# Patient Record
Sex: FEMALE | Race: WHITE | Employment: FULL TIME | URBAN - METROPOLITAN AREA
[De-identification: names, ages, dates, MRNs, and addresses within clinical notes are randomized per-mention and may not be internally consistent; named-entity substitution may affect disease eponyms.]

---

## 2017-10-19 ENCOUNTER — OFFICE VISIT (OUTPATIENT)
Dept: FAMILY MEDICINE CLINIC | Age: 27
End: 2017-10-19
Payer: COMMERCIAL

## 2017-10-19 VITALS
WEIGHT: 105.8 LBS | BODY MASS INDEX: 19.98 KG/M2 | HEIGHT: 61 IN | OXYGEN SATURATION: 98 % | TEMPERATURE: 97.6 F | HEART RATE: 96 BPM | SYSTOLIC BLOOD PRESSURE: 120 MMHG | DIASTOLIC BLOOD PRESSURE: 70 MMHG

## 2017-10-19 DIAGNOSIS — M79.7 FIBROMYALGIA: ICD-10-CM

## 2017-10-19 DIAGNOSIS — Z76.89 ENCOUNTER TO ESTABLISH CARE: ICD-10-CM

## 2017-10-19 DIAGNOSIS — R00.0 TACHYCARDIA: ICD-10-CM

## 2017-10-19 DIAGNOSIS — L70.0 ACNE VULGARIS: ICD-10-CM

## 2017-10-19 DIAGNOSIS — K58.0 IRRITABLE BOWEL SYNDROME WITH DIARRHEA: ICD-10-CM

## 2017-10-19 DIAGNOSIS — R42 EPISODIC LIGHTHEADEDNESS: ICD-10-CM

## 2017-10-19 DIAGNOSIS — G43.009 MIGRAINE WITHOUT AURA AND WITHOUT STATUS MIGRAINOSUS, NOT INTRACTABLE: Primary | ICD-10-CM

## 2017-10-19 DIAGNOSIS — Z00.00 HEALTHCARE MAINTENANCE: ICD-10-CM

## 2017-10-19 LAB
ALBUMIN SERPL-MCNC: 4.6 G/DL
ALP BLD-CCNC: 53 U/L
ALT SERPL-CCNC: 11 U/L
ANION GAP SERPL CALCULATED.3IONS-SCNC: 10 MMOL/L
AST SERPL-CCNC: 14 U/L
BILIRUB SERPL-MCNC: 0.5 MG/DL (ref 0.1–1.4)
BUN BLDV-MCNC: 13 MG/DL
CALCIUM SERPL-MCNC: 9.3 MG/DL
CHLORIDE BLD-SCNC: 102 MMOL/L
CO2: 26 MMOL/L
CREAT SERPL-MCNC: 0.6 MG/DL
GFR CALCULATED: >60
GLUCOSE BLD-MCNC: 83 MG/DL
POTASSIUM SERPL-SCNC: 4.1 MMOL/L
SODIUM BLD-SCNC: 138 MMOL/L
TOTAL PROTEIN: 7.4
TSH SERPL DL<=0.05 MIU/L-ACNC: 0.56 UIU/ML

## 2017-10-19 PROCEDURE — 99203 OFFICE O/P NEW LOW 30 MIN: CPT | Performed by: NURSE PRACTITIONER

## 2017-10-19 PROCEDURE — 93000 ELECTROCARDIOGRAM COMPLETE: CPT | Performed by: NURSE PRACTITIONER

## 2017-10-19 RX ORDER — SPIRONOLACTONE 50 MG/1
50 TABLET, FILM COATED ORAL DAILY
COMMUNITY
End: 2020-04-29 | Stop reason: SDUPTHER

## 2017-10-19 RX ORDER — AMITRIPTYLINE HYDROCHLORIDE 25 MG/1
25 TABLET, FILM COATED ORAL NIGHTLY
Qty: 30 TABLET | Refills: 2 | Status: SHIPPED | OUTPATIENT
Start: 2017-10-19 | End: 2018-04-05 | Stop reason: ALTCHOICE

## 2017-10-19 RX ORDER — GABAPENTIN 300 MG/1
300 CAPSULE ORAL 3 TIMES DAILY
COMMUNITY
End: 2019-01-30 | Stop reason: SDUPTHER

## 2017-10-19 ASSESSMENT — ENCOUNTER SYMPTOMS
WHEEZING: 0
EYE DISCHARGE: 0
RHINORRHEA: 0
ALLERGIC/IMMUNOLOGIC NEGATIVE: 1
STRIDOR: 0
SINUS PRESSURE: 0
SHORTNESS OF BREATH: 0
EYES NEGATIVE: 1
COUGH: 0
ABDOMINAL PAIN: 1
RESPIRATORY NEGATIVE: 1
ABDOMINAL DISTENTION: 0
EYE REDNESS: 0
NAUSEA: 1
DIARRHEA: 1

## 2017-10-19 NOTE — PROGRESS NOTES
Patient is present establishing care. Patient has not had a pcp in a long time. Patient see rheumatology for fibromyalgia. Patient is complaining of some tachycardia. Patient refused flu vaccine. Patients pain level is a 2/10. Medications and pharmacy reviewed with patient. Visit Information    Have you changed or started any medications since your last visit including any over-the-counter medicines, vitamins, or herbal medicines? no   Have you stopped taking any of your medications? Is so, why? -  no  Are you having any side effects from any of your medications? - no    Have you seen any other physician or provider since your last visit?  no   Have you had any other diagnostic tests since your last visit? yes - 6 months ago rheum. [facility]   Have you been seen in the emergency room and/or had an admission in a hospital since we last saw you?  no   Have you had your routine dental cleaning in the past 6 months?  yes -      Do you have an active MyChart account? If no, what is the barrier?   Yes    Patient Care Team:  Amanda Cam NP as PCP - General (Certified Nurse Practitioner)  Stephanie Roberts MD as Consulting Physician (Rheumatology)  Mihaela Shelley MD (Physical Medicine and Rehab)    Medical History Review  Past Medical, Family, and Social History reviewed and does contribute to the patient presenting condition    Health Maintenance   Topic Date Due    HIV screen  06/27/2004    DTaP/Tdap/Td vaccine (1 - Tdap) 06/27/2008    Cervical cancer screen  06/27/2010    Flu vaccine (1) 09/01/2017

## 2017-10-19 NOTE — PROGRESS NOTES
[address] [facility] [address]  Dept: [phone]  Dept Fax: [phone]      Kathy Lockhart is a 28 y.o. female who presents today for her medical conditions/complaints as noted below. Kathy Lockhart is c/o of [facility]      HPI:     HPI     Mayra Womelsdorf is here to est. She is a  at [employer]    She does see rheum, Dr. Cathy Rodriguez at [facility]. She is taking Neurontin for this problem. This is helping with her syx. She has seen physical medicine for a left forearm issue. They may be doing an EMG. She had seen a GI in [place] for IBS diarrhea. She did purchase probiotics and has not taken them routinely. She is using tumeric. She did try 2 courses of xifaxan. She did see a slight improvement but nothing that was lasting. She rates her stool a type 6 on the [facility] scale. She does have pain with the diarrhea. Migraines are an issue. She is having headaches most mornings when she wakes up. Coffee seems to make this better. She has a different more migraine like headache that usually comes later in the day. She has sensitivity to sound and noise. She does have visual changes. She feels these come at least 4 times weekly. She does have nausea, she has vomited on occasion. She has used excedrin, ibu. She will take an ibu as soon as she feels the HA coming. She will need to leave work early for these. She will put herself into a dark room. She is tachycardic often. She does become dizzy and short of breath with the tachycardia. She does have an aunt with Zamudio syndrome. She has not sought care prior to this. She does not have an EKG. She has not had any syncopal episodes. She does feel that she will lose balance and \"walk into walls\" at times.      No results found for: LABA1C          ( goal A1C is < 7)   No results found for: LABMICR  No results found for: LDLCHOLESTEROL, LDLCALC    (goal LDL is <100)   No results found for: AST, chest pain. Gastrointestinal: Positive for abdominal pain, diarrhea and nausea. Negative for abdominal distention. Endocrine: Negative. Negative for cold intolerance and heat intolerance. Genitourinary: Negative. Negative for difficulty urinating, frequency and urgency. Musculoskeletal: Positive for myalgias. Negative for arthralgias. Skin: Negative. Negative for rash. Allergic/Immunologic: Negative. Neurological: Positive for dizziness and headaches. Negative for weakness and numbness. Hematological: Negative. Psychiatric/Behavioral: Negative. Negative for sleep disturbance. The patient is not nervous/anxious. Objective:     /70 (Site: Left Arm, Position: Sitting, Cuff Size: Small Adult)   Pulse 96   Temp 97.6 °F (36.4 °C) (Oral)   Ht 5' 1\" (1.549 m)   Wt 105 lb 12.8 oz (48 kg)   SpO2 98%   BMI 19.99 kg/m²   Physical Exam   Constitutional: She is oriented to person, place, and time. Vital signs are normal. She appears well-developed and well-nourished. HENT:   Head: Normocephalic and atraumatic. Eyes: Conjunctivae are normal.   Neck: No thyromegaly present. Cardiovascular: Normal rate, regular rhythm and normal heart sounds. Exam reveals no gallop and no friction rub. No murmur heard. Pulmonary/Chest: Effort normal and breath sounds normal. No respiratory distress. She has no wheezes. She has no rales. Abdominal: Soft. Bowel sounds are normal. She exhibits no distension. There is no tenderness. Musculoskeletal: Normal range of motion. She exhibits no edema. Neurological: She is alert and oriented to person, place, and time. Coordination normal.   Skin: Skin is warm and dry. No rash noted. No erythema. Psychiatric: She has a normal mood and affect. Her behavior is normal. Judgment and thought content normal.   Vitals reviewed. EKG: normal EKG, normal sinus rhythm. Assessment:      1.  Migraine without aura and without status migrainosus, not intractable    2. Tachycardia    3. Acne vulgaris    4. Healthcare maintenance    5. Encounter to establish care    6. Episodic lightheadedness    7. Irritable bowel syndrome with diarrhea    8. Fibromyalgia                     Plan:     1. Migraine without aura and without status migrainosus, not intractable  Avoid triggers; red wine, aged cheese chocolate, lack of sleep  - amitriptyline (ELAVIL) 25 MG tablet; Take 1 tablet by mouth nightly  Dispense: 30 tablet; Refill: 2    2. Tachycardia    - TSH; Future  - T4, Free; Future  - EKG 12 lead unit performed  If no abnormality in labs will do 24 hour halter or refer to cardiology. She would prefer to do some testing before referring. 3. Acne vulgaris    - spironolactone (ALDACTONE) 50 MG tablet; Take 50 mg by mouth daily    4. Healthcare maintenance    - Basic Metabolic Panel; Future  - Comprehensive Metabolic Panel; Future  - HIV Screen; Future    5. Encounter to establish care      6. Episodic lightheadedness  EKG    7. Irritable bowel syndrome with diarrhea    - Eluxadoline (VIBERZI) 100 MG TABS; Take 100 mg by mouth 2 times daily  Dispense: 16 tablet; Refill: 0    8. Fibromyalgia    - gabapentin (NEURONTIN) 300 MG capsule; Take 300 mg by mouth 3 times daily      Orders Placed This Encounter   Procedures    Basic Metabolic Panel     Standing Status:   Future     Standing Expiration Date:   10/19/2018    Comprehensive Metabolic Panel     Standing Status:   Future     Standing Expiration Date:   10/19/2018    HIV Screen     Standing Status:   Future     Standing Expiration Date:   10/19/2018    TSH     Standing Status:   Future     Standing Expiration Date:   10/19/2018    T4, Free     Standing Status:   Future     Standing Expiration Date:   10/19/2018    EKG 12 lead unit performed     Order Specific Question:   Reason for Exam?     Answer: Tachycardia       No Follow-up on file. Patient given educational materials - see patient instructions.

## 2017-10-25 DIAGNOSIS — R00.0 TACHYCARDIA: ICD-10-CM

## 2017-10-25 DIAGNOSIS — Z00.00 HEALTHCARE MAINTENANCE: ICD-10-CM

## 2017-11-14 ENCOUNTER — OFFICE VISIT (OUTPATIENT)
Dept: FAMILY MEDICINE CLINIC | Age: 27
End: 2017-11-14
Payer: COMMERCIAL

## 2017-11-14 VITALS
HEART RATE: 122 BPM | BODY MASS INDEX: 20.78 KG/M2 | OXYGEN SATURATION: 96 % | WEIGHT: 110 LBS | DIASTOLIC BLOOD PRESSURE: 70 MMHG | TEMPERATURE: 97.7 F | SYSTOLIC BLOOD PRESSURE: 120 MMHG

## 2017-11-14 DIAGNOSIS — G43.009 MIGRAINE WITHOUT AURA AND WITHOUT STATUS MIGRAINOSUS, NOT INTRACTABLE: Primary | ICD-10-CM

## 2017-11-14 DIAGNOSIS — R42 EPISODIC LIGHTHEADEDNESS: ICD-10-CM

## 2017-11-14 DIAGNOSIS — R00.0 TACHYCARDIA: ICD-10-CM

## 2017-11-14 PROCEDURE — 99214 OFFICE O/P EST MOD 30 MIN: CPT | Performed by: NURSE PRACTITIONER

## 2017-11-14 RX ORDER — AMITRIPTYLINE HYDROCHLORIDE 10 MG/1
10 TABLET, FILM COATED ORAL NIGHTLY PRN
Qty: 30 TABLET | Refills: 3 | Status: SHIPPED | OUTPATIENT
Start: 2017-11-14 | End: 2018-04-05 | Stop reason: SDUPTHER

## 2017-11-14 ASSESSMENT — ENCOUNTER SYMPTOMS
RHINORRHEA: 0
STRIDOR: 0
SINUS PRESSURE: 0
ABDOMINAL PAIN: 1
EYE REDNESS: 0
SHORTNESS OF BREATH: 0
RESPIRATORY NEGATIVE: 1
WHEEZING: 0
DIARRHEA: 1
ALLERGIC/IMMUNOLOGIC NEGATIVE: 1
EYE DISCHARGE: 0
EYES NEGATIVE: 1
ABDOMINAL DISTENTION: 0
NAUSEA: 1
COUGH: 0

## 2017-11-14 NOTE — PROGRESS NOTES
Patient states viberzi did not really help. Medications and pharmacy reviewed with patient. Patient would like to do holtor moniter. Patient would like a new referral to rheumatology/ or if you can treat fibromyalgia for patient. Patient is not to fond of doctor. Visit Information    Have you changed or started any medications since your last visit including any over-the-counter medicines, vitamins, or herbal medicines? no   Have you stopped taking any of your medications? Is so, why? -  no  Are you having any side effects from any of your medications? - no    Have you seen any other physician or provider since your last visit? yes - physical medication   Have you had any other diagnostic tests since your last visit? yes - xray wrists and hand, emg   Have you been seen in the emergency room and/or had an admission in a hospital since we last saw you?  no   Have you had your routine dental cleaning in the past 6 months?  no     Do you have an active MyChart account? If no, what is the barrier?   Yes    Patient Care Team:  Marimar Padron NP as PCP - General (Certified Nurse Practitioner)  Veronika Vale MD as Consulting Physician (Rheumatology)  Vivi Jules MD (Physical Medicine and Rehab)  Veronika Vale MD as Consulting Physician (Internal Medicine)  Vivi Jules MD as Consulting Physician (Physical Medicine and Rehab)    Medical History Review  Past Medical, Family, and Social History reviewed and does contribute to the patient presenting condition    Health Maintenance   Topic Date Due    DTaP/Tdap/Td vaccine (1 - Tdap) 10/19/2018 (Originally 6/27/2008)    Cervical cancer screen  10/19/2018 (Originally 6/27/2010)    Flu vaccine (1) 10/19/2018 (Originally 9/1/2017)    HIV screen  Completed
Encounter   Procedures    External Referral To Cardiology     Referral Priority:   Routine     Referral Type:   Consult for Advice and Opinion     Referral Reason:   Specialty Services Required     Referred to Provider:   Feliciano Nayak MD     Requested Specialty:   Cardiology     Number of Visits Requested:   1       No Follow-up on file. Patient given educational materials - see patient instructions. Discussed use, benefit, and side effects of prescribed medications. All patient questions answered. Pt voiced understanding. Reviewed health maintenance. Instructed to continue current medications, diet and exercise. Patient agreed with treatment plan. Follow up as directed.      Electronically signed by Contreras Mays NP on 11/14/2017

## 2018-03-28 DIAGNOSIS — G43.009 MIGRAINE WITHOUT AURA AND WITHOUT STATUS MIGRAINOSUS, NOT INTRACTABLE: ICD-10-CM

## 2018-03-29 RX ORDER — AMITRIPTYLINE HYDROCHLORIDE 10 MG/1
10 TABLET, FILM COATED ORAL NIGHTLY PRN
Qty: 30 TABLET | Refills: 3 | OUTPATIENT
Start: 2018-03-29

## 2018-04-05 ENCOUNTER — OFFICE VISIT (OUTPATIENT)
Dept: FAMILY MEDICINE CLINIC | Age: 28
End: 2018-04-05
Payer: COMMERCIAL

## 2018-04-05 VITALS
BODY MASS INDEX: 21.16 KG/M2 | DIASTOLIC BLOOD PRESSURE: 70 MMHG | OXYGEN SATURATION: 100 % | WEIGHT: 112 LBS | TEMPERATURE: 98.3 F | SYSTOLIC BLOOD PRESSURE: 102 MMHG | HEART RATE: 78 BPM

## 2018-04-05 DIAGNOSIS — R00.0 TACHYCARDIA: Primary | ICD-10-CM

## 2018-04-05 DIAGNOSIS — G43.009 MIGRAINE WITHOUT AURA AND WITHOUT STATUS MIGRAINOSUS, NOT INTRACTABLE: ICD-10-CM

## 2018-04-05 PROCEDURE — 99214 OFFICE O/P EST MOD 30 MIN: CPT | Performed by: NURSE PRACTITIONER

## 2018-04-05 RX ORDER — AMITRIPTYLINE HYDROCHLORIDE 10 MG/1
20 TABLET, FILM COATED ORAL NIGHTLY
Qty: 60 TABLET | Refills: 1 | Status: SHIPPED | OUTPATIENT
Start: 2018-04-05 | End: 2018-06-30 | Stop reason: SDUPTHER

## 2018-04-05 RX ORDER — INDOMETHACIN 50 MG/1
50 CAPSULE ORAL 2 TIMES DAILY WITH MEALS
Qty: 60 CAPSULE | Refills: 3 | Status: SHIPPED | OUTPATIENT
Start: 2018-04-05 | End: 2018-07-16

## 2018-04-05 ASSESSMENT — PATIENT HEALTH QUESTIONNAIRE - PHQ9
SUM OF ALL RESPONSES TO PHQ QUESTIONS 1-9: 0
SUM OF ALL RESPONSES TO PHQ9 QUESTIONS 1 & 2: 0
2. FEELING DOWN, DEPRESSED OR HOPELESS: 0
1. LITTLE INTEREST OR PLEASURE IN DOING THINGS: 0

## 2018-04-06 ASSESSMENT — ENCOUNTER SYMPTOMS
ABDOMINAL DISTENTION: 0
ALLERGIC/IMMUNOLOGIC NEGATIVE: 1
EYE REDNESS: 0
COUGH: 0
EYE DISCHARGE: 0
WHEEZING: 0
RESPIRATORY NEGATIVE: 1
RHINORRHEA: 0
STRIDOR: 0
SHORTNESS OF BREATH: 0
EYES NEGATIVE: 1
DIARRHEA: 0
ABDOMINAL PAIN: 0
NAUSEA: 0
SINUS PRESSURE: 0

## 2018-07-16 ENCOUNTER — OFFICE VISIT (OUTPATIENT)
Dept: FAMILY MEDICINE CLINIC | Age: 28
End: 2018-07-16
Payer: COMMERCIAL

## 2018-07-16 VITALS
WEIGHT: 116 LBS | OXYGEN SATURATION: 99 % | TEMPERATURE: 98.2 F | DIASTOLIC BLOOD PRESSURE: 68 MMHG | BODY MASS INDEX: 21.92 KG/M2 | HEART RATE: 80 BPM | SYSTOLIC BLOOD PRESSURE: 120 MMHG

## 2018-07-16 DIAGNOSIS — F34.1 DYSTHYMIA: ICD-10-CM

## 2018-07-16 DIAGNOSIS — Z13.220 SCREENING FOR CHOLESTEROL LEVEL: ICD-10-CM

## 2018-07-16 DIAGNOSIS — G43.009 MIGRAINE WITHOUT AURA AND WITHOUT STATUS MIGRAINOSUS, NOT INTRACTABLE: Primary | ICD-10-CM

## 2018-07-16 DIAGNOSIS — F41.9 ANXIETY: ICD-10-CM

## 2018-07-16 PROCEDURE — 99214 OFFICE O/P EST MOD 30 MIN: CPT | Performed by: NURSE PRACTITIONER

## 2018-07-16 RX ORDER — BUTALBITAL, ACETAMINOPHEN AND CAFFEINE 50; 325; 40 MG/1; MG/1; MG/1
1 TABLET ORAL DAILY PRN
Qty: 60 TABLET | Refills: 1 | Status: SHIPPED | OUTPATIENT
Start: 2018-07-16 | End: 2019-05-01 | Stop reason: SDUPTHER

## 2018-07-16 RX ORDER — AMITRIPTYLINE HYDROCHLORIDE 25 MG/1
25 TABLET, FILM COATED ORAL NIGHTLY
Qty: 90 TABLET | Refills: 1 | Status: SHIPPED | OUTPATIENT
Start: 2018-07-16 | End: 2019-01-30 | Stop reason: SDUPTHER

## 2018-07-16 ASSESSMENT — PATIENT HEALTH QUESTIONNAIRE - PHQ9
1. LITTLE INTEREST OR PLEASURE IN DOING THINGS: 0
2. FEELING DOWN, DEPRESSED OR HOPELESS: 0
SUM OF ALL RESPONSES TO PHQ QUESTIONS 1-9: 0
SUM OF ALL RESPONSES TO PHQ9 QUESTIONS 1 & 2: 0

## 2018-07-16 ASSESSMENT — ENCOUNTER SYMPTOMS
RHINORRHEA: 0
ABDOMINAL PAIN: 0
ABDOMINAL DISTENTION: 0
NAUSEA: 0
ALLERGIC/IMMUNOLOGIC NEGATIVE: 1
RESPIRATORY NEGATIVE: 1
EYE REDNESS: 0
DIARRHEA: 0
COUGH: 0
EYE PAIN: 0
SINUS PRESSURE: 0

## 2018-09-14 LAB
CHOLESTEROL, TOTAL: 207 MG/DL
CHOLESTEROL/HDL RATIO: 2.9
HDLC SERPL-MCNC: 71 MG/DL (ref 35–70)
LDL CHOLESTEROL CALCULATED: 126 MG/DL (ref 0–160)
TRIGL SERPL-MCNC: 49 MG/DL
VLDLC SERPL CALC-MCNC: 10 MG/DL

## 2018-09-20 DIAGNOSIS — Z13.220 SCREENING FOR CHOLESTEROL LEVEL: ICD-10-CM

## 2019-01-30 ENCOUNTER — OFFICE VISIT (OUTPATIENT)
Dept: FAMILY MEDICINE CLINIC | Age: 29
End: 2019-01-30
Payer: COMMERCIAL

## 2019-01-30 VITALS
BODY MASS INDEX: 22.67 KG/M2 | DIASTOLIC BLOOD PRESSURE: 68 MMHG | TEMPERATURE: 98 F | SYSTOLIC BLOOD PRESSURE: 110 MMHG | HEART RATE: 97 BPM | OXYGEN SATURATION: 98 % | WEIGHT: 120 LBS

## 2019-01-30 DIAGNOSIS — G43.009 MIGRAINE WITHOUT AURA AND WITHOUT STATUS MIGRAINOSUS, NOT INTRACTABLE: ICD-10-CM

## 2019-01-30 DIAGNOSIS — M79.7 FIBROMYALGIA: Primary | ICD-10-CM

## 2019-01-30 PROCEDURE — 99214 OFFICE O/P EST MOD 30 MIN: CPT | Performed by: NURSE PRACTITIONER

## 2019-01-30 RX ORDER — GABAPENTIN 300 MG/1
300 CAPSULE ORAL 2 TIMES DAILY
Qty: 60 CAPSULE | Refills: 2 | Status: SHIPPED | OUTPATIENT
Start: 2019-01-30 | End: 2019-05-01 | Stop reason: SDUPTHER

## 2019-01-30 RX ORDER — AMITRIPTYLINE HYDROCHLORIDE 25 MG/1
25 TABLET, FILM COATED ORAL NIGHTLY
Qty: 90 TABLET | Refills: 1 | Status: SHIPPED | OUTPATIENT
Start: 2019-01-30 | End: 2019-08-01 | Stop reason: SDUPTHER

## 2019-01-30 ASSESSMENT — PATIENT HEALTH QUESTIONNAIRE - PHQ9
2. FEELING DOWN, DEPRESSED OR HOPELESS: 0
SUM OF ALL RESPONSES TO PHQ QUESTIONS 1-9: 0
SUM OF ALL RESPONSES TO PHQ QUESTIONS 1-9: 0
SUM OF ALL RESPONSES TO PHQ9 QUESTIONS 1 & 2: 0
1. LITTLE INTEREST OR PLEASURE IN DOING THINGS: 0

## 2019-01-30 ASSESSMENT — ENCOUNTER SYMPTOMS
ALLERGIC/IMMUNOLOGIC NEGATIVE: 1
COUGH: 0
DIARRHEA: 0
RESPIRATORY NEGATIVE: 1
NAUSEA: 0
ABDOMINAL PAIN: 0
ABDOMINAL DISTENTION: 0

## 2019-05-01 ENCOUNTER — OFFICE VISIT (OUTPATIENT)
Dept: FAMILY MEDICINE CLINIC | Age: 29
End: 2019-05-01
Payer: COMMERCIAL

## 2019-05-01 VITALS
HEART RATE: 98 BPM | WEIGHT: 123.4 LBS | OXYGEN SATURATION: 99 % | HEIGHT: 63 IN | BODY MASS INDEX: 21.86 KG/M2 | SYSTOLIC BLOOD PRESSURE: 120 MMHG | DIASTOLIC BLOOD PRESSURE: 60 MMHG

## 2019-05-01 DIAGNOSIS — G43.009 MIGRAINE WITHOUT AURA AND WITHOUT STATUS MIGRAINOSUS, NOT INTRACTABLE: Primary | ICD-10-CM

## 2019-05-01 DIAGNOSIS — M79.7 FIBROMYALGIA: ICD-10-CM

## 2019-05-01 PROCEDURE — 99213 OFFICE O/P EST LOW 20 MIN: CPT | Performed by: NURSE PRACTITIONER

## 2019-05-01 RX ORDER — BUTALBITAL, ACETAMINOPHEN AND CAFFEINE 50; 325; 40 MG/1; MG/1; MG/1
1 TABLET ORAL DAILY PRN
Qty: 60 TABLET | Refills: 1 | Status: SHIPPED | OUTPATIENT
Start: 2019-05-01 | End: 2020-01-30 | Stop reason: SDUPTHER

## 2019-05-01 RX ORDER — GABAPENTIN 300 MG/1
300 CAPSULE ORAL 3 TIMES DAILY
Qty: 90 CAPSULE | Refills: 2 | Status: SHIPPED | OUTPATIENT
Start: 2019-05-01 | End: 2019-08-01 | Stop reason: SDUPTHER

## 2019-05-01 ASSESSMENT — ENCOUNTER SYMPTOMS
NAUSEA: 0
DIARRHEA: 0
ABDOMINAL DISTENTION: 0
RESPIRATORY NEGATIVE: 1
ALLERGIC/IMMUNOLOGIC NEGATIVE: 1
ABDOMINAL PAIN: 0
COUGH: 0

## 2019-05-01 NOTE — PROGRESS NOTES
Pt here today for 3 month follow up on headaches and med refill  Pt states headaches are manageable only a couple a week    Discuss increasing gabapentin    Visit Information    Have you changed or started any medications since your last visit including any over-the-counter medicines, vitamins, or herbal medicines? no   Have you stopped taking any of your medications? Is so, why? -  no  Are you having any side effects from any of your medications? - no    Have you seen any other physician or provider since your last visit?  no   Have you had any other diagnostic tests since your last visit?  no   Have you been seen in the emergency room and/or had an admission in a hospital since we last saw you?  no   Have you had your routine dental cleaning in the past 6 months?  yes - december     Do you have an active MyChart account? If no, what is the barrier?   Yes    Patient Care Team:  RODRIGUE Dorman - CNP as PCP - General (Certified Nurse Practitioner)  Dahlia Becerril MD as Consulting Physician (Rheumatology)  Charisma Dutta MD as Consulting Physician (Physical Medicine and Rehab)  Dahlia Becerril MD as Consulting Physician (Internal Medicine)  Charisma Dutta MD as Consulting Physician (Physical Medicine and Rehab)  Mary Howe MD as Consulting Physician (Cardiology)    Medical History Review  Past Medical, Family, and Social History reviewed and does contribute to the patient presenting condition    Health Maintenance   Topic Date Due    Varicella Vaccine (1 of 2 - 13+ 2-dose series) 06/27/2002    DTaP/Tdap/Td vaccine (1 - Tdap) 01/30/2020 (Originally 6/27/2008)    Cervical cancer screen  01/30/2020 (Originally 6/27/2010)    Flu vaccine (Season Ended) 01/30/2020 (Originally 9/1/2019)    Potassium monitoring  01/30/2020 (Originally 10/19/2018)    Creatinine monitoring  01/30/2020 (Originally 10/19/2018)    HIV screen  Completed    Pneumococcal 0-64 years Vaccine  Aged Out

## 2019-05-01 NOTE — PROGRESS NOTES
[address] [facility] [address]  Dept: [phone]  Dept Fax: [phone]      Lucas Olszewski is a 33 y.o. female who presents today for hermedical conditions/complaints as noted below. Lucas Olszewski is c/o of 3 Month Follow-Up; Headache; and Medication Refill            HPI:      PRASANNA Cui is here today for refills. She is taking amitriptyline for her migraine headaches. This has made a vast difference for her as far as the headaches go. She has gained wt on the medication and is concerned that this may continue. At this time she will continue with the medication    Fibromyalgia-found that cutting back to BID was nto good for her. She was having an increase in shooting pain in the bilateral hands and arms. She states this medication is working well for her syx, she would like to go back to TID.        No results found for: LABA1C          ( goal A1Cis < 7)   No results found for: LABMICR  LDL Calculated (mg/dL)   Date Value   09/14/2018 126       (goal LDL is <100)   AST (U/L)   Date Value   10/19/2017 14     ALT (U/L)   Date Value   10/19/2017 11     BUN (mg/dL)   Date Value   10/19/2017 13     BP Readings from Last 3 Encounters:   05/01/19 120/60   01/30/19 110/68   07/16/18 120/68          (goal 120/80)    Past Medical History:   Diagnosis Date    Allergic rhinitis     Fibromyalgia     Headache       Past Surgical History:   Procedure Laterality Date    TOTAL HIP ARTHROPLASTY  2013       Family History   Problem Relation Age of Onset    Asthma Mother     Heart Disease Father     High Blood Pressure Father           Social History     Tobacco Use    Smoking status: Never Smoker    Smokeless tobacco: Never Used   Substance Use Topics    Alcohol use: Yes     Comment: social         Current Outpatient Medications   Medication Sig Dispense Refill    gabapentin (NEURONTIN) 300 MG capsule Take 1 capsule by mouth 3 times daily for 30 days. 90 capsule 2    butalbital-acetaminophen-caffeine (FIORICET, ESGIC) -40 MG per tablet Take 1 tablet by mouth daily as needed for Headaches or Migraine 60 tablet 1    amitriptyline (ELAVIL) 25 MG tablet Take 1 tablet by mouth nightly 90 tablet 1    metoprolol tartrate (LOPRESSOR) 25 MG tablet Take 12.5 mg by mouth 2 times daily      spironolactone (ALDACTONE) 50 MG tablet Take 50 mg by mouth daily       No current facility-administered medications for this visit. Allergies   Allergen Reactions    Indocin [Indomethacin] Nausea And Vomiting       Health Maintenance   Topic Date Due    Varicella Vaccine (1 of 2 - 13+ 2-dose series) 06/27/2002    DTaP/Tdap/Td vaccine (1 - Tdap) 01/30/2020 (Originally 6/27/2008)    Cervical cancer screen  01/30/2020 (Originally 6/27/2010)    Flu vaccine (Season Ended) 01/30/2020 (Originally 9/1/2019)    Potassium monitoring  01/30/2020 (Originally 10/19/2018)    Creatinine monitoring  01/30/2020 (Originally 10/19/2018)    HIV screen  Completed    Pneumococcal 0-64 years Vaccine  Aged Out          Review of Systems   Constitutional: Positive for appetite change. Negative for activity change. HENT: Negative. Respiratory: Negative. Negative for cough. Cardiovascular: Positive for palpitations (improved). Negative for chest pain. Gastrointestinal: Negative for abdominal distention, abdominal pain, diarrhea and nausea. Musculoskeletal: Positive for myalgias (much improved). Negative for arthralgias. Skin: Negative. Negative for rash. Allergic/Immunologic: Negative. Neurological: Positive for headaches. Negative for dizziness, weakness and numbness. Psychiatric/Behavioral: Negative. Negative for sleep disturbance. The patient is not nervous/anxious.         Objective:     /60 (Site: Left Upper Arm, Position: Sitting, Cuff Size: Medium Adult)   Pulse 98   Ht 5' 3\" (1.6 m)   Wt 123 lb 6.4 oz (56 kg)   SpO2 99%   BMI 21.86 kg/m² Physical Exam   Constitutional: She is oriented to person, place, and time. She appears well-developed and well-nourished. HENT:   Head: Normocephalic. Eyes: Conjunctivae are normal.   Cardiovascular: Normal rate and regular rhythm. Pulmonary/Chest: Effort normal and breath sounds normal.   Neurological: She is alert and oriented to person, place, and time. No cranial nerve deficit. Skin: Skin is warm and dry. Psychiatric: She has a normal mood and affect. Her behavior is normal. Judgment and thought content normal.         Assessment:      1. Migraine without aura and without status migrainosus, not intractable    2. Fibromyalgia                     Plan:      No orders of the defined types were placed in this encounter. 1. Fibromyalgia    - gabapentin (NEURONTIN) 300 MG capsule; Take 1 capsule by mouth 3 times daily for 30 days. Dispense: 90 capsule; Refill: 2    2. Migraine without aura and without status migrainosus, not intractable    - butalbital-acetaminophen-caffeine (FIORICET, ESGIC) -40 MG per tablet; Take 1 tablet by mouth daily as needed for Headaches or Migraine  Dispense: 60 tablet; Refill: 1      No follow-ups on file. Patient given educational materials - see patientinstructions. Discussed use, benefit, and side effects of prescribed medications. All patient questions answered. Pt voiced understanding. Reviewed health maintenance. Instructed to continue current medications, diet and exercise. Patient agreedwith treatment plan. Follow up as directed.      Electronically signed by RODRIGUE Vences CNP on 5/1/2019

## 2019-08-01 ENCOUNTER — OFFICE VISIT (OUTPATIENT)
Dept: FAMILY MEDICINE CLINIC | Age: 29
End: 2019-08-01
Payer: COMMERCIAL

## 2019-08-01 VITALS
SYSTOLIC BLOOD PRESSURE: 102 MMHG | HEART RATE: 110 BPM | WEIGHT: 124 LBS | BODY MASS INDEX: 21.97 KG/M2 | DIASTOLIC BLOOD PRESSURE: 60 MMHG | OXYGEN SATURATION: 98 %

## 2019-08-01 DIAGNOSIS — G43.009 MIGRAINE WITHOUT AURA AND WITHOUT STATUS MIGRAINOSUS, NOT INTRACTABLE: ICD-10-CM

## 2019-08-01 DIAGNOSIS — M79.7 FIBROMYALGIA: ICD-10-CM

## 2019-08-01 PROCEDURE — 99214 OFFICE O/P EST MOD 30 MIN: CPT | Performed by: NURSE PRACTITIONER

## 2019-08-01 RX ORDER — TOPIRAMATE 25 MG/1
25 TABLET ORAL NIGHTLY
Qty: 90 TABLET | Refills: 1 | Status: SHIPPED | OUTPATIENT
Start: 2019-08-01 | End: 2020-01-30 | Stop reason: SDUPTHER

## 2019-08-01 RX ORDER — AMITRIPTYLINE HYDROCHLORIDE 25 MG/1
25 TABLET, FILM COATED ORAL NIGHTLY
Qty: 90 TABLET | Refills: 1 | Status: SHIPPED | OUTPATIENT
Start: 2019-08-01 | End: 2020-04-29 | Stop reason: ALTCHOICE

## 2019-08-01 RX ORDER — GABAPENTIN 300 MG/1
300 CAPSULE ORAL 3 TIMES DAILY
Qty: 90 CAPSULE | Refills: 5 | Status: SHIPPED | OUTPATIENT
Start: 2019-08-01 | End: 2020-01-30 | Stop reason: SDUPTHER

## 2019-08-01 ASSESSMENT — ENCOUNTER SYMPTOMS
COUGH: 0
RESPIRATORY NEGATIVE: 1
ALLERGIC/IMMUNOLOGIC NEGATIVE: 1

## 2019-08-01 NOTE — PROGRESS NOTES
[facility]  [facility] [address] [place] [address]  Dept: [phone]  Dept Fax: [phone]      Willis Myers is a 26 y.o. female who presents today for hermedical conditions/complaints as noted below. Willis Myers is c/o of 3 Month Follow-Up and Medication Refill            HPI:      PRASANNA  Maciej Richards is here for medication refills. She is using amitriptyline for migraine and it has been helping her. She has gained 20 lbs and is feeling that she may want to try another option. She has changed her diet and is more active than she had been. None of the weight is coming off. Gabapentin is working well for her fibromyalgia. she is taking 2-3 daily. If she misses the middle dose she does have more pain. She does intake for Zepf and does not like carrying the medication with her to work.    No results found for: LABA1C          ( goal A1Cis < 7)   No results found for: LABMICR  LDL Calculated (mg/dL)   Date Value   09/14/2018 126       (goal LDL is <100)   AST (U/L)   Date Value   10/19/2017 14     ALT (U/L)   Date Value   10/19/2017 11     BUN (mg/dL)   Date Value   10/19/2017 13     BP Readings from Last 3 Encounters:   08/01/19 102/60   05/01/19 120/60   01/30/19 110/68          (goal 120/80)    Past Medical History:   Diagnosis Date    Allergic rhinitis     Fibromyalgia     Headache       Past Surgical History:   Procedure Laterality Date    TOTAL HIP ARTHROPLASTY  2013       Family History   Problem Relation Age of Onset    Asthma Mother     Heart Disease Father     High Blood Pressure Father           Social History     Tobacco Use    Smoking status: Never Smoker    Smokeless tobacco: Never Used   Substance Use Topics    Alcohol use: Yes     Comment: social         Current Outpatient Medications   Medication Sig Dispense Refill    amitriptyline (ELAVIL) 25 MG tablet Take 1 tablet by mouth nightly 90 tablet 1    topiramate (TOPAMAX) 25 MG tablet Take

## 2019-08-01 NOTE — PROGRESS NOTES
Pt comes in for 3 month med refill. Also wants to discuss weight gain. Visit Information    Have you changed or started any medications since your last visit including any over-the-counter medicines, vitamins, or herbal medicines? no   Have you stopped taking any of your medications? Is so, why? -  no  Are you having any side effects from any of your medications? - no    Have you seen any other physician or provider since your last visit?  no   Have you had any other diagnostic tests since your last visit?  no   Have you been seen in the emergency room and/or had an admission in a hospital since we last saw you?  no   Have you had your routine dental cleaning in the past 6 months?  yes -        Do you have an active MyChart account? If no, what is the barrier?   Yes    Patient Care Team:  RODRIGUE Patton CNP as PCP - General (Certified Nurse Practitioner)  RODRIGUE Patton CNP as PCP - [facility]  Rolando Wood MD as Consulting Physician (Rheumatology)  Zurdo Miranda MD as Consulting Physician (Physical Medicine and Rehab)  Rolando Wood MD as Consulting Physician (Internal Medicine)  Zurdo Miranda MD as Consulting Physician (Physical Medicine and Rehab)  Ashu Matthews MD as Consulting Physician (Cardiology)    Medical History Review  Past Medical, Family, and Social History reviewed and does contribute to the patient presenting condition    Health Maintenance   Topic Date Due    Varicella Vaccine (1 of 2 - 13+ 2-dose series) 06/27/2002    DTaP/Tdap/Td vaccine (1 - Tdap) 01/30/2020 (Originally 6/27/2008)    Cervical cancer screen  01/30/2020 (Originally 6/27/2010)    Flu vaccine (1) 01/30/2020 (Originally 9/1/2019)    Potassium monitoring  01/30/2020 (Originally 10/19/2018)    Creatinine monitoring  01/30/2020 (Originally 10/19/2018)    HIV screen  Completed    Pneumococcal 0-64 years Vaccine  Aged Out

## 2019-10-31 ENCOUNTER — TELEPHONE (OUTPATIENT)
Dept: FAMILY MEDICINE CLINIC | Age: 29
End: 2019-10-31

## 2020-01-29 PROBLEM — G43.909 MIGRAINE HEADACHE: Status: ACTIVE | Noted: 2020-01-29

## 2020-01-29 PROBLEM — R00.2 PALPITATIONS: Status: ACTIVE | Noted: 2017-12-22

## 2020-01-29 PROBLEM — R55 VASOVAGAL SYNCOPE: Status: ACTIVE | Noted: 2017-12-22

## 2020-01-30 ENCOUNTER — OFFICE VISIT (OUTPATIENT)
Dept: FAMILY MEDICINE CLINIC | Age: 30
End: 2020-01-30
Payer: COMMERCIAL

## 2020-01-30 ENCOUNTER — TELEPHONE (OUTPATIENT)
Dept: FAMILY MEDICINE CLINIC | Age: 30
End: 2020-01-30

## 2020-01-30 VITALS
OXYGEN SATURATION: 99 % | BODY MASS INDEX: 20.37 KG/M2 | DIASTOLIC BLOOD PRESSURE: 74 MMHG | SYSTOLIC BLOOD PRESSURE: 130 MMHG | WEIGHT: 115 LBS | HEART RATE: 87 BPM

## 2020-01-30 PROCEDURE — 99213 OFFICE O/P EST LOW 20 MIN: CPT | Performed by: NURSE PRACTITIONER

## 2020-01-30 RX ORDER — GABAPENTIN 300 MG/1
300 CAPSULE ORAL 3 TIMES DAILY
Qty: 90 CAPSULE | Refills: 5 | Status: SHIPPED | OUTPATIENT
Start: 2020-01-30 | End: 2020-04-30 | Stop reason: SDUPTHER

## 2020-01-30 RX ORDER — BUTALBITAL, ACETAMINOPHEN AND CAFFEINE 50; 325; 40 MG/1; MG/1; MG/1
1 TABLET ORAL DAILY PRN
Qty: 60 TABLET | Refills: 1 | Status: SHIPPED | OUTPATIENT
Start: 2020-01-30 | End: 2021-02-22 | Stop reason: ALTCHOICE

## 2020-01-30 RX ORDER — TOPIRAMATE 25 MG/1
25 TABLET ORAL NIGHTLY
Qty: 90 TABLET | Refills: 1 | Status: SHIPPED | OUTPATIENT
Start: 2020-01-30 | End: 2020-04-30 | Stop reason: SDUPTHER

## 2020-01-30 ASSESSMENT — ENCOUNTER SYMPTOMS
ALLERGIC/IMMUNOLOGIC NEGATIVE: 1
RESPIRATORY NEGATIVE: 1
COUGH: 0

## 2020-01-30 ASSESSMENT — PATIENT HEALTH QUESTIONNAIRE - PHQ9
SUM OF ALL RESPONSES TO PHQ QUESTIONS 1-9: 0
2. FEELING DOWN, DEPRESSED OR HOPELESS: 0
SUM OF ALL RESPONSES TO PHQ QUESTIONS 1-9: 0
SUM OF ALL RESPONSES TO PHQ9 QUESTIONS 1 & 2: 0
1. LITTLE INTEREST OR PLEASURE IN DOING THINGS: 0

## 2020-01-30 NOTE — PROGRESS NOTES
[facility]  [facility] [address]issy [address]  [address] [facility] [address]  Dept: [phone]  Dept Fax: [phone]      Francoise Best is a 26 y.o. female who presents today for hermedical conditions/complaints as noted below. Francoise Best is c/o of 6 Month Follow-Up and Medication Check            HPI:      HPI  Robbin Mtz is here for follow up on migraines. She is using topamax. She finds the medication is very effective for her migraine at 50mg. She will get intense nausea intermittently at that dose. She has lost 10 lbs. Due to the nausea. She has been taking 25 mg, which eliminates the nausea but does not give her headache benefit. Fibromyalgia-using gabapentin. This is working well for her. No negative side effects. No results found for: LABA1C          ( goal A1Cis < 7)   No results found for: LABMICR  LDL Calculated (mg/dL)   Date Value   09/14/2018 126       (goal LDL is <100)   AST (U/L)   Date Value   10/19/2017 14     ALT (U/L)   Date Value   10/19/2017 11     BUN (mg/dL)   Date Value   10/19/2017 13     BP Readings from Last 3 Encounters:   01/30/20 130/74   08/01/19 102/60   05/01/19 120/60          (goal 120/80)    Past Medical History:   Diagnosis Date    Allergic rhinitis     Fibromyalgia     Headache       Past Surgical History:   Procedure Laterality Date    TOTAL HIP ARTHROPLASTY  2013       Family History   Problem Relation Age of Onset    Asthma Mother     Heart Disease Father     High Blood Pressure Father           Social History     Tobacco Use    Smoking status: Never Smoker    Smokeless tobacco: Never Used   Substance Use Topics    Alcohol use: Yes     Comment: social         Current Outpatient Medications   Medication Sig Dispense Refill    gabapentin (NEURONTIN) 300 MG capsule Take 1 capsule by mouth 3 times daily for 180 days.  90 capsule 5    butalbital-acetaminophen-caffeine (FIORICET, ESGIC) -40 MG per tablet Take 1 tablet by mouth daily as needed for Headaches or Migraine 60 tablet 1    topiramate (TOPAMAX) 25 MG tablet Take 1 tablet by mouth nightly 90 tablet 1    Erenumab-aooe (AIMOVIG) 70 MG/ML SOAJ Inject 70 mg into the skin once a week 4 pen 5    spironolactone (ALDACTONE) 50 MG tablet Take 50 mg by mouth daily      amitriptyline (ELAVIL) 25 MG tablet Take 1 tablet by mouth nightly (Patient not taking: Reported on 1/30/2020) 90 tablet 1    metoprolol tartrate (LOPRESSOR) 25 MG tablet Take 12.5 mg by mouth 2 times daily       No current facility-administered medications for this visit. Allergies   Allergen Reactions    Indocin [Indomethacin] Nausea And Vomiting       Health Maintenance   Topic Date Due    DTaP/Tdap/Td vaccine (1 - Tdap) 01/30/2020 (Originally 6/27/2000)    Cervical cancer screen  01/30/2020 (Originally 6/27/2010)    Potassium monitoring  01/30/2020 (Originally 10/19/2018)    Creatinine monitoring  01/30/2020 (Originally 10/19/2018)    Flu vaccine (1) 01/30/2021 (Originally 9/1/2019)    HIV screen  Completed    Pneumococcal 0-64 years Vaccine  Aged Out    Varicella Vaccine  Discontinued          Review of Systems   Constitutional: Positive for appetite change and unexpected weight change. Negative for activity change. HENT: Negative. Respiratory: Negative. Negative for cough. Cardiovascular: Negative. Negative for chest pain and palpitations. Musculoskeletal: Positive for myalgias (much improved). Negative for arthralgias. Skin: Negative. Negative for rash. Allergic/Immunologic: Negative. Neurological: Positive for headaches. Negative for dizziness, weakness and numbness. Psychiatric/Behavioral: Negative. Negative for sleep disturbance. The patient is not nervous/anxious.         Objective:     /74 (Site: Right Upper Arm, Position: Sitting, Cuff Size: Medium Adult)   Pulse 87   Wt 115 lb (52.2 kg)   SpO2 99%   BMI 20.37 kg/m²   Physical Exam  Constitutional: Appearance: She is well-developed. HENT:      Head: Normocephalic. Eyes:      Conjunctiva/sclera: Conjunctivae normal.   Cardiovascular:      Rate and Rhythm: Normal rate and regular rhythm. Pulmonary:      Effort: Pulmonary effort is normal.      Breath sounds: Normal breath sounds. Skin:     General: Skin is warm and dry. Neurological:      Mental Status: She is alert and oriented to person, place, and time. Cranial Nerves: No cranial nerve deficit. Psychiatric:         Behavior: Behavior normal.         Thought Content: Thought content normal.         Judgment: Judgment normal.           Assessment:      1. Fibromyalgia    2. Migraine without aura and without status migrainosus, not intractable                     Plan:      No orders of the defined types were placed in this encounter. 1. Fibromyalgia  Well controlled   Continue   - gabapentin (NEURONTIN) 300 MG capsule; Take 1 capsule by mouth 3 times daily for 180 days. Dispense: 90 capsule; Refill: 5    2. Migraine without aura and without status migrainosus, not intractable  She has failed on amitriptyline and is having significant SE on topamax. Discussion on injectable medication options. She is willing to try. She has nurses in the family that she will be comfortable with getting assistance in learning the injection process. - butalbital-acetaminophen-caffeine (FIORICET, ESGIC) -40 MG per tablet; Take 1 tablet by mouth daily as needed for Headaches or Migraine  Dispense: 60 tablet; Refill: 1  - topiramate (TOPAMAX) 25 MG tablet; Take 1 tablet by mouth nightly  Dispense: 90 tablet; Refill: 1  - Erenumab-aooe (AIMOVIG) 70 MG/ML SOAJ; Inject 70 mg into the skin once a week  Dispense: 4 pen; Refill: 5        No follow-ups on file. Patient given educational materials - see patientinstructions. Discussed use, benefit, and side effects of prescribed medications. All patient questions answered. Pt voiced understanding.

## 2020-01-30 NOTE — LETTER
medications may occur, leading to death. Hyperalgesia may develop, in which patients receiving opioids for the treatment of pain may actually become more sensitive to certain painful stimuli, and in some cases, experience pain from ordinarily non-painful stimuli. Women between the ages of 14-53 who could become pregnant should carefully weigh the risks and benefits of opioids with their physicians, as these medications increase the risk of pregnancy complications, including miscarriage,  delivery and stillbirth. It is also possible for babies to be born addicted to opioids. Opioid dependence withdrawal symptoms may include; feelings of uneasiness, increased pain, irritability, belly pain, diarrhea, sweats and goose-flesh. Benzodiazepines and non-benzodiazepine sleep medications: These medications can lead to problems such as addiction/dependence, sedation, fatigue, lightheadedness, dizziness, incoordination, falls, depression, hallucinations, and impaired judgment, memory and concentration. The ability to drive and operate machinery may also be affected. Abnormal sleep-related behaviors have been reported, including sleep walking, driving, making telephone calls, eating, or having sex while not fully awake. These medications can suppress breathing and worsen sleep apnea, particularly when combined with alcohol or other sedating medications, potentially leading to death. Dependence withdrawal symptoms may include tremors, anxiety, hallucinations and seizures. Stimulants:  Common adverse effects include addiction/dependence, increased blood pressure and heart rate, decreased appetite, nausea, involuntary weight loss, insomnia, irritability, and headaches. These risks may increase when these medications are combined with other stimulants, such as caffeine pills or energy drinks, certain weight loss supplements and oral decongestants.   Dependence withdrawal symptoms may include depressed mood, loss of interest, suicidal thoughts, anxiety, fatigue, appetite changes and agitation. Testosterone replacement therapy:  Potential side effects include increased risk of stroke and heart attack, blood clots, increased blood pressure, increased cholesterol, enlarged prostate, sleep apnea, irritability/aggression and other mood disorders, and decreased fertility. Other:     1. I understand that I have the following responsibilities:  · I will take medications at the dose and frequency prescribed. · I will not increase or change how I take my medications without the approval of the health care provider who signs this Medication Agreement. · I will arrange for refills at the prescribed interval ONLY during regular office hours. I will not ask for refills earlier than agreed, after-hours, on holidays or on weekends. · I will obtain all refills for these medications at  ·  ____________________________________  pharmacy (phone number  ·  ________________________), with full consent for my provider and pharmacist to exchange information in writing or verbally. · I will not request any pain medications or controlled substances from other providers and will inform this provider of all other medications I am taking. · I will inform my other health care providers that I am taking these medications and of the existence of this [place] [id number]. In the event of an emergency, I will provide the same information to the emergency department providers. · I will protect my prescriptions and medications. I understand that lost or misplaced prescriptions will not be replaced. · I will keep medications only for my own use and will not share them with others. I will keep all medications away from children. · I agree to participate in any medical, psychological or psychiatric assessments recommended by my provider.   · I will actively participate in any program designed to improve function, including social, physical, psychological and daily or work activities. 2. I will not use illegal or street drugs or another person's prescription. If I have an addiction problem with drugs or alcohol and my provider asks me to enter a program to address this issue, I agree to follow through. Such programs may include:  · 12-Step program and securing a sponsor  · Individual counseling   · Inpatient or outpatient treatment  · Other:_____________________________________________________________________________________________________________________________________________    If in treatment, I will request that a copy of the programs initial evaluation and treatment recommendations be sent to this provider and will not expect refills until that is received. I will also request written monthly updates be sent to this provider to verify my continuing treatment. 3. I will consent to drug screening upon my providers request to assure I am only taking the prescribed drugs, described in this MEDICATION AGREEMENT. I understand that a drug screen is a laboratory test in which a sample of my urine, blood or saliva is checked to see what drugs I have been taking. 4. I agree that I will treat the providers and staff at this office with respect at all times. I will keep all of my scheduled appointments, but if I need to cancel my appointment, I will do so a minimum of 24 hours before it is scheduled. 5. I understand that this provider may stop prescribing the medications listed if:  · I do not show any improvement in pain, or my activity has not improved. · I develop rapid tolerance or loss of improvement, as described in my treatment plan. · I develop significant side effects from the medication. · My behavior is inconsistent with the responsibilities outlined above, which may also result in my being prevented from receiving further care from this office.   ·

## 2020-02-10 ENCOUNTER — TELEPHONE (OUTPATIENT)
Dept: FAMILY MEDICINE CLINIC | Age: 30
End: 2020-02-10

## 2020-04-30 ENCOUNTER — TELEMEDICINE (OUTPATIENT)
Dept: FAMILY MEDICINE CLINIC | Age: 30
End: 2020-04-30
Payer: COMMERCIAL

## 2020-04-30 VITALS
BODY MASS INDEX: 19.67 KG/M2 | SYSTOLIC BLOOD PRESSURE: 96 MMHG | WEIGHT: 111 LBS | HEIGHT: 63 IN | HEART RATE: 115 BPM | DIASTOLIC BLOOD PRESSURE: 72 MMHG

## 2020-04-30 PROCEDURE — 99214 OFFICE O/P EST MOD 30 MIN: CPT | Performed by: NURSE PRACTITIONER

## 2020-04-30 RX ORDER — TOPIRAMATE 25 MG/1
25 TABLET ORAL NIGHTLY
Qty: 30 TABLET | Refills: 5 | Status: SHIPPED | OUTPATIENT
Start: 2020-04-30 | End: 2021-02-22 | Stop reason: ALTCHOICE

## 2020-04-30 RX ORDER — SPIRONOLACTONE 50 MG/1
50 TABLET, FILM COATED ORAL DAILY
Qty: 30 TABLET | Refills: 5 | Status: SHIPPED | OUTPATIENT
Start: 2020-04-30 | End: 2020-12-09 | Stop reason: SDUPTHER

## 2020-04-30 RX ORDER — ONDANSETRON 4 MG/1
4 TABLET, FILM COATED ORAL DAILY PRN
Qty: 30 TABLET | Refills: 5 | Status: ON HOLD | OUTPATIENT
Start: 2020-04-30 | End: 2021-04-22 | Stop reason: HOSPADM

## 2020-04-30 RX ORDER — GABAPENTIN 300 MG/1
300 CAPSULE ORAL 3 TIMES DAILY
Qty: 90 CAPSULE | Refills: 5 | Status: SHIPPED | OUTPATIENT
Start: 2020-04-30 | End: 2021-02-10

## 2020-04-30 ASSESSMENT — ENCOUNTER SYMPTOMS
COUGH: 0
ALLERGIC/IMMUNOLOGIC NEGATIVE: 1
RESPIRATORY NEGATIVE: 1
NAUSEA: 1

## 2020-04-30 NOTE — PROGRESS NOTES
Pt is doing a virtual visit for a 3 month F/U for medication refills. She has not heard from the [place] that she was referred to.  We will re fax request.

## 2020-04-30 NOTE — PROGRESS NOTES
2020    TELEHEALTH EVALUATION -- Audio/Visual (During PLROR-70 public health emergency)    HPI:    Noe Bourgeois (:  1990) has requested an audio/video evaluation for the following concern(s):    VV with Tamiko Stahl for medication refills. Migraines-using topamax which is beneficial. Does cause her nausea. She will stop the medication for a week or so until the nausea resolves. Does get more headache when off, but not migraine type. Was referred to Dr. Chan Melton but pt did not hear from that office. Would like to try an injectable. NCS-was using metoprolol in the past prescribed by cardiology. She had stopped taking this some time ago. She has noted that her heart rate will go into the 115-150 range at times with position changes or movement. She has not had any syncopal episodes. She denies lightheadedness. She does feel some shortness of breath when her heart rate is elevated. Desires to go back on the metoprolol. Fibromyalgia-using gabapentin with good results. Pain is tolerable. Not causing sedation. Review of Systems   Constitutional: Negative for activity change, appetite change and unexpected weight change. HENT: Negative. Respiratory: Negative. Negative for cough. Cardiovascular: Negative. Negative for chest pain and palpitations. Gastrointestinal: Positive for nausea. Musculoskeletal: Positive for myalgias (much improved). Negative for arthralgias. Skin: Negative. Negative for rash. Allergic/Immunologic: Negative. Neurological: Positive for headaches. Negative for dizziness, weakness and numbness. Psychiatric/Behavioral: Negative. Negative for sleep disturbance. The patient is not nervous/anxious. Prior to Visit Medications    Medication Sig Taking?  Authorizing Provider   topiramate (TOPAMAX) 25 MG tablet Take 1 tablet by mouth nightly Yes Sujatha Ferris, APRN - CNP   gabapentin (NEURONTIN) 300 MG capsule Take 1 capsule by mouth 3 times daily for No Facial Asymmetry (Cranial nerve 7 motor function) (limited exam to video visit)          [] No gaze palsy        [] Abnormal-         Skin:        [x] No significant exanthematous lesions or discoloration noted on facial skin         [] Abnormal-            Psychiatric:       [x] Normal Affect [] No Hallucinations        [] Abnormal-       ASSESSMENT/PLAN:  1. Migraine without aura and without status migrainosus, not intractable  Has information to contact [facility] office. Will continue with topamax for now. - topiramate (TOPAMAX) 25 MG tablet; Take 1 tablet by mouth nightly  Dispense: 30 tablet; Refill: 5    2. Fibromyalgia    - gabapentin (NEURONTIN) 300 MG capsule; Take 1 capsule by mouth 3 times daily for 180 days. Dispense: 90 capsule; Refill: 5    3. Acne vulgaris    - spironolactone (ALDACTONE) 50 MG tablet; Take 1 tablet by mouth daily  Dispense: 30 tablet; Refill: 5    4. Tachycardia  Heart rate 115 this am, resume metoprolol  - metoprolol tartrate (LOPRESSOR) 25 MG tablet; Take 0.5 tablets by mouth 2 times daily  Dispense: 15 tablet; Refill: 5      No follow-ups on file. Douglas Rodas is a 26 y.o. female being evaluated by a Virtual Visit (video visit) encounter to address concerns as mentioned above. A caregiver was present when appropriate. Due to this being a TeleHealth encounter (During [facility] [place] public health emergency), evaluation of the following organ systems was limited: Vitals/Constitutional/EENT/Resp/CV/GI//MS/Neuro/Skin/Heme-Lymph-Imm. Pursuant to the emergency declaration under the [facility], [address] [place] [address] authority and the [employer] Act, this Virtual Visit was conducted with patient's (and/or legal guardian's) consent, to reduce the patient's risk of exposure to COVID-19 and provide necessary medical care.   The patient (and/or legal guardian) has also been advised to contact this office for worsening conditions or problems, and seek emergency medical treatment and/or call 911 if deemed necessary. Patient identification was verified at the start of the visit: Yes        Services were provided through a video synchronous discussion virtually to substitute for in-person clinic visit. Patient and provider were located at their individual homes. --RODRIGUE Mclaughlin CNP on 4/30/2020 at 8:21 AM    An electronic signature was used to authenticate this note.

## 2020-07-27 ENCOUNTER — OFFICE VISIT (OUTPATIENT)
Dept: NEUROLOGY | Age: 30
End: 2020-07-27
Payer: COMMERCIAL

## 2020-07-27 VITALS
BODY MASS INDEX: 18.61 KG/M2 | HEIGHT: 63 IN | TEMPERATURE: 99.1 F | DIASTOLIC BLOOD PRESSURE: 80 MMHG | SYSTOLIC BLOOD PRESSURE: 128 MMHG | WEIGHT: 105 LBS | HEART RATE: 128 BPM

## 2020-07-27 PROCEDURE — 99204 OFFICE O/P NEW MOD 45 MIN: CPT | Performed by: PSYCHIATRY & NEUROLOGY

## 2020-07-27 PROCEDURE — 64615 CHEMODENERV MUSC MIGRAINE: CPT | Performed by: PSYCHIATRY & NEUROLOGY

## 2020-07-27 NOTE — PROGRESS NOTES
[facility], [address], [address] [place] [address]  Ph: [phone] or [phone]  FAX: [phone]    Chief Complaint: migraines     Dear RODRIGUE Smith CNP     I had the pleasure of seeing your patient today in neurology consultation for her symptoms. As you would recall Emi Paula is a 31 y.o. female. The history has been obtained from the patient and from the accompanying medical records. The patient was at her baseline until 14years of age when she was diagnosed with migraines. She has 6-7 migraines a week. Her headaches are located mostly on the left side of her head and involve the left eye area. They can also involve the occipital area. They are discribed as sharp stabbing pain and are associated with photophobia, phonophobia and nausea and vomiting. She reports occational tingling and dropping things; these can happen with or without a migraine. She does not have headache free days. Poor sleep hygein or processed foods usally cause her migrains. The patient has been on metoprolol       Previous prophylactic medications: Metoprolol, amitriptyline-stopped due to brain fog.   Allergic to indomethacin      REVIEW OF SYSTEMS   Constitutional Weight: present, Appetite: present (no appetite), Fatigue: present   HEENT Visual disturbance: present, Ears: pain and ringing   Respiratory Shortness of breath: absent, Cough: absent   Cardiovascular Chest pain: absent, Leg swelling :absent   GI Constipation: absent, Diarrhea: present, Swallowing change: absent    Urinary frequency: absent, Urinary urgency: absent,    Musculoskeletal Neck pain: present, Back pain: present, Stiffness: present, Muscle pain: present, Joint pain: present   Dermatological Hair loss: absent, Skin changes: absent   Neurological Memory loss: present, Confusion: absent, Seizures: absent, Trouble walking or imbalance: present, Dizziness: present, Weakness: present, Numbness: present Tremor: absent Spasm: absent, Speech difficulty: absent, Headache: present, Light sensitivity: present   Psychiatric Anxiety: absent, Hallucination: absent, Depression, absent   Hematologic Abnormal bleeding/Bruising: absent, Anemia: absent       Neurological work up:  CT head  CTA head and neck  MRI brain   2 D echo     Past Medical History:   Diagnosis Date    Allergic rhinitis     Fibromyalgia     Headache      Past Surgical History:   Procedure Laterality Date    TOTAL HIP ARTHROPLASTY  2013     Allergies   Allergen Reactions    Indocin [Indomethacin] Nausea And Vomiting     Family History   Problem Relation Age of Onset    Asthma Mother     Heart Disease Father     High Blood Pressure Father       Social History     Socioeconomic History    Marital status: Single     Spouse name: Not on file    Number of children: Not on file    Years of education: Not on file    Highest education level: Not on file   Occupational History    Not on file   Social Needs    Financial resource strain: Not on file    Food insecurity     Worry: Not on file     Inability: Not on file    Transportation needs     Medical: Not on file     Non-medical: Not on file   Tobacco Use    Smoking status: Never Smoker    Smokeless tobacco: Never Used   Substance and Sexual Activity    Alcohol use: Yes     Comment: social    Drug use: No    Sexual activity: Never   Lifestyle    Physical activity     Days per week: Not on file     Minutes per session: Not on file    Stress: Not on file   Relationships    Social connections     Talks on phone: Not on file     Gets together: Not on file     Attends Buddhist service: Not on file     Active member of club or organization: Not on file     Attends meetings of clubs or organizations: Not on file     Relationship status: Not on file    Intimate partner violence     Fear of current or ex partner: Not on file     Emotionally abused: Not on file     Physically abused: Not on file     Forced Gait                  Normal station and gait       Lab Results   Component Value Date    LDLCALC 126 09/14/2018     No components found for: CHLPL  Lab Results   Component Value Date    TRIG 49 09/14/2018     Lab Results   Component Value Date    HDL 71 (A) 09/14/2018     Lab Results   Component Value Date    LDLCALC 126 09/14/2018     No results found for: LABVLDL  No results found for: LABA1C  No results found for: EAG  No results found for: [place]     All of patient's labs were personally reviewed. All the imaging studies were personally reviewed and discussed with the patient. Assessment Recommendations:  Chronic migraine WO Aura, intractable, W Status Migrainosus    The patient has a longstanding history of intractable migraines. We will initiate Botox treatments today for headache prophylaxis. Procedure was discussed patient's questions were answered. Should Botox fail to provide relief, we may initiate injectable prophylaxis such as Aimovig. Calcium channel blockers are contraindicated the patient being on multiple blood pressure medication. I would obtain MRI of the brain with contrast to rule out structural abnormality. She will Return for CC: Migraine. or sooner if the patient's symptoms worsen or if there are any side effects. [address] [place] [address] I would like to thank you for the consult. Please do not hesitate if you have any questions about the patient care. Scribe Attestation:   By signing my name below, I, Nicolas Williamson, attest that this documentation has been prepared under the direction and in the presence of Tang Mi MD.     Electronically Signed: Nicolas Williamson. 7/29/20. 2:32 PM EDT. Physician Attestation:   Kaitlin Galeas MD, personally performed the services described in this documentation. All medical record entries made by the scribe were at my direction and in my presence.  I have reviewed the chart and discharge instructions (if applicable) and agree that the record reflects my personal performance and is accurate and complete.     Electronically Signed: Pennie Melton 7/29/2020 11:14 PM    Diplomate, American Board of Psychiatry and Neurology  Diplomate, American Board of Clinical Neurophysiology  Diplomate, American Board of Epilepsy

## 2020-07-27 NOTE — PROGRESS NOTES
REVIEW OF SYSTEMS    Constitutional Weight: present, Appetite: present (no appetite), Fatigue: present   HEENT Visual disturbance: present, Ears: pain and ringing   Respiratory Shortness of breath: absent, Cough: absent   Cardiovascular Chest pain: absent, Leg swelling :absent   GI Constipation: absent, Diarrhea: present, Swallowing change: absent    Urinary frequency: absent, Urinary urgency: absent,    Musculoskeletal Neck pain: present, Back pain: present, Stiffness: present, Muscle pain: present, Joint pain: present   Dermatological Hair loss: absent, Skin changes: absent   Neurological Memory loss: present, Confusion: absent, Seizures: absent, Trouble walking or imbalance: present, Dizziness: present, Weakness: present, Numbness: present Tremor: absent Spasm: absent, Speech difficulty: absent, Headache: present, Light sensitivity: present   Psychiatric Anxiety: absent, Hallucination: absent, Depression, absent   Hematologic Abnormal bleeding/Bruising: absent, Anemia: absent

## 2020-07-30 NOTE — PROGRESS NOTES
[facility]  [facility], [address], [address] [place] [address]  Ph: [phone] or [phone]  FAX: [phone]    Deshaun Sanches M.D.  NUHA Chen M.D. Rhesa Borrow, M.D. Indication for treatment: Chronic migraine without aura, intractable, with status migrainosus (G 43.161)  Consent form was signed. Please see the associated scanned paper. Potential risks and benefits for the procedure were explained to the patient. Procedure: 30-gauge half inch needle was used and 200 U vial Botox was prepared with 4ml 0.9% NS.155 units of Botox were used and 45 units of Botox were discarded.    Botox was injected at 31 different sites as follows:   Order  Muscle  Units injected    A  Corrugator1  10 units at 2 div sites    B  Procerus  5 Units in 1 site    C  Frontalis¹  20 Units div. 4 sites    D  Temporalis¹  40 Units div 8 site    E  Occipitalis¹  30 Units div. 6 sites    F  Cervical paraspinal muscles¹  20 Units div 4 sites    G  Trapezius¹  30 Units div 6 sites    Total Dose  155 Units div 31 sites    2 Dose distributed bilaterally  Blood loss: Less than 1 cc  Complications: none

## 2020-10-05 ENCOUNTER — TELEPHONE (OUTPATIENT)
Dept: NEUROLOGY | Age: 30
End: 2020-10-05

## 2020-10-05 ENCOUNTER — OFFICE VISIT (OUTPATIENT)
Dept: NEUROLOGY | Age: 30
End: 2020-10-05
Payer: COMMERCIAL

## 2020-10-05 VITALS
HEIGHT: 63 IN | BODY MASS INDEX: 19.42 KG/M2 | WEIGHT: 109.6 LBS | DIASTOLIC BLOOD PRESSURE: 76 MMHG | TEMPERATURE: 98.4 F | HEART RATE: 100 BPM | SYSTOLIC BLOOD PRESSURE: 123 MMHG

## 2020-10-05 PROCEDURE — 99214 OFFICE O/P EST MOD 30 MIN: CPT | Performed by: PSYCHIATRY & NEUROLOGY

## 2020-10-05 RX ORDER — UBROGEPANT 50 MG/1
50 TABLET ORAL DAILY PRN
Qty: 30 TABLET | Refills: 0 | Status: SHIPPED | OUTPATIENT
Start: 2020-10-05 | End: 2020-11-04

## 2020-10-05 RX ORDER — CETIRIZINE HYDROCHLORIDE 10 MG/1
10 TABLET ORAL DAILY
COMMUNITY
End: 2021-06-30

## 2020-10-05 NOTE — PROGRESS NOTES
[address]  [address], [address] [place] [address]  Ph: [phone] or [phone]  FAX: [phone]    Chief Complaint: migraines     Dear RODRIGUE Tinoco - CNP     I had the pleasure of seeing your patient today in neurology consultation for her symptoms. As you would recall Cristopher Diaz is a 31 y.o. female. The history has been obtained from the patient and from the accompanying medical records. The patient was at her baseline until 14years of age when she was diagnosed with migraines. She has 6-7 migraines a week. Her headaches are located mostly on the left side of her head and involve the left eye area. They can also involve the occipital area. They are discribed as sharp stabbing pain and are associated with photophobia, phonophobia and nausea and vomiting. She reports occational tingling and dropping things; these can happen with or without a migraine. She does not have headache free days. Poor sleep, hygiene or processed foods usually cause her migraines. The patient has been on metoprolol. Today, the patient reports the Botox injection she received in July has helped, but not tremendously. She reports her migraines are less severe than they were before. The patient estimates 30% relief with Botox with no side effects. She reports medication compliance. She also states topamax causes nausea. Previous prophylactic medications: Metoprolol, amitriptyline-stopped due to brain fog.   Allergic to indomethacin      REVIEW OF SYSTEMS   Constitutional Weight: present, Appetite: present (no appetite), Fatigue: present   HEENT Visual disturbance: present, Ears: pain and ringing   Respiratory Shortness of breath: absent, Cough: absent   Cardiovascular Chest pain: absent, Leg swelling :absent   GI Constipation: absent, Diarrhea: present, Swallowing change: absent    Urinary frequency: absent, Urinary urgency: absent,    Musculoskeletal Neck pain: present, Back pain: present, Stiffness: present, Muscle pain: present, Joint pain: present   Dermatological Hair loss: absent, Skin changes: absent   Neurological Memory loss: present, Confusion: absent, Seizures: absent, Trouble walking or imbalance: present, Dizziness: present, Weakness: present, Numbness: present Tremor: absent Spasm: absent, Speech difficulty: absent, Headache: present, Light sensitivity: present   Psychiatric Anxiety: absent, Hallucination: absent, Depression, absent   Hematologic Abnormal bleeding/Bruising: absent, Anemia: absent       Neurological work up:  CT head  CTA head and neck  MRI brain   2 D echo     Past Medical History:   Diagnosis Date    Allergic rhinitis     Fibromyalgia     Headache      Past Surgical History:   Procedure Laterality Date    TOTAL HIP ARTHROPLASTY  2013     Allergies   Allergen Reactions    Indocin [Indomethacin] Nausea And Vomiting     Family History   Problem Relation Age of Onset    Asthma Mother     Heart Disease Father     High Blood Pressure Father       Social History     Socioeconomic History    Marital status: Single     Spouse name: Not on file    Number of children: Not on file    Years of education: Not on file    Highest education level: Not on file   Occupational History    Not on file   Social Needs    Financial resource strain: Not on file    Food insecurity     Worry: Not on file     Inability: Not on file    Transportation needs     Medical: Not on file     Non-medical: Not on file   Tobacco Use    Smoking status: Never Smoker    Smokeless tobacco: Never Used   Substance and Sexual Activity    Alcohol use: Yes     Comment: social    Drug use: No    Sexual activity: Never   Lifestyle    Physical activity     Days per week: Not on file     Minutes per session: Not on file    Stress: Not on file   Relationships    Social connections     Talks on phone: Not on file     Gets together: Not on file     Attends Worship service: Not on file Active member of club or organization: Not on file     Attends meetings of clubs or organizations: Not on file     Relationship status: Not on file    Intimate partner violence     Fear of current or ex partner: Not on file     Emotionally abused: Not on file     Physically abused: Not on file     Forced sexual activity: Not on file   Other Topics Concern    Not on file   Social History Narrative    Not on file      There were no vitals taken for this visit. Physical examination:  General appearance: Normal. Well groomed.  In no acute distress    Head: Normocephalic, atraumatic  Eyes: Extraocular movements intact, eye lids normal  Lungs: Respirations unlabored, chest wall no deformity  ENT: Normal external ear canals, no sinus tenderness  Heart: Regular rate rhythm  Abdomen: No masses, tenderness  Extremities: No cyanosis or edema, 2+ pulses  Musculoskeletal: Normal range of motion in all joints  Skin: Intact, normal skin color    Neurological examination:    Mental status   Alert and oriented; intact memory with no confusion, speech or language problems; no hallucinations or delusions     Cranial nerves   II - visual fields intact to confrontation                                                III, IV, VI - extra-ocular muscles full: no pupillary defect; no TASHA, no nystagmus, no ptosis   V - normal facial sensation                                                               VII - normal facial symmetry                                                             VIII - intact hearing                                                                             IX, X - symmetrical palate                                                                  XI - symmetrical shoulder shrug                                                       XII - midline tongue without atrophy or fasciculation     Motor function  Normal muscle bulk and tone; normal power 5/5, including fine motor movements     Sensory function

## 2020-10-09 ENCOUNTER — TELEPHONE (OUTPATIENT)
Dept: NEUROLOGY | Age: 30
End: 2020-10-09

## 2020-10-12 ENCOUNTER — PATIENT MESSAGE (OUTPATIENT)
Dept: FAMILY MEDICINE CLINIC | Age: 30
End: 2020-10-12

## 2020-10-12 NOTE — TELEPHONE ENCOUNTER
From: Sky Delgado  To: RODRIGUE Estrada - CNP  Sent: 10/12/2020 7:49 AM EDT  Subject: Non-Urgent Medical Question    Hello,    I recently got tested for COVID-19 due to having a persistent low grade fever, headache, fatigue and congestion. I was tested on 10/8 and just received negative results this morning. I continue to have a low-grade fever. Cherl Spinner generally around 100.2 with the highest at 100.9. I'm also still experiencing fatigue, headaches, congestion and some GI issues. No issues with breathing or a cough. I was wondering if there is anything else I should be doing as a follow-up for these symptoms?      Thanks,  Dominic

## 2020-10-22 ENCOUNTER — TELEPHONE (OUTPATIENT)
Dept: NEUROLOGY | Age: 30
End: 2020-10-22

## 2020-10-22 ENCOUNTER — PROCEDURE VISIT (OUTPATIENT)
Dept: NEUROLOGY | Age: 30
End: 2020-10-22
Payer: COMMERCIAL

## 2020-10-22 PROCEDURE — 64615 CHEMODENERV MUSC MIGRAINE: CPT | Performed by: PSYCHIATRY & NEUROLOGY

## 2020-10-23 NOTE — TELEPHONE ENCOUNTER
Marcos Pang called and left a message that she got notice that the Ava Gravely was denied. She wanted to know what the next step is. I called her back. My call went to voice mail. A message was left for a return call.

## 2020-10-23 NOTE — TELEPHONE ENCOUNTER
Sveta Guo called back. I let her know that the denial says she has to have either tried or have documentation that triptans are  contraindicated. She told me that you had discussed this, but she has tachycardia. Please advise.

## 2020-10-26 ENCOUNTER — TELEPHONE (OUTPATIENT)
Dept: NEUROLOGY | Age: 30
End: 2020-10-26

## 2020-11-04 NOTE — TELEPHONE ENCOUNTER
Received a denial again stating Nurtec is a preferred before [facility] and [facility]. Do you want to send this if for the pt to try?

## 2020-11-09 NOTE — PROGRESS NOTES
[address] [place] [address]  Phone: [phone], Fax: [phone]    TELEHEALTH EVALUATION -- Audio/Visual (During [facility]-83 public health emergency)    Patient ID verified by me prior to start of this visit    Bri Bryan (:  1990) has requested an audio/video evaluation for the following concern(s):  Chief Complaint   Patient presents with    Fever     X 14 DAYS     Fatigue    Headache    GI Problem    Otalgia    Generalized Body Aches      HPI:  Bri Bryan is an established patient of Eris Santiago. Patient scheduled this appointment today due to a concern with COVID-19 symptoms. Patient reported having a recurrent fever for the past month. Patient also had several symptoms that could be related to COVID-19 but had tested negative a month ago and recently from [place] which was a rapid test.    Since then, she has had another exposure to her mother and her aunt who tested positive for COVID-19. Patient reports her symptoms as fever of 100 201. Fever is persistent. Patient is taking some antipyretic with some fair relief. However, she continues to have some fatigue, headache, ear aches, general body aches, and nasal congestion. Symptoms have been waxing and waning. She does not have any contact with anybody with similar symptoms. However she is very concerned about the fever that continues to recur in the past month. Patient is able to tolerate food and fluid. She does not have any more diarrhea. But she has some mild chest discomfort once in a while. She does not have any shortness of breath she does not have any anosmia or loss of taste. Lives with 58year old father. Review of Systems   Constitutional: Positive for fatigue and fever. Negative for chills. HENT: Positive for congestion and ear pain. Negative for postnasal drip, sinus pressure and sore throat. Eyes: Negative for pain.    Respiratory: Positive 11/9/2020) 30 tablet 5    gabapentin (NEURONTIN) 300 MG capsule Take 1 capsule by mouth 3 times daily for 180 days. (Patient taking differently: Take 300 mg by mouth. Patient takes two tablets and sometimes three.) 90 capsule 5     No current facility-administered medications for this visit. Allergies   Allergen Reactions    Indocin [Indomethacin] Nausea And Vomiting        Social History     Tobacco Use    Smoking status: Never Smoker    Smokeless tobacco: Never Used   Substance Use Topics    Alcohol use: Yes     Comment: social    Drug use: No        PHYSICAL EXAMINATION:  Vital Signs: (As obtained by patient/caregiver or practitioner observation)    Constitutional: [x] Appears well-developed and well-nourished [x] No apparent distress      [] Abnormal-   Mental status  [x] Alert and awake  [x] Oriented to person/place/time [x]Able to follow commands      Eyes:  EOM    [x]  Normal  [] Abnormal-  Sclera  [x]  Normal  [] Abnormal -         Discharge [x]  None visible  [] Abnormal -    HENT:   [x] Normocephalic, atraumatic.   [] Abnormal   [x] Mouth/Throat: Mucous membranes are moist.     External Ears [x] Normal  [] Abnormal-     Neck: [x] No visualized mass     Pulmonary/Chest: [x] Respiratory effort normal.  [x] No visualized signs of difficulty breathing or respiratory distress        [] Abnormal     Musculoskeletal:   [x] Normal gait with no signs of ataxia         [x] Normal range of motion of neck        [] Abnormal-     Neurological:        [x] No Facial Asymmetry (Cranial nerve 7 motor function) (limited exam to video visit)          [x] No gaze palsy        [] Abnormal-     Skin:        [x] No significant exanthematous lesions or discoloration noted on facial skin         [] Abnormal-     Psychiatric:       [x] Normal Affect [x] No Hallucinations        [x] Abnormal- Anxious, with pressured speech    Other pertinent observable physical exam findings- Appears very concern  No results found for: WBC, HGB, HCT, MCV, PLT  Lab Results   Component Value Date     10/19/2017    K 4.1 10/19/2017     10/19/2017    CO2 26 10/19/2017    BUN 13 10/19/2017    CREATININE 0.60 10/19/2017    GLUCOSE 83 10/19/2017    CALCIUM 9.3 10/19/2017        Due to this being a TeleHealth encounter, evaluation of the following organ systems is limited: Vitals/Constitutional/EENT/Resp/CV/GI//MS/Neuro/Skin/Heme-Lymph-Imm. ASSESSMENT/PLAN:  1. Contact with or exposure to viral disease  Ongoing  Testing site: [phone]  Continue COVID 19 symptoms monitoring  Continue to safe distance from family and friends. DISCUSSED and ADVISED TO:  Stay away from people with suspected COVID 19. Wear a mask. Stay away from big crowds. Wash hands frequently. Use alcohol based hand cleansers frequently. Try not to touch face. Try to improve your immune system by eating healthy food, getting enough sleep and trying to avoid excessive stress. Stay in touch with the current news. Report for fever/chills,body aches, shortness of breath, malaise, loss of taste/smell, worsening cough. - Comprehensive Metabolic Panel; Future  - CBC Auto Differential; Future  - Urinalysis Reflex to Culture; Future  - Covid-19 Ambulatory; Future    2. Suspected 2019 novel coronavirus infection  Test and treat  Covid vs Influenza    - Covid-19 Ambulatory; Future    3. FUO (fever of unknown origin)  Ongoing  Evaluate and treat  Tylenol for fever.  - Comprehensive Metabolic Panel; Future  - CBC Auto Differential; Future  - Urinalysis Reflex to Culture; Future  - XR CHEST (2 VW); Future    Controlled Substance Monitoring:  Acute and Chronic Pain Monitoring:   RX Monitoring 1/30/2020   Periodic Controlled Substance Monitoring No signs of potential drug abuse or diversion identified. ;Assessed functional status.      Orders Placed This Encounter   Procedures    XR CHEST (2 VW)     Standing Status:   Future     Standing Expiration Date:   11/10/2021 benefit, and side effects of prescribed medications. Barriers to medication compliance addressed. Patient given educational materials - see patient instructions. All patient questions answered. Patient voiced understanding. Return in about 3 months (around 2/10/2021) for Appt salbador ZAZUETA, Chronic conditions. Barbara Machado is a 31 y.o. female patient  being evaluated by a Virtual Visit (video visit) encounter to address concerns as mentioned above. Due to this being a TeleHealth encounter (During [facility] public health emergency), evaluation of the following organ systems was limited:Vitals/Constitutional/EENT/Resp/CV/GI//MS/Neuro/Skin/Heme-Lymph-Imm. Services were provided through a video synchronous discussion virtually to substitute for in-person clinic visit. This is a telehealth visit that was performed with the originating site at Patient Location: home and provider Location of [place], [place]. Verbal consent to participate in video visit was obtained. Patient ID verified by me prior to start of this visit  I discussed with the patient the nature of our telehealth visits via interactive/real-time audio/video that:  - I would evaluate the patient and recommend diagnostics and treatments based on my assessment  - Our sessions are not being recorded and that personal health information is protected  - Our team would provide follow up care in person if/when the patient needs it. Pursuant to the emergency declaration under the [facility], [address] [place] [address] authority and the [employer] Act, this Virtual Visit was conducted with patient's (and/or legal guardian's) consent, to reduce the patient's risk of exposure to COVID-19 and provide necessary medical care.   The patient (and/or legal guardian) has also been advised to contact this office for worsening conditions or problems, and seek emergency medical treatment

## 2020-11-10 ENCOUNTER — TELEMEDICINE (OUTPATIENT)
Dept: FAMILY MEDICINE CLINIC | Age: 30
End: 2020-11-10
Payer: COMMERCIAL

## 2020-11-10 PROCEDURE — 99213 OFFICE O/P EST LOW 20 MIN: CPT | Performed by: FAMILY MEDICINE

## 2020-11-10 PROCEDURE — 81003 URINALYSIS AUTO W/O SCOPE: CPT | Performed by: FAMILY MEDICINE

## 2020-11-10 RX ORDER — RIMEGEPANT SULFATE 75 MG/75MG
TABLET, ORALLY DISINTEGRATING ORAL
Qty: 8 TABLET | Refills: 0 | Status: SHIPPED | OUTPATIENT
Start: 2020-11-10 | End: 2021-03-24 | Stop reason: SDUPTHER

## 2020-11-10 ASSESSMENT — ENCOUNTER SYMPTOMS
SORE THROAT: 0
WHEEZING: 0
DIARRHEA: 1
SINUS PRESSURE: 0
VOMITING: 0
CONSTIPATION: 0
COUGH: 0
CHEST TIGHTNESS: 1
EYE PAIN: 0
NAUSEA: 0
ABDOMINAL PAIN: 0

## 2020-11-10 NOTE — PATIENT INSTRUCTIONS
   Dear David Gonzales Patient,     Thank you for entrusting us with your care. Here is how you can expect to hear about your test results:   If your COVID-19 test is positive, you will receive a phone call from a member of our team, and your results will be available in [address], our secure patient portal.  If your COVID-19 test is negative, your results will be available on [employer], our secure patient portal.   If your employer is requiring you to show proof of your negative test result, you'll be able to download and print off the test result record. If you already have a [employer], visit [email or web address] and click Log in to [employer] to view any test results. If you don't have a [employer], you can register online by visiting eyetok and clicking Sign up for [employer]. Thank you for choosing [facility]) for your care. [facility])     [email or web address] [facility]. [employer]/[id number]/what-your-test-results-mean. [email or web address]  Learning About Coronavirus (COVID-19)  Coronavirus (COVID-19): Overview  What is coronavirus (COVID-19)? The coronavirus disease (COVID-19) is caused by a virus. It is an illness that was first found in [place], [place], in December 2019. It has since spread worldwide. The virus can cause fever, cough, and trouble breathing. In severe cases, it can cause pneumonia and make it hard to breathe without help. It can cause death. Coronaviruses are a large group of viruses. They cause the common cold. They also cause more serious illnesses like Middle East respiratory syndrome (MERS) and severe acute respiratory syndrome (SARS). COVID-19 is caused by a novel coronavirus. That means it's a new type that has not been seen in people before. This virus spreads person-to-person through droplets from coughing and sneezing. It can also spread when you are close to someone who is infected.  And it can spread when you touch something that has the virus on it, such as a doorknob or a tabletop. What can you do to protect yourself from coronavirus (COVID-19)? The best way to protect yourself from getting sick is to:  · Avoid areas where there is an outbreak. · Avoid contact with people who may be infected. · Wash your hands often with soap or alcohol-based hand sanitizers. · Avoid crowds and try to stay at least 6 feet away from other people. · Wash your hands often, especially after you cough or sneeze. Use soap and water, and scrub for at least 20 seconds. If soap and water aren't available, use an alcohol-based hand . · Avoid touching your mouth, nose, and eyes. What can you do to avoid spreading the virus to others? To help avoid spreading the virus to others:  · Cover your mouth with a tissue when you cough or sneeze. Then throw the tissue in the trash. · Use a disinfectant to clean things that you touch often. · Stay home if you are sick or have been exposed to the virus. Don't go to school, work, or public areas. And don't use public transportation. · If you are sick:  ? Leave your home only if you need to get medical care. But call the doctor's office first so they know you're coming. And wear a face mask, if you have one.  ? If you have a face mask, wear it whenever you're around other people. It can help stop the spread of the virus when you cough or sneeze. ? Clean and disinfect your home every day. Use household  and disinfectant wipes or sprays. Take special care to clean things that you grab with your hands. These include doorknobs, remote controls, phones, and handles on your refrigerator and microwave. And don't forget countertops, tabletops, bathrooms, and computer keyboards. When to call for help  Call 911 anytime you think you may need emergency care. For example, call if:  · You have severe trouble breathing. (You can't talk at all.)  · You have constant chest pain or pressure.   · You are severely dizzy or lightheaded. · You are confused or can't think clearly. · Your face and lips have a blue color. · You pass out (lose consciousness) or are very hard to wake up. Call your doctor now if you develop symptoms such as:  · Shortness of breath. · Fever. · Cough. If you need to get care, call ahead to the doctor's office for instructions before you go. Make sure you wear a face mask, if you have one, to prevent exposing other people to the virus. Where can you get the latest information? The following health organizations are tracking and studying this virus. Their websites contain the most up-to-date information. Miki Luis also learn what to do if you think you may have been exposed to the virus. · U.S. Centers for Disease Control and Prevention (CDC): The CDC provides updated news about the disease and travel advice. The website also tells you how to prevent the spread of infection. www.cdc.gov  · World Health Organization [facility]): WHO offers information about the virus outbreaks. WHO also has travel advice. www.who.int  Current as of: April 1, 2020               Content Version: 12.4  © 5383-7239 Health[employer]. Care instructions adapted under license by your healthcare professional. If you have questions about a medical condition or this instruction, always ask your healthcare professional. [address] any warranty or liability for your use of this information. VIDEO ON COVID 19  [email or web address]://[email or web address] [facility]/watch/[email or web address]?utm_source=Newsletter&utm_medium=Email&utm_campaign=COVID_News_040820&utm_content=first&mkt_tok=[id number]%3D    [email or web address].[email or web address]. html    Preventing the Spread of Coronavirus Disease 2019 in Homes and Residential Communities   For the most recent information go to [employer].fi    Prevention steps for People with confirmed or suspected COVID-19 (including persons under investigation) who do not need to be hospitalized  and   People with confirmed COVID-19 who were hospitalized and determined to be medically stable to go home    Your healthcare provider and public health staff will evaluate whether you can be cared for at home. If it is determined that you do not need to be hospitalized and can be isolated at home, you will be monitored by staff from your local or state health department. You should follow the prevention steps below until a healthcare provider or local or state health department says you can return to your normal activities. Stay home except to get medical care  People who are mildly ill with COVID-19 are able to isolate at home during their illness. You should restrict activities outside your home, except for getting medical care. Do not go to work, school, or public areas. Avoid using public transportation, ride-sharing, or taxis. Separate yourself from other people and animals in your home  People: As much as possible, you should stay in a specific room and away from other people in your home. Also, you should use a separate bathroom, if available. Animals: You should restrict contact with pets and other animals while you are sick with COVID-19, just like you would around other people. Although there have not been reports of pets or other animals becoming sick with COVID-19, it is still recommended that people sick with COVID-19 limit contact with animals until more information is known about the virus. When possible, have another member of your household care for your animals while you are sick.  If you are sick with COVID-19, avoid contact with your pet, including petting, snuggling, being kissed or licked, and sharing food. If you must care for your pet or be around animals while you are sick, wash your hands before and after you interact with pets and wear a facemask. Call ahead before visiting your doctor  If you have a medical appointment, call the healthcare provider and tell them that you have or may have COVID-19. This will help the healthcare providers office take steps to keep other people from getting infected or exposed. Wear a facemask  You should wear a facemask when you are around other people (e.g., sharing a room or vehicle) or pets and before you enter a healthcare providers office. If you are not able to wear a facemask (for example, because it causes trouble breathing), then people who live with you should not stay in the same room with you, or they should wear a facemask if they enter your room. Cover your coughs and sneezes  Cover your mouth and nose with a tissue when you cough or sneeze. Throw used tissues in a lined trash can. Immediately wash your hands with soap and water for at least 20 seconds or, if soap and water are not available, clean your hands with an alcohol-based hand  that contains at least 60% alcohol. Clean your hands often  Wash your hands often with soap and water for at least 20 seconds, especially after blowing your nose, coughing, or sneezing; going to the bathroom; and before eating or preparing food. If soap and water are not readily available, use an alcohol-based hand  with at least 60% alcohol, covering all surfaces of your hands and rubbing them together until they feel dry. Soap and water are the best option if hands are visibly dirty. Avoid touching your eyes, nose, and mouth with unwashed hands. Avoid sharing personal household items  You should not share dishes, drinking glasses, cups, eating utensils, towels, or bedding with other people or pets in your home.  After using these items, they should be washed thoroughly with soap and water. Clean all high-touch surfaces everyday  High touch surfaces include counters, tabletops, doorknobs, bathroom fixtures, toilets, phones, keyboards, tablets, and bedside tables. Also, clean any surfaces that may have blood, stool, or body fluids on them. Use a household cleaning spray or wipe, according to the label instructions. Labels contain instructions for safe and effective use of the cleaning product including precautions you should take when applying the product, such as wearing gloves and making sure you have good ventilation during use of the product. Monitor your symptoms  Seek prompt medical attention if your illness is worsening (e.g., difficulty breathing). Before seeking care, call your healthcare provider and tell them that you have, or are being evaluated for, COVID-19. Put on a facemask before you enter the facility. These steps will help the healthcare providers office to keep other people in the office or waiting room from getting infected or exposed. Ask your healthcare provider to call the local or state health department. Persons who are placed under active monitoring or facilitated self-monitoring should follow instructions provided by their local health department or occupational health professionals, as appropriate. When working with your local health department check their available hours. If you have a medical emergency and need to call 911, notify the dispatch personnel that you have, or are being evaluated for COVID-19. If possible, put on a facemask before emergency medical services arrive. Discontinuing home isolation  Patients with confirmed COVID-19 should remain under home isolation precautions until the risk of secondary transmission to others is thought to be low. The decision to discontinue home isolation precautions should be made on a case-by-case basis, in consultation with healthcare providers and state and local health departments.     Steps to help prevent the spread of COVID-19 if you are sick  SOURCE - https://arias-trejo.info/. html     Stay home except to get medical care   ; Stay home: People who are mildly ill with COVID-19 are able to isolate at home during their illness. You should restrict activities outside your home, except for getting medical care.   ; Avoid public areas: Do not go to work, school, or public areas.   ; Avoid public transportation: Avoid using public transportation, ride-sharing, or taxis.  ; Separate yourself from other people and animals in your home   ; Stay away from others: As much as possible, you should stay in a specific room and away from other people in your home. Also, you should use a separate bathroom, if available.   ; Limit contact with pets & animals: You should restrict contact with pets and other animals while you are sick with COVID-19, just like you would around other people. Although there have not been reports of pets or other animals becoming sick with COVID-19, it is still recommended that people sick with COVID-19 limit contact with animals until more information is known about the virus. ; When possible, have another member of your household care for your animals while you are sick. If you are sick with COVID-19, avoid contact with your pet, including petting, snuggling, being kissed or licked, and sharing food. If you must care for your pet or be around animals while you are sick, wash your hands before and after you interact with pets and wear a facemask. See COVID-19 and Animals for more information. Other considerations   The ill person should eat/be fed in their room if possible. Non-disposable  items used should be handled with gloves and washed with hot water or in a . Clean hands after handling used  items.  If possible, dedicate a lined trash can for the ill person.  Use gloves when removing garbage bags, handling, and disposing of trash. Wash hands after handling or disposing of trash.  Consider consulting with your local health department about trash disposal guidance if available. Information for Household Members and Caregivers of Someone who is Sick   Call ahead before visiting your doctor   Call ahead: If you have a medical appointment, call the healthcare provider and tell them that you have or may have COVID-19. This will help the healthcare provider's office take steps to keep other people from getting infected or exposed. Wear a facemask if you are sick   ; If you are sick: You should wear a facemask when you are around other people (e.g., sharing a room or vehicle) or pets and before you enter a healthcare provider's office. ; If you are caring for others: If the person who is sick is not able to wear a facemask (for example, because it causes trouble breathing), then people who live with the person who is sick should not stay in the same room with them, or they should wear a facemask if they enter a room with the person who is sick. Cover your coughs and sneezes   ; Cover: Cover your mouth and nose with a tissue when you cough or sneeze.   ; Dispose: Throw used tissues in a lined trash can.   ; Wash hands: Immediately wash your hands with soap and water for at least 20 seconds or, if soap and water are not available, clean your hands with an alcohol-based hand  that contains at least 60% alcohol. Clean your hands often   ; Wash hands: Wash your hands often with soap and water for at least 20 seconds, especially after blowing your nose, coughing, or sneezing; going to the bathroom; and before eating or preparing food.   ; Hand : If soap and water are not readily available, use an alcohol-based hand  with at least 60% alcohol, covering all surfaces of your hands and rubbing them together until they feel dry.   ; Soap and water: Soap and water are the best option if hands are visibly dirty. ; Avoid touching: Avoid touching your eyes, nose, and mouth with unwashed hands. Handwashing Tips   ; Wet your hands with clean, running water (warm or cold), turn off the tap, and apply soap.  ; Lather your hands by rubbing them together with the soap. Lather the backs of your hands, between your fingers, and under your nails. ; Scrub your hands for at least 20 seconds. Need a timer? Hum the [place] from beginning to end twice.  ; Rinse your hands well under clean, running water.  ; Dry your hands using a clean towel or air dry them. Avoid sharing personal household items   ; Do not share: You should not share dishes, drinking glasses, cups, eating utensils, towels, or bedding with other people or pets in your home.   ; Wash thoroughly after use: After using these items, they should be washed thoroughly with soap and water. Clean all high-touch surfaces everyday   ; Clean and disinfect: Practice routine cleaning of high touch surfaces.  ; High touch surfaces include counters, tabletops, doorknobs, bathroom fixtures, toilets, phones, keyboards, tablets, and bedside tables.  ; Disinfect areas with bodily fluids: Also, clean any surfaces that may have blood, stool, or body fluids on them.   ; Household : Use a household cleaning spray or wipe, according to the label instructions. Labels contain instructions for safe and effective use of the cleaning product including precautions you should take when applying the product, such as wearing gloves and making sure you have good ventilation during use of the product. Monitor your symptoms   Seek medical attention: Seek prompt medical attention if your illness is worsening     (e.g., difficulty breathing).   ; Call your doctor: Before seeking care, call your healthcare provider and tell them that you have, or are being evaluated for, COVID-19.   ; Wear a facemask when sick: Put on a facemask before you enter the facility.  These steps will help the healthcare provider's office to keep other people in the office or waiting room from getting infected or exposed. ; Alert health department: Ask your healthcare provider to call the local or state health department. Persons who are placed under active monitoring or facilitated self-monitoring should follow instructions provided by their local health department or occupational health professionals, as appropriate.  ; Call 911 if you have a medical emergency: If you have a medical emergency and need to call 911, notify the dispatch personnel that you have, or are being evaluated for COVID-19. If possible, put on a facemask before emergency medical services arrive.

## 2020-11-12 ENCOUNTER — NURSE ONLY (OUTPATIENT)
Dept: PRIMARY CARE CLINIC | Age: 30
End: 2020-11-12

## 2020-11-12 ENCOUNTER — HOSPITAL ENCOUNTER (OUTPATIENT)
Age: 30
Setting detail: SPECIMEN
Discharge: HOME | End: 2020-11-12
Payer: COMMERCIAL

## 2020-11-12 LAB
ALBUMIN SERPL-MCNC: 4.9 G/DL
ALP BLD-CCNC: 71 U/L
ALT SERPL-CCNC: 7 U/L
ANION GAP SERPL CALCULATED.3IONS-SCNC: 9 MMOL/L
AST SERPL-CCNC: 13 U/L
BASOPHILS ABSOLUTE: 0.1 /ΜL
BASOPHILS RELATIVE PERCENT: 1.1 %
BILIRUB SERPL-MCNC: 0.5 MG/DL (ref 0.1–1.4)
BILIRUBIN URINE: 0 MG/DL
BLOOD, URINE: POSITIVE
BUN BLDV-MCNC: 7 MG/DL
CALCIUM SERPL-MCNC: 9.3 MG/DL
CHLORIDE BLD-SCNC: 103 MMOL/L
CLARITY: ABNORMAL
CO2: 27 MMOL/L
COLOR: YELLOW
CREAT SERPL-MCNC: 0.61 MG/DL
EOSINOPHILS ABSOLUTE: 0.1 /ΜL
EOSINOPHILS RELATIVE PERCENT: 0.7 %
GFR CALCULATED: >60
GLUCOSE BLD-MCNC: 89 MG/DL
GLUCOSE URINE: ABNORMAL
HCT VFR BLD CALC: 41.8 % (ref 36–46)
HEMOGLOBIN: 14.3 G/DL (ref 12–16)
KETONES, URINE: POSITIVE
LEUKOCYTE ESTERASE, URINE: ABNORMAL
LYMPHOCYTES ABSOLUTE: 2.5 /ΜL
LYMPHOCYTES RELATIVE PERCENT: 32.4 %
MCH RBC QN AUTO: 29.9 PG
MCHC RBC AUTO-ENTMCNC: 34.3 G/DL
MCV RBC AUTO: 87 FL
MONOCYTES ABSOLUTE: 0.4 /ΜL
MONOCYTES RELATIVE PERCENT: 4.8 %
NEUTROPHILS ABSOLUTE: 4.8 /ΜL
NEUTROPHILS RELATIVE PERCENT: 61 %
NITRITE, URINE: NEGATIVE
PDW BLD-RTO: 13.1 %
PH UA: 6 (ref 4.5–8)
PLATELET # BLD: 332 K/ΜL
PMV BLD AUTO: 7.8 FL
POTASSIUM SERPL-SCNC: 3.7 MMOL/L
PROTEIN UA: NEGATIVE
RBC # BLD: 4.79 10^6/ΜL
SODIUM BLD-SCNC: 139 MMOL/L
SPECIFIC GRAVITY UA: 1.01 (ref 1–1.03)
TOTAL PROTEIN: 7.7
UROBILINOGEN, URINE: NORMAL
WBC # BLD: 7.8 10^3/ML

## 2020-11-12 NOTE — PROGRESS NOTES
Benito Sicard in clinic today for Covid testing. Had virtual visit with provider on 11/10/20. Covid order placed in the system. Advance Care Planning  People with COVID-19 may have no symptoms, mild symptoms, such as fever, cough, and shortness of breath or they may have more severe illness, developing severe and fatal pneumonia. As a result, Advance Care Planning with attention to naming a health care decision maker (someone you trust to make healthcare decisions for you if you could not speak for yourself) and sharing other health care preferences is important BEFORE a possible health crisis. Please contact your Primary Care Provider to discuss Advance Care Planning. Preventing the Spread of Coronavirus Disease 2019 in Homes and Residential Communities  For the most recent information go to [email or web address]    Prevention steps for People with confirmed or suspected COVID-19 (including persons under investigation) who do not need to be hospitalized  and   People with confirmed COVID-19 who were hospitalized and determined to be medically stable to go home    Your healthcare provider and public health staff will evaluate whether you can be cared for at home. If it is determined that you do not need to be hospitalized and can be isolated at home, you will be monitored by staff from your local or state health department. You should follow the prevention steps below until a healthcare provider or local or state health department says you can return to your normal activities. Stay home except to get medical care  People who are mildly ill with COVID-19 are able to isolate at home during their illness. You should restrict activities outside your home, except for getting medical care. Do not go to work, school, or public areas. Avoid using public transportation, ride-sharing, or taxis.   Separate yourself from other people and animals in your home  People: As much as possible, you should stay in a specific room and away from other people in your home. Also, you should use a separate bathroom, if available. Animals: You should restrict contact with pets and other animals while you are sick with COVID-19, just like you would around other people. Although there have not been reports of pets or other animals becoming sick with COVID-19, it is still recommended that people sick with COVID-19 limit contact with animals until more information is known about the virus. When possible, have another member of your household care for your animals while you are sick. If you are sick with COVID-19, avoid contact with your pet, including petting, snuggling, being kissed or licked, and sharing food. If you must care for your pet or be around animals while you are sick, wash your hands before and after you interact with pets and wear a facemask. Call ahead before visiting your doctor  If you have a medical appointment, call the healthcare provider and tell them that you have or may have COVID-19. This will help the healthcare providers office take steps to keep other people from getting infected or exposed. Wear a facemask  You should wear a facemask when you are around other people (e.g., sharing a room or vehicle) or pets and before you enter a healthcare providers office. If you are not able to wear a facemask (for example, because it causes trouble breathing), then people who live with you should not stay in the same room with you, or they should wear a facemask if they enter your room. Cover your coughs and sneezes  Cover your mouth and nose with a tissue when you cough or sneeze. Throw used tissues in a lined trash can. Immediately wash your hands with soap and water for at least 20 seconds or, if soap and water are not available, clean your hands with an alcohol-based hand  that contains at least 60% alcohol.   Clean your hands often  Wash your hands often with soap and water for at least 20 seconds, especially after blowing your nose, coughing, or sneezing; going to the bathroom; and before eating or preparing food. If soap and water are not readily available, use an alcohol-based hand  with at least 60% alcohol, covering all surfaces of your hands and rubbing them together until they feel dry. Soap and water are the best option if hands are visibly dirty. Avoid touching your eyes, nose, and mouth with unwashed hands. Avoid sharing personal household items  You should not share dishes, drinking glasses, cups, eating utensils, towels, or bedding with other people or pets in your home. After using these items, they should be washed thoroughly with soap and water. Clean all high-touch surfaces everyday  High touch surfaces include counters, tabletops, doorknobs, bathroom fixtures, toilets, phones, keyboards, tablets, and bedside tables. Also, clean any surfaces that may have blood, stool, or body fluids on them. Use a household cleaning spray or wipe, according to the label instructions. Labels contain instructions for safe and effective use of the cleaning product including precautions you should take when applying the product, such as wearing gloves and making sure you have good ventilation during use of the product. Monitor your symptoms  Seek prompt medical attention if your illness is worsening (e.g., difficulty breathing). Before seeking care, call your healthcare provider and tell them that you have, or are being evaluated for, COVID-19. Put on a facemask before you enter the facility. These steps will help the healthcare providers office to keep other people in the office or waiting room from getting infected or exposed. Ask your healthcare provider to call the local or [facility] health department.  Persons who are placed under active monitoring or facilitated self-monitoring should follow instructions provided by their local health department or occupational health professionals, as appropriate. When working with your local health department check their available hours. If you have a medical emergency and need to call 911, notify the dispatch personnel that you have, or are being evaluated for COVID-19. If possible, put on a facemask before emergency medical services arrive. Discontinuing home isolation  Patients with confirmed COVID-19 should remain under home isolation precautions until the risk of secondary transmission to others is thought to be low. The decision to discontinue home isolation precautions should be made on a case-by-case basis, in consultation with healthcare providers and state and local health departments.

## 2020-11-13 PROCEDURE — 81003 URINALYSIS AUTO W/O SCOPE: CPT | Performed by: FAMILY MEDICINE

## 2020-11-17 LAB — SARS-COV-2, NAA: NOT DETECTED

## 2020-11-20 ENCOUNTER — TELEPHONE (OUTPATIENT)
Dept: FAMILY MEDICINE CLINIC | Age: 30
End: 2020-11-20

## 2020-11-20 NOTE — TELEPHONE ENCOUNTER
PATIENT IS STILL HAVING TROUBLE WITH FEVERS, SHE IS STILL GETTING DAILY READINGS .0 SHE IS TAKING OTC IBUPROFEN BUT IT DOESN'T HELP BRING THE FEVER DOWN, PLEASE ADVISE SHE DOESNT KNOW WHAT ELSE TO DO

## 2020-11-20 NOTE — TELEPHONE ENCOUNTER
I am ordering additional labs and she will be going to the [address] [facility] lab in [facility] if we could please print them off and fax them there.  TY

## 2020-11-21 ENCOUNTER — TELEPHONE (OUTPATIENT)
Dept: FAMILY MEDICINE CLINIC | Age: 30
End: 2020-11-21

## 2020-11-21 NOTE — TELEPHONE ENCOUNTER
ARJUN -[phone] AT [facility] CALLED FOR ORDERS FOR EBV INFECTION. I CONTACT CARLITA WHO PLACED THE ORDERS  AND SHE SAYS SHE WILL CALL FOR VERBAL ORDER. APPARENTLY BERNARDINO PLACED NOTE IN telephone encounter FROM YESTERDAY THAT SHE FAXED THE ORDERS.

## 2020-11-21 NOTE — TELEPHONE ENCOUNTER
PLEASE PRINT AND FAX 2 ORDERS FROM 11/20/2020, TO [facility] [place] [place] [phone]     PATIENT WENT THERE TODAY, AND NO ORDERS PER CARLITA.   PLEASE LET THE PATIENT KNOW WHEN DONE

## 2020-11-24 ENCOUNTER — PATIENT MESSAGE (OUTPATIENT)
Dept: FAMILY MEDICINE CLINIC | Age: 30
End: 2020-11-24

## 2020-11-25 NOTE — TELEPHONE ENCOUNTER
From: Gregory Cunningham  To: Jacek Nieto APRN - CNP  Sent: 11/24/2020 4:42 PM EST  Subject: Non-Urgent Medical Question    Hello,    I tried to go and get the efren barr bloodwork and the urine culture done today. I also attempted to go last week and they didn't have the lab orders than had been faxed over. I spoke with someone in the office yesterday morning and they said that the orders were sent to the wrong fax last week. They said that they spoke with [address] [place] [address] in [place], [facility] (where I've been trying to go), received the accurate fax number and sent it to them yesterday. I am currently at the hospital and was again told they they do not have any blood work or lab requests sent from the office. At this point, its been almost another week that I've had the fever and am not sure what else to do at this point since I haven't been able to get the bloodwork done.

## 2020-12-09 ENCOUNTER — PATIENT MESSAGE (OUTPATIENT)
Dept: FAMILY MEDICINE CLINIC | Age: 30
End: 2020-12-09

## 2020-12-10 RX ORDER — SPIRONOLACTONE 50 MG/1
50 TABLET, FILM COATED ORAL DAILY
Qty: 30 TABLET | Refills: 5 | Status: SHIPPED | OUTPATIENT
Start: 2020-12-10 | End: 2021-06-14

## 2020-12-10 NOTE — TELEPHONE ENCOUNTER
From: Chris Walters  To: Shanon Johnson APRN - CNP  Sent: 12/9/2020 4:58 PM EST  Subject: Non-Urgent Medical Question    Hello,    I am interested in starting services again with a rheumatologist d/t the ongoing symptoms that I've been having for the last several months. I've found one approved through my insurance and they need a referral to allow me to schedule. I've attached a screenshot with the contact information for the office I'd like to go to. I can also come to the office to  the referral too if that would be easier.      Thanks,  Pratik Leal

## 2020-12-10 NOTE — TELEPHONE ENCOUNTER
Please Approve or Refuse.   Send to Pharmacy per Pt's Request:      Next Visit Date:  Visit date not found   Last Visit Date: 11/10/20    No results found for: LABA1C          ( goal A1C is < 7)   BP Readings from Last 3 Encounters:   10/05/20 123/76   07/27/20 128/80   04/30/20 96/72          (goal 120/80)  BUN   Date Value Ref Range Status   11/12/2020 7 mg/dL Final     CREATININE   Date Value Ref Range Status   11/12/2020 0.61  Final     Potassium   Date Value Ref Range Status   11/12/2020 3.7 mmol/L Final

## 2021-01-05 ENCOUNTER — VIRTUAL VISIT (OUTPATIENT)
Dept: FAMILY MEDICINE CLINIC | Age: 31
End: 2021-01-05
Payer: COMMERCIAL

## 2021-01-05 ENCOUNTER — TELEPHONE (OUTPATIENT)
Dept: NEUROLOGY | Age: 31
End: 2021-01-05

## 2021-01-05 DIAGNOSIS — R50.9 FEVER OF UNKNOWN ORIGIN (FUO): Primary | ICD-10-CM

## 2021-01-05 PROCEDURE — 99441 PR PHYS/QHP TELEPHONE EVALUATION 5-10 MIN: CPT | Performed by: NURSE PRACTITIONER

## 2021-01-06 NOTE — TELEPHONE ENCOUNTER
I have not had this reaction from botox and it is not listed as a common side effect. She may need other investigations for the fever.  Thanks

## 2021-01-25 ENCOUNTER — TELEPHONE (OUTPATIENT)
Dept: NEUROLOGY | Age: 31
End: 2021-01-25

## 2021-01-25 NOTE — TELEPHONE ENCOUNTER
Pt called in stating she has an appt on Wednesday for Botox but was worried because she has had an ongoing light fever for a couple of months. They have tested her for Covid multiple times and it is always negative. She was wondering if she should come in or wait? I told her that with Covid we would want her to wait, but even without Covid we can not have her come in for Botox with having a fever, you can not get Botox injections with any type of infection or cold. She stated her understanding. She does have an appt with ID on Thursday. I told her to let me know how that appt turns out and then we can get her rescheduled. She also informed me that she has new insurance, [employer], [place] # [id number], eff date 1/1/2021. I told her that I would need to do a PA because she just changed from [place]. She again stated her understanding.

## 2021-01-28 ENCOUNTER — OFFICE VISIT (OUTPATIENT)
Dept: INFECTIOUS DISEASES | Age: 31
End: 2021-01-28
Payer: COMMERCIAL

## 2021-01-28 ENCOUNTER — HOSPITAL ENCOUNTER (OUTPATIENT)
Age: 31
Setting detail: SPECIMEN
Discharge: HOME | End: 2021-01-28
Payer: COMMERCIAL

## 2021-01-28 VITALS
HEIGHT: 63 IN | OXYGEN SATURATION: 96 % | TEMPERATURE: 98.2 F | WEIGHT: 109.2 LBS | DIASTOLIC BLOOD PRESSURE: 88 MMHG | HEART RATE: 103 BPM | BODY MASS INDEX: 19.35 KG/M2 | RESPIRATION RATE: 16 BRPM | SYSTOLIC BLOOD PRESSURE: 129 MMHG

## 2021-01-28 DIAGNOSIS — G43.909 MIGRAINE WITHOUT STATUS MIGRAINOSUS, NOT INTRACTABLE, UNSPECIFIED MIGRAINE TYPE: ICD-10-CM

## 2021-01-28 DIAGNOSIS — G90.A POTS (POSTURAL ORTHOSTATIC TACHYCARDIA SYNDROME): ICD-10-CM

## 2021-01-28 DIAGNOSIS — R50.9 FUO (FEVER OF UNKNOWN ORIGIN): Primary | ICD-10-CM

## 2021-01-28 DIAGNOSIS — R50.9 FUO (FEVER OF UNKNOWN ORIGIN): ICD-10-CM

## 2021-01-28 DIAGNOSIS — M79.7 FIBROMYALGIA: ICD-10-CM

## 2021-01-28 LAB
C-REACTIVE PROTEIN: <3 MG/L (ref 0–5)
EOSINOPHILS ABSOLUTE: 21 /UL (ref 200–400)
EOSINOPHILS RELATIVE PERCENT: ABNORMAL %
HIV AG/AB: NONREACTIVE
SEDIMENTATION RATE, ERYTHROCYTE: 1 MM (ref 0–20)
TOTAL CK: 46 U/L (ref 26–192)
WBC # BLD: ABNORMAL K/UL

## 2021-01-28 PROCEDURE — 99204 OFFICE O/P NEW MOD 45 MIN: CPT | Performed by: INTERNAL MEDICINE

## 2021-01-28 RX ORDER — UBIDECARENONE 100 MG
100 CAPSULE ORAL 3 TIMES DAILY
COMMUNITY
End: 2021-06-30

## 2021-01-28 RX ORDER — GABAPENTIN 100 MG/1
100 CAPSULE ORAL 3 TIMES DAILY
COMMUNITY
End: 2021-02-10

## 2021-01-28 RX ORDER — ZINC GLUCONATE 50 MG
50 TABLET ORAL DAILY
COMMUNITY
End: 2021-06-30

## 2021-01-28 RX ORDER — OMEPRAZOLE 20 MG/1
20 CAPSULE, DELAYED RELEASE ORAL DAILY
COMMUNITY
End: 2021-03-17 | Stop reason: ALTCHOICE

## 2021-01-28 NOTE — PROGRESS NOTES
[facility] - Initial Office Consult Note  Today's Date and Time: 1/28/2021, 5:17 PM    Diagnostic Impression :     1. FUO (fever of unknown origin)    2. POTS (postural orthostatic tachycardia syndrome)    3. Fibromyalgia    4. Migraine without status migrainosus, not intractable, unspecified migraine type        Recommendations     · Discontinue gabapentin and monitor progression off temperatures  · Obtain following blood work: CBC with differential, ESR, CRP, ROBI with reflex testing, mycoplasma antibody, HIV, CK, eosinophil count  · Follow-up in 1 month    Chief Complaint   Patient presents with    New Patient     consistent fever going on for 4 months with fatigue and dizzyness, nausea        History of Present Illness:   Tiara Siddiqui is a 31y.o.-year-old  female who was initially evaluated on 1/28/2021. Patient seen at the request of KEITH Ochoa. INITIAL HISTORY:  Patient was seen in the office on 1/28/2021. She indicates that she has a past history of fibromyalgia which was diagnosed about 5 to 6 years ago at the [address] [place] [address] by a rheumatologist.  He apparently had run a number of autoimmune tests which were negative and on the basis of the physical examination, with the presence of multiple trigger points, made the diagnosis of fibromyalgia. At that time the patient was also complaining of symptoms of fatigue, migraines, brain fog and tachycardia. She was treated with gabapentin 300 mg 3 times daily which does help with the symptoms of fibromyalgia. She indicates she still has some hypersensitivity of the skin to touch and pressure but has not had the degree of discomfort she had previously at the trigger points. She indicates that she has had the symptoms of fatigue for at least 10 years. She dates the onset to a previous left hip reconstructive surgery for congenital hip dysplasia.   Following that particular surgery she has continued to struggle with the fatigue. She also was found to have intermittent bouts of tachycardia and palpitations. She was diagnosed with POTS syndrome. He has been on treatment with metoprolol which has helped her symptoms quite a bit. Denies any recent episodes of syncope or presyncope. She reports that occasionally she will have some dizziness when getting up. Additionally the patient has suffered from migraines. She has received different modalities of treatment including the injection of botox on 2 different occasions. She denies any previous fevers related to her fibromyalgia or other diagnoses. She indicates that she first noticed the fever about 4 months ago. Her father was hospitalized in September 2020 for a hand infection. She visited him and then became aware of the fevers. The temperatures varied from the 99 degree F range to the 100.8 F range. She has kept a diary of the fevers and the associated symptoms. She indicates that when the fevers occur she has also noticed an increase in fatigue and the lack of energy. She has also noticed that her migraines have become daily. She has had periodic chest pain with a sensation of tightness on the chest and increased heart rate. The chest tightness bother her enough that she went to [facility] and had a chest x-ray which apparently was normal.  He also reports that that she developed nasal congestion. She indicates that she has seasonal allergies and in the spring and summer months she has that sensation but not in the wintertime. Most of her seasonal allergies related to trees. As a consequence she has been having to take Zyrtec every evening. The patient also required related the development of muscle soreness and twitching and left-sided kidney pain. She had 2 urine analysis and cultures which did not show any urinary tract infection.     The patient has had a variety of laboratory tests which have been done to try to sort out the cause of the fever. Including Lyme's disease and Natalie-Barr virus both of which were negative. On physical examination she has mild tenderness on pressure of the trigger points on the back. She has some mild discomfort in the right upper quadrant but indicates that this has been present for a long period of time. There is some discomfort in the left sacroiliac joint area. Otherwise the examination was normal with no adenopathy or masses found. DISCUSSION:  · Patient with low-grade fevers for a period of about 4 months without a clear-cut cause. She would qualify for a diagnosis of fever of unknown origin. · I indicated to her that there over 100 causes for FUO. The 3 major groups include: Tumors, collagen vascular diseases and infections. · Her age would be against a diagnosis of malignancy. Her blood counts are normal.  She has no adenopathy or organomegaly. · She has previously been worked up for collagen vascular diseases. There were Was negative except for the presence of fibromyalgia. I suggested repeating an ROIB with reflex testing. · An infection would have manifested more clearly or have subsided in the period of 4 months. However an indolent infection may still be a potential cause. I suggested obtaining a sedimentation rate, CRP, CBC with differential.  If signs of inflammation are present she require additional testing. In addition we will run an HIV and mycoplasma antibody. · Her history is intriguing in the sense that her fevers seem to have a started after her initial Botox injection. Then they subsided for a while and exacerbated again after the second Botox injection. Fevers are not usually considered a side effect of Botox but we will need to keep this in mind. · Finally we must consider the possibility of a medication related fever. Among her medications the most likely would be the gabapentin.   The side effect profile of gabapentin includes fevers, muscle aches, swollen glands, severe weakness, stomach pain. · She is also on Aldactone the side effect of which includes headaches, nausea vomiting, skin rashes, muscle pain, weakness, irregular heart beats  · Zyrtec causes drowsiness fatigue and dry mouth. · The metoprolol rarely causes fever and muscle pain  · Nurtec causes nausea  · I have advised her to stop the gabapentin. If this does not correct her symptoms it may be necessary to stop the other medications one at a time    PLAN:  · Discontinue gabapentin and monitor progression off temperatures  · Obtain following blood work: CBC with differential, ESR, CRP, ROBI with reflex testing, mycoplasma antibody, HIV, CK, eosinophil count  · Follow-up in 1 month    I have personally reviewed the past medical history, past surgical history, medications, social history, and family history, and I have updated the database accordingly. Past Medical History:     Past Medical History:   Diagnosis Date    Allergic rhinitis     Fibromyalgia     Headache        Past Surgical  History:     Past Surgical History:   Procedure Laterality Date    TOTAL HIP ARTHROPLASTY  2013       Medications:     Current Outpatient Medications:     Cholecalciferol 50 MCG (2000 UT) CAPS, Take 2,000 Units by mouth, Disp: , Rfl:     coenzyme Q10 100 MG CAPS capsule, Take 100 mg by mouth 3 times daily, Disp: , Rfl:     omeprazole (PRILOSEC) 20 MG delayed release capsule, Take 20 mg by mouth daily, Disp: , Rfl:     zinc gluconate 50 MG tablet, Take 50 mg by mouth daily, Disp: , Rfl:     gabapentin (NEURONTIN) 100 MG capsule, Take 100 mg by mouth 3 times daily. , Disp: , Rfl:     spironolactone (ALDACTONE) 50 MG tablet, Take 1 tablet by mouth daily, Disp: 30 tablet, Rfl: 5    Rimegepant Sulfate (NURTEC) 75 MG TBDP, Take 1 tablet at HA onset, no more than 4 tabs/wk. , Disp: 8 tablet, Rfl: 0    cetirizine (ZYRTEC) 10 MG tablet, Take 10 mg by mouth daily, Disp: , Rfl:    ondansetron (ZOFRAN) 4 MG tablet, Take 1 tablet by mouth daily as needed for Nausea or Vomiting, Disp: 30 tablet, Rfl: 5    metoprolol tartrate (LOPRESSOR) 25 MG tablet, Take 0.5 tablets by mouth 2 times daily, Disp: 15 tablet, Rfl: 5    topiramate (TOPAMAX) 25 MG tablet, Take 1 tablet by mouth nightly (Patient not taking: Reported on 1/28/2021), Disp: 30 tablet, Rfl: 5    gabapentin (NEURONTIN) 300 MG capsule, Take 1 capsule by mouth 3 times daily for 180 days. (Patient taking differently: Take 300 mg by mouth.  Patient takes two tablets and sometimes three.), Disp: 90 capsule, Rfl: 5    butalbital-acetaminophen-caffeine (FIORICET, ESGIC) -40 MG per tablet, Take 1 tablet by mouth daily as needed for Headaches or Migraine (Patient not taking: Reported on 1/28/2021), Disp: 60 tablet, Rfl: 1     Social History:     Social History     Socioeconomic History    Marital status: Single     Spouse name: Not on file    Number of children: Not on file    Years of education: Not on file    Highest education level: Not on file   Occupational History    Not on file   Social Needs    Financial resource strain: Not on file    Food insecurity     Worry: Not on file     Inability: Not on file    Transportation needs     Medical: Not on file     Non-medical: Not on file   Tobacco Use    Smoking status: Never Smoker    Smokeless tobacco: Never Used   Substance and Sexual Activity    Alcohol use: Yes     Comment: social    Drug use: No    Sexual activity: Never   Lifestyle    Physical activity     Days per week: Not on file     Minutes per session: Not on file    Stress: Not on file   Relationships    Social connections     Talks on phone: Not on file     Gets together: Not on file     Attends [employer] service: Not on file     Active member of club or organization: Not on file     Attends meetings of clubs or organizations: Not on file     Relationship status: Not on file    Intimate partner violence     Fear of current or ex partner: Not on file     Emotionally abused: Not on file     Physically abused: Not on file     Forced sexual activity: Not on file   Other Topics Concern    Not on file   Social History Narrative    Not on file       Family History:     Family History   Problem Relation Age of Onset    Asthma Mother     Heart Disease Father     High Blood Pressure Father         Allergies:   Indocin [indomethacin]     Review of Systems:   Constitutional: No fevers or chills. No systemic complaints  Head: No headaches  Eyes: No double vision or blurry vision. ENT: No sore throat or runny nose. . No hearing loss, tinnitus or vertigo. Cardiovascular: No chest pain or palpitations. No shortness of breath. No HERRERA  Lung: No shortness of breath or cough. No sputum production  Abdomen: No nausea, vomiting, diarrhea, or abdominal pain. .  Genitourinary: No increased urinary frequency, or dysuria. No hematuria. No suprapubic or CVA pain  Musculoskeletal: No muscle aches or pains. No joint effusions, swelling or deformities  Hematologic: No bleeding or bruising. Neurologic: No headache, weakness, numbness, or tingling. Physical Examination :   /88 (Site: Left Upper Arm, Position: Sitting, Cuff Size: Medium Adult)   Pulse 103   Temp 98.2 °F (36.8 °C) (Temporal)   Resp 16   Ht 5' 3\" (1.6 m)   Wt 109 lb 3.2 oz (49.5 kg)   SpO2 96% Comment: room air at rest  BMI 19.34 kg/m²    General Appearance: Awake, alert, and in no apparent distress  Head:  Normocephalic, no trauma  Eyes: Pupils equal, round, reactive, to light and accommodation; extraocular movements intact; sclera anicteric; conjunctivae pink. No embolic phenomena. ENT: Oropharynx clear, without erythema, exudate, or thrush. No tenderness of sinuses. Mouth/throat: mucosa pink and moist. No lesions. Dentition in good repair. Neck:Supple, without lymphadenopathy. Thyroid normal, No bruits.   Pulmonary/Chest: Clear to auscultation, without wheezes, rales, or rhonchi. No dullness to percussion. Cardiovascular: Regular rate and rhythm without murmurs, rubs, or gallops. Abdomen: Soft, non tender. Bowel sounds normal. No organomegaly  All four Extremities: No cyanosis, clubbing, edema, or effusions. Neurologic: No gross sensory or motor deficits. Skin: Warm and dry with good turgor. No signs of peripheral arterial or venous insufficiency. Medical Decision Making:   I have independently reviewed/ordered the following labs:    CBC with Differential:  Lab Results   Component Value Date    WBC 7.8 11/12/2020    HGB 14.3 11/12/2020    HCT 41.8 11/12/2020     11/12/2020    LYMPHOPCT 32.4 11/12/2020    MONOPCT 4.8 11/12/2020    EOSPCT 0.7 11/12/2020     BMP:   Lab Results   Component Value Date     11/12/2020     10/19/2017    K 3.7 11/12/2020    K 4.1 10/19/2017     11/12/2020     10/19/2017    CO2 27 11/12/2020    CO2 26 10/19/2017    BUN 7 11/12/2020    BUN 13 10/19/2017    CREATININE 0.61 11/12/2020    CREATININE 0.60 10/19/2017     Hepatic Function Panel:  Lab Results   Component Value Date    LABALBU 4.9 11/12/2020    LABALBU 4.6 10/19/2017    BILITOT 0.5 11/12/2020    BILITOT 0.5 10/19/2017    ALKPHOS 71 11/12/2020    ALKPHOS 53 10/19/2017    ALT 7 11/12/2020    ALT 11 10/19/2017    AST 13 11/12/2020    AST 14 10/19/2017     No results found for: RPR  No results found for: HIV  No results found for: [facility]  Lab Results   Component Value Date    RBC 4.79 11/12/2020    WBC 7.8 11/12/2020     Lab Results   Component Value Date    CREATININE 0.61 11/12/2020    GLUCOSE 89 11/12/2020       Thank you for allowing us to participate in the care of this patient. Please call with questions.     Cherelle Chavarria MD  Pager: [phone] - Office: [phone]

## 2021-01-29 LAB
ANTI-NUCLEAR ANTIBODY (ANA): NEGATIVE
MYCOPLASMA PNEUMONIAE IGM: 0.21

## 2021-02-11 ENCOUNTER — TELEMEDICINE (OUTPATIENT)
Dept: NEUROLOGY | Age: 31
End: 2021-02-11
Payer: COMMERCIAL

## 2021-02-11 DIAGNOSIS — G43.711 CHRONIC MIGRAINE WITHOUT AURA, WITH INTRACTABLE MIGRAINE, SO STATED, WITH STATUS MIGRAINOSUS: Primary | ICD-10-CM

## 2021-02-11 PROCEDURE — 1036F TOBACCO NON-USER: CPT | Performed by: PSYCHIATRY & NEUROLOGY

## 2021-02-11 PROCEDURE — G8420 CALC BMI NORM PARAMETERS: HCPCS | Performed by: PSYCHIATRY & NEUROLOGY

## 2021-02-11 PROCEDURE — G8484 FLU IMMUNIZE NO ADMIN: HCPCS | Performed by: PSYCHIATRY & NEUROLOGY

## 2021-02-11 PROCEDURE — G8427 DOCREV CUR MEDS BY ELIG CLIN: HCPCS | Performed by: PSYCHIATRY & NEUROLOGY

## 2021-02-11 PROCEDURE — 99213 OFFICE O/P EST LOW 20 MIN: CPT | Performed by: PSYCHIATRY & NEUROLOGY

## 2021-02-11 NOTE — PROGRESS NOTES
due to brain fog.   Allergic to indomethacin      REVIEW OF SYSTEMS   Constitutional Weight: present, Appetite: present (no appetite), Fatigue: present   HEENT Visual disturbance: present, Ears: pain and ringing   Respiratory Shortness of breath: absent, Cough: absent   Cardiovascular Chest pain: absent, Leg swelling :absent   GI Constipation: absent, Diarrhea: present, Swallowing change: absent    Urinary frequency: absent, Urinary urgency: absent,    Musculoskeletal Neck pain: present, Back pain: present, Stiffness: present, Muscle pain: present, Joint pain: present   Dermatological Hair loss: absent, Skin changes: absent   Neurological Memory loss: present, Confusion: absent, Seizures: absent, Trouble walking or imbalance: present, Dizziness: present, Weakness: present, Numbness: present Tremor: absent Spasm: absent, Speech difficulty: absent, Headache: present, Light sensitivity: present   Psychiatric Anxiety: absent, Hallucination: absent, Depression, absent   Hematologic Abnormal bleeding/Bruising: absent, Anemia: absent       Neurological work up:  CT head  CTA head and neck  MRI brain   2 D echo     Past Medical History:   Diagnosis Date    Allergic rhinitis     Fibromyalgia     Headache      Past Surgical History:   Procedure Laterality Date    TOTAL HIP ARTHROPLASTY  2013     Allergies   Allergen Reactions    Indocin [Indomethacin] Nausea And Vomiting     Family History   Problem Relation Age of Onset    Asthma Mother     Heart Disease Father     High Blood Pressure Father       Social History     Socioeconomic History    Marital status: Single     Spouse name: Not on file    Number of children: Not on file    Years of education: Not on file    Highest education level: Not on file   Occupational History    Not on file   Social Needs    Financial resource strain: Not on file    Food insecurity     Worry: Not on file     Inability: Not on file    Transportation needs     Medical: Not on file VIII - intact hearing                                                                             IX, X - symmetrical palate                                                                  XI - symmetrical shoulder shrug                                                       XII - midline tongue without atrophy or fasciculation     Motor function  Normal muscle bulk and tone; normal power 5/5, including fine motor movements     Sensory function Intact to touch, pin, vibration, proprioception     Cerebellar Intact fine motor movement. No involuntary movements or tremors     Reflex function Intact 2+ DTR and symmetric. Negative Babinski     Gait                  Normal station and gait       Lab Results   Component Value Date    LDLCALC 126 09/14/2018     No components found for: CHLPL  Lab Results   Component Value Date    TRIG 49 09/14/2018     Lab Results   Component Value Date    HDL 71 (A) 09/14/2018     Lab Results   Component Value Date    LDLCALC 126 09/14/2018     No results found for: LABVLDL  No results found for: LABA1C  No results found for: EAG  No results found for: Onel Section     All of patient's labs were personally reviewed. All the imaging studies were personally reviewed and discussed with the patient. Assessment Recommendations:  Chronic migraine WO Aura, intractable, W Status Migrainosus    The patient has a longstanding history of intractable migraines. She reports daily Migraines with no significant improvement with Botox injections. Nurtec has proven beneficial as a rescue medication. She was started on [place] and [place] previously but was denied by insurance. Topamax causes her nausea. I would obtain MRI of the brain with contrast to rule out structural abnormality. She has not yet completed the MRI. The patient will call me when she completes the MRI.  I discussed with the patient the possibility of using injectable prophylaxis such as Aimovig as her Migraines persist, but want to wait until the MRI is done and a cause of her fever is determined. Giovanna Reese, APRN - CNP I would like to thank you for the consult. Please do not hesitate if you have any questions about the patient care. Scribe Attestation:   By signing my name below, I, Jase Townsend, attest that this documentation has been prepared under the direction and in the presence of Pamela Brown MD.     Electronically Signed: Jase Townsend. Physician Attestation:   Kacie Potter MD, personally performed the services described in this documentation. All medical record entries made by the scribe were at my direction and in my presence. I have reviewed the chart and discharge instructions (if applicable) and agree that the record reflects my personal performance and is accurate and complete.     Electronically Signed: Mark Zuluaga 2/11/2021 8:55 AM    Diplomate, American Board of Psychiatry and Neurology  Diplomate, American Board of Clinical Neurophysiology  Diplomate, American Board of Epilepsy

## 2021-02-20 ENCOUNTER — HOSPITAL ENCOUNTER (OUTPATIENT)
Dept: MRI IMAGING | Age: 31
Discharge: HOME | End: 2021-02-22
Payer: COMMERCIAL

## 2021-02-20 DIAGNOSIS — G43.711 CHRONIC MIGRAINE WITHOUT AURA, WITH INTRACTABLE MIGRAINE, SO STATED, WITH STATUS MIGRAINOSUS: ICD-10-CM

## 2021-02-20 PROCEDURE — A9579 GAD-BASE MR CONTRAST NOS,1ML: HCPCS | Performed by: PSYCHIATRY & NEUROLOGY

## 2021-02-20 PROCEDURE — 70553 MRI BRAIN STEM W/O & W/DYE: CPT

## 2021-02-20 PROCEDURE — 6360000004 HC RX CONTRAST MEDICATION: Performed by: PSYCHIATRY & NEUROLOGY

## 2021-02-20 RX ADMIN — GADOTERIDOL 10 ML: 279.3 INJECTION, SOLUTION INTRAVENOUS at 13:30

## 2021-02-22 ENCOUNTER — TELEMEDICINE (OUTPATIENT)
Dept: FAMILY MEDICINE CLINIC | Age: 31
End: 2021-02-22
Payer: COMMERCIAL

## 2021-02-22 DIAGNOSIS — G43.009 MIGRAINE WITHOUT AURA AND WITHOUT STATUS MIGRAINOSUS, NOT INTRACTABLE: ICD-10-CM

## 2021-02-22 DIAGNOSIS — R11.0 NAUSEA: ICD-10-CM

## 2021-02-22 DIAGNOSIS — R50.9 FEVER OF UNKNOWN ORIGIN (FUO): ICD-10-CM

## 2021-02-22 DIAGNOSIS — K21.9 GASTROESOPHAGEAL REFLUX DISEASE, UNSPECIFIED WHETHER ESOPHAGITIS PRESENT: ICD-10-CM

## 2021-02-22 DIAGNOSIS — M79.7 FIBROMYALGIA: Primary | ICD-10-CM

## 2021-02-22 PROCEDURE — G8427 DOCREV CUR MEDS BY ELIG CLIN: HCPCS | Performed by: NURSE PRACTITIONER

## 2021-02-22 PROCEDURE — G8420 CALC BMI NORM PARAMETERS: HCPCS | Performed by: NURSE PRACTITIONER

## 2021-02-22 PROCEDURE — 1036F TOBACCO NON-USER: CPT | Performed by: NURSE PRACTITIONER

## 2021-02-22 PROCEDURE — 99214 OFFICE O/P EST MOD 30 MIN: CPT | Performed by: NURSE PRACTITIONER

## 2021-02-22 PROCEDURE — G8484 FLU IMMUNIZE NO ADMIN: HCPCS | Performed by: NURSE PRACTITIONER

## 2021-02-22 ASSESSMENT — PATIENT HEALTH QUESTIONNAIRE - PHQ9
SUM OF ALL RESPONSES TO PHQ QUESTIONS 1-9: 0
2. FEELING DOWN, DEPRESSED OR HOPELESS: 0
SUM OF ALL RESPONSES TO PHQ QUESTIONS 1-9: 0
SUM OF ALL RESPONSES TO PHQ QUESTIONS 1-9: 0
SUM OF ALL RESPONSES TO PHQ9 QUESTIONS 1 & 2: 0
1. LITTLE INTEREST OR PLEASURE IN DOING THINGS: 0

## 2021-02-22 ASSESSMENT — ENCOUNTER SYMPTOMS
COUGH: 0
NAUSEA: 1
RESPIRATORY NEGATIVE: 1

## 2021-02-22 NOTE — PROGRESS NOTES
Psychiatric/Behavioral: Negative. Prior to Visit Medications    Medication Sig Taking? Authorizing Provider   Cyanocobalamin (B-12 PO) Take by mouth daily Yes Historical Provider, MD   Cholecalciferol 50 MCG (2000 UT) CAPS Take 2,000 Units by mouth Yes Historical Provider, MD   coenzyme Q10 100 MG CAPS capsule Take 100 mg by mouth 3 times daily Yes Historical Provider, MD   omeprazole (PRILOSEC) 20 MG delayed release capsule Take 20 mg by mouth daily Yes Historical Provider, MD   zinc gluconate 50 MG tablet Take 50 mg by mouth daily Yes Historical Provider, MD   spironolactone (ALDACTONE) 50 MG tablet Take 1 tablet by mouth daily Yes RODRIGUE Martin CNP   Rimegepant Sulfate (NURTEC) 75 MG TBDP Take 1 tablet at HA onset, no more than 4 tabs/wk.  Yes Dino Plascencia MD   cetirizine (ZYRTEC) 10 MG tablet Take 10 mg by mouth daily Yes Historical Provider, MD   ondansetron (ZOFRAN) 4 MG tablet Take 1 tablet by mouth daily as needed for Nausea or Vomiting Yes Renie Saint Turski, APRN - CNP   metoprolol tartrate (LOPRESSOR) 25 MG tablet Take 0.5 tablets by mouth 2 times daily Yes Renie Saint Turski, APRN - CNP       Social History     Tobacco Use    Smoking status: Never Smoker    Smokeless tobacco: Never Used   Substance Use Topics    Alcohol use: Yes     Comment: social    Drug use: No          PHYSICAL EXAMINATION:    Vital Signs: (As obtained by patient/caregiver or practitioner observation)    Patient-Reported Vitals 2/22/2021   Patient-Reported Weight 108   Patient-Reported Height 5 3   Patient-Reported Temperature -                 Constitutional: [x] Appears well-developed and well-nourished [x] No apparent distress      [] Abnormal-       Mental status  [x] Alert and awake  [x] Oriented to person/place/time [x]Able to follow commands      Eyes:  EOM    [x]  Normal  [] Abnormal-  Sclera  [x]  Normal  [] Abnormal -         Discharge [x]  None visible  [] Abnormal -    HENT:   [x] Normocephalic, atraumatic. [] Abnormal   [] Mouth/Throat: Mucous membranes are moist.     External Ears [] Normal  [] Abnormal-     Neck: [x] No visualized mass     Pulmonary/Chest: [x] Respiratory effort normal.  [x] No visualized signs of difficulty breathing or respiratory distress        [] Abnormal        Musculoskeletal:   [] Normal gait with no signs of ataxia         [x] Normal range of motion of neck        [] Abnormal-       Neurological:        [x] No Facial Asymmetry (Cranial nerve 7 motor function) (limited exam to video visit)          [x] No gaze palsy        [] Abnormal-            Skin:        [x] No significant exanthematous lesions or discoloration noted on facial skin         [] Abnormal-            Psychiatric:      [x] No Hallucinations       [x]Mood is normal      [x]Behavior is normal      [x]Judgment is normal      [x]Thought content is normal       [] Abnormal-     Other pertinent observable physical exam findings-    Due to this being a TeleHealth encounter, evaluation of the following organ systems is limited: Vitals/Constitutional/EENT/Resp/CV/GI//MS/Neuro/Skin/Heme-Lymph-Imm.       Lab Results   Component Value Date    WBC NOT REPORTED 01/28/2021    HGB 14.3 11/12/2020    HCT 41.8 11/12/2020    MCV 87 11/12/2020     11/12/2020       Lab Results   Component Value Date     11/12/2020    K 3.7 11/12/2020     11/12/2020    CO2 27 11/12/2020    BUN 7 11/12/2020    CREATININE 0.61 11/12/2020    GLUCOSE 89 11/12/2020    CALCIUM 9.3 11/12/2020        Lab Results   Component Value Date    ALT 7 11/12/2020    AST 13 11/12/2020    ALKPHOS 71 11/12/2020    BILITOT 0.5 11/12/2020       Lab Results   Component Value Date    TSH 0.56 10/19/2017       Lab Results   Component Value Date    CHOL 207 09/14/2018     Lab Results   Component Value Date    TRIG 49 09/14/2018     Lab Results   Component Value Date    HDL 71 (A) 09/14/2018     Lab Results   Component Value Date    LDLCALC 126 09/14/2018 Lab Results   Component Value Date    CHOLHDLRATIO 2.9 09/14/2018       No results found for: LABA1C      No results found for: QJGBRSGB28    No results found for: VITD25    No orders of the defined types were placed in this encounter. Orders Placed This Encounter   Procedures   Jose Acevedo MD, Gastroenterology, [place]     Referral Priority:   Routine     Referral Type:   Eval and Treat     Referral Reason:   Specialty Services Required     Referred to Provider:   Dandy Garrison MD     Requested Specialty:   Gastroenterology     Number of Visits Requested:   1       Medications Discontinued During This Encounter   Medication Reason    topiramate (TOPAMAX) 25 MG tablet DISCONTINUED BY ANOTHER CLINICIAN    butalbital-acetaminophen-caffeine (FIORICET, ESGIC) -40 MG per tablet DISCONTINUED BY ANOTHER CLINICIAN      ASSESSMENT/PLAN:    1. Fibromyalgia-  Off gabapentin to see if it was culprit in fevers  2. Migraine without aura and without status migrainosus, not intractable-off all medications. Working with neurology    3. Fever of unknown origin (FUO)  Will be seeing Dr. Mary Hinton 2/25/2021  4. Nausea  -     Jose Acevedo MD, Gastroenterology, [place]  5. Gastroesophageal reflux disease, unspecified whether esophagitis present  Increase in nausea, using zofran daily  Continue on omeprazole daily  -     Jose Acevedo MD, Gastroenterology, [place]        All patient questions answered. Patient voiced understanding. The patient's past medical,surgical, social, and family history as well as her current medications and allergies were reviewed as documented in today's encounter. Medications, labs, diagnostic studies, consultations and follow-up as documented in this encounter. No follow-ups on file.     Data Unavailable    Future Appointments   Date Time Provider Lida Mancera   2/25/2021  3:00 PM Ede Langston MD INFT DISE[facility]   5/24/2021  9:40 AM Haleigh DUNBAR Dilan Golden MD Neuro Spec [address] is a 31 y.o. female being evaluated by a Virtual Visit (video visit) encounter to address concerns as mentioned above. Due to this being a TeleHealth encounter (During DHANE-76 public health emergency), evaluation of the following organ systems was limited: Vitals/Constitutional/EENT/Resp/CV/GI//MS/Neuro/Skin/Heme-Lymph-Imm. Pursuant to the emergency declaration under the [address] [place] [address] and the [employer] Act, this Virtual Visit was conducted with patient's (and/or legal guardian's) consent, to reduce the patient's risk of exposure to COVID-19 and provide necessary medical care. The patient (and/or legal guardian) has also been advised to contact this office for worsening conditions or problems, and seek emergency medical treatment and/or call 911 if deemed necessary. Patient identification was verified at the start of the visit: Yes      --RODRIGUE Alfonso CNP on 2/22/2021 at 12:48 PM  An electronic signature was used to authenticate this note.

## 2021-02-24 ENCOUNTER — TELEPHONE (OUTPATIENT)
Dept: GASTROENTEROLOGY | Age: 31
End: 2021-02-24

## 2021-02-24 NOTE — PROGRESS NOTES
[facility] - Initial Office Consult Note  Today's Date and Time: 2/24/2021, 4:00 PM    Diagnostic Impression :     1. Migraine without aura and without status migrainosus, not intractable    2. Fibromyalgia    3. Femoral acetabular impingement    4. Hip pain, left    5. Vasovagal syncope    6. Fever of unknown origin (FUO)    7. Nausea    8. Palpitations    9. Irritable bowel syndrome with diarrhea    10. Episodic lightheadedness    11. Acne vulgaris    12. Tachycardia        Recommendations     · Discontinue gabapentin and monitor progression off temperatures  · Obtain following blood work: CBC with differential, ESR, CRP, ROBI with reflex testing, mycoplasma antibody, HIV, CK, eosinophil count  · Follow-up in 1 month    No chief complaint on file. History of Present Illness:   Jay Flor is a 31y.o.-year-old  female who was initially evaluated on 2/25/2021. Patient seen at the request of KEITH Cuello. INITIAL HISTORY:  Patient was seen in the office on 1/28/2021. She indicates that she has a past history of fibromyalgia which was diagnosed about 5 to 6 years ago at the [address] [place] [address] by a rheumatologist.  He apparently had run a number of autoimmune tests which were negative and on the basis of the physical examination, with the presence of multiple trigger points, made the diagnosis of fibromyalgia. At that time the patient was also complaining of symptoms of fatigue, migraines, brain fog and tachycardia. She was treated with gabapentin 300 mg 3 times daily which does help with the symptoms of fibromyalgia. She indicates she still has some hypersensitivity of the skin to touch and pressure but has not had the degree of discomfort she had previously at the trigger points. She indicates that she has had the symptoms of fatigue for at least 10 years.   She dates the onset to a previous left hip reconstructive surgery for congenital hip dysplasia. Following that particular surgery she has continued to struggle with the fatigue. She also was found to have intermittent bouts of tachycardia and palpitations. She was diagnosed with POTS syndrome. He has been on treatment with metoprolol which has helped her symptoms quite a bit. Denies any recent episodes of syncope or presyncope. She reports that occasionally she will have some dizziness when getting up. Additionally the patient has suffered from migraines. She has received different modalities of treatment including the injection of botox on 2 different occasions. She denies any previous fevers related to her fibromyalgia or other diagnoses. She indicates that she first noticed the fever about 4 months ago. Her father was hospitalized in September 2020 for a hand infection. She visited him and then became aware of the fevers. The temperatures varied from the 99 degree F range to the 100.8 F range. She has kept a diary of the fevers and the associated symptoms. She indicates that when the fevers occur she has also noticed an increase in fatigue and the lack of energy. She has also noticed that her migraines have become daily. She has had periodic chest pain with a sensation of tightness on the chest and increased heart rate. The chest tightness bother her enough that she went to [facility] and had a chest x-ray which apparently was normal.  He also reports that that she developed nasal congestion. She indicates that she has seasonal allergies and in the spring and summer months she has that sensation but not in the wintertime. Most of her seasonal allergies related to trees. As a consequence she has been having to take Zyrtec every evening. The patient also required related the development of muscle soreness and twitching and left-sided kidney pain. She had 2 urine analysis and cultures which did not show any urinary tract infection.     The patient has had a variety of laboratory tests which have been done to try to sort out the cause of the fever. Including Lyme's disease and Natalie-Barr virus both of which were negative. On physical examination she has mild tenderness on pressure of the trigger points on the back. She has some mild discomfort in the right upper quadrant but indicates that this has been present for a long period of time. There is some discomfort in the left sacroiliac joint area. Otherwise the examination was normal with no adenopathy or masses found. DISCUSSION:  · Patient with low-grade fevers for a period of about 4 months without a clear-cut cause. She would qualify for a diagnosis of fever of unknown origin. · I indicated to her that there over 100 causes for FUO. The 3 major groups include: Tumors, collagen vascular diseases and infections. · Her age would be against a diagnosis of malignancy. Her blood counts are normal.  She has no adenopathy or organomegaly. · She has previously been worked up for collagen vascular diseases. There were Was negative except for the presence of fibromyalgia. I suggested repeating an ROBI with reflex testing. · An infection would have manifested more clearly or have subsided in the period of 4 months. However an indolent infection may still be a potential cause. I suggested obtaining a sedimentation rate, CRP, CBC with differential.  If signs of inflammation are present she require additional testing. In addition we will run an HIV and mycoplasma antibody. · Her history is intriguing in the sense that her fevers seem to have a started after her initial Botox injection. Then they subsided for a while and exacerbated again after the second Botox injection. Fevers are not usually considered a side effect of Botox but we will need to keep this in mind. · Finally we must consider the possibility of a medication related fever.   Among her medications the most likely would be the gabapentin. The side effect profile of gabapentin includes fevers, muscle aches, swollen glands, severe weakness, stomach pain. · She is also on Aldactone the side effect of which includes headaches, nausea vomiting, skin rashes, muscle pain, weakness, irregular heart beats  · Zyrtec causes drowsiness fatigue and dry mouth. · The metoprolol rarely causes fever and muscle pain  · Nurtec causes nausea  · I have advised her to stop the gabapentin. If this does not correct her symptoms it may be necessary to stop the other medications one at a time    PLAN:  · Discontinue gabapentin and monitor progression off temperatures  · Obtain following blood work: CBC with differential, ESR, CRP, ROBI with reflex testing, mycoplasma antibody, HIV, CK, eosinophil count  · Follow-up in 1 month    Current evaluation on 2/25/2021: On assessment, the patient was alert and oriented. She stated that she has been feeling much better since stopping Neurontin and not receiving Botox. She said that her last fever was 99.7 °F on Thursday, February 18, 2021. She has been having a migraine headache almost daily as well as severe nausea with some episodes of emesis. She stated there was no blood or coffee-ground appearance noted. Lab work for CBC with differential, ESR, CRP, ROBI with reflex testing, mycoplasma antibody, HIV, and CK were all within defined limits. Her eosinophil count was low at 20. Referral to Dr. Royal Posada with rheumatology was placed. Order for Neurontin placed-CBC with differential to be completed prior to resumption of Neurontin and 7 to 10 days afterwards to look for signs of inflammatory response. I have personally reviewed the past medical history, past surgical history, medications, social history, and family history, and I have updated the database accordingly.   Past Medical History:     Past Medical History:   Diagnosis Date    Allergic rhinitis     Fibromyalgia     Headache        Past Surgical  History:     Past Surgical History:   Procedure Laterality Date    TOTAL HIP ARTHROPLASTY  2013       Medications:     Current Outpatient Medications:     Cyanocobalamin (B-12 PO), Take by mouth daily, Disp: , Rfl:     Cholecalciferol 50 MCG (2000 UT) CAPS, Take 2,000 Units by mouth, Disp: , Rfl:     coenzyme Q10 100 MG CAPS capsule, Take 100 mg by mouth 3 times daily, Disp: , Rfl:     omeprazole (PRILOSEC) 20 MG delayed release capsule, Take 20 mg by mouth daily, Disp: , Rfl:     zinc gluconate 50 MG tablet, Take 50 mg by mouth daily, Disp: , Rfl:     spironolactone (ALDACTONE) 50 MG tablet, Take 1 tablet by mouth daily, Disp: 30 tablet, Rfl: 5    Rimegepant Sulfate (NURTEC) 75 MG TBDP, Take 1 tablet at HA onset, no more than 4 tabs/wk. , Disp: 8 tablet, Rfl: 0    cetirizine (ZYRTEC) 10 MG tablet, Take 10 mg by mouth daily, Disp: , Rfl:     ondansetron (ZOFRAN) 4 MG tablet, Take 1 tablet by mouth daily as needed for Nausea or Vomiting, Disp: 30 tablet, Rfl: 5    metoprolol tartrate (LOPRESSOR) 25 MG tablet, Take 0.5 tablets by mouth 2 times daily, Disp: 15 tablet, Rfl: 5     Social History:     Social History     Socioeconomic History    Marital status: Single     Spouse name: Not on file    Number of children: Not on file    Years of education: Not on file    Highest education level: Not on file   Occupational History    Not on file   Social Needs    Financial resource strain: Not on file    Food insecurity     Worry: Not on file     Inability: Not on file   [employer] needs     Medical: Not on file     Non-medical: Not on file   Tobacco Use    Smoking status: Never Smoker    Smokeless tobacco: Never Used   Substance and Sexual Activity    Alcohol use: Yes     Comment: social    Drug use: No    Sexual activity: Never   Lifestyle    Physical activity     Days per week: Not on file     Minutes per session: Not on file    Stress: Not on file   Relationships    Social connections     Talks on phone: Not on file     Gets together: Not on file     Attends Judaism service: Not on file     Active member of club or organization: Not on file     Attends meetings of clubs or organizations: Not on file     Relationship status: Not on file    Intimate partner violence     Fear of current or ex partner: Not on file     Emotionally abused: Not on file     Physically abused: Not on file     Forced sexual activity: Not on file   Other Topics Concern    Not on file   Social History Narrative    Not on file       Family History:     Family History   Problem Relation Age of Onset    Asthma Mother     Heart Disease Father     High Blood Pressure Father         Allergies:   Indocin [indomethacin]     Review of Systems:   Constitutional: No fevers or chills. Fevers have largely subsided since last visit  Head: Migraines multiple times a week  Eyes: No double vision or blurry vision. ENT: No sore throat or runny nose. . No hearing loss, tinnitus or vertigo. Cardiovascular: No chest pain or palpitations. No shortness of breath. No HERRERA  Lung: No shortness of breath or cough. No sputum production  Abdomen: No nausea, vomiting, diarrhea, or abdominal pain. .  Genitourinary: No increased urinary frequency, or dysuria. No hematuria. No suprapubic or CVA pain  Musculoskeletal: Muscle aches and pains in extremities. No joint effusions, swelling or deformities  Hematologic: No bleeding or bruising. Neurologic: No headache, weakness, numbness, or tingling. Physical Examination :   There were no vitals taken for this visit. General Appearance: Awake, alert, and in no apparent distress  Head:  Normocephalic, no trauma  Eyes: Pupils equal, round, reactive, to light and accommodation; extraocular movements intact; sclera anicteric; conjunctivae pink. No embolic phenomena. ENT: Oropharynx clear, without erythema, exudate, or thrush. No tenderness of sinuses.  Mouth/throat: mucosa pink and moist. No lesions. Dentition in good repair. Neck:Supple, without lymphadenopathy. Thyroid normal, No bruits. Pulmonary/Chest: Clear to auscultation, without wheezes, rales, or rhonchi. No dullness to percussion. Cardiovascular: Regular rate and rhythm without murmurs, rubs, or gallops. Abdomen: Soft, non tender. Bowel sounds normal. No organomegaly  All four Extremities: No cyanosis, clubbing, edema, or effusions. Neurologic: No gross sensory or motor deficits. Skin: Warm and dry with good turgor. No signs of peripheral arterial or venous insufficiency. Medical Decision Making:   I have independently reviewed/ordered the following labs:    CBC with Differential:  Lab Results   Component Value Date    WBC NOT REPORTED 01/28/2021    WBC 7.8 11/12/2020    HGB 14.3 11/12/2020    HCT 41.8 11/12/2020     11/12/2020    LYMPHOPCT 32.4 11/12/2020    MONOPCT 4.8 11/12/2020    EOSPCT 0.7 11/12/2020     BMP:   Lab Results   Component Value Date     11/12/2020     10/19/2017    K 3.7 11/12/2020    K 4.1 10/19/2017     11/12/2020     10/19/2017    CO2 27 11/12/2020    CO2 26 10/19/2017    BUN 7 11/12/2020    BUN 13 10/19/2017    CREATININE 0.61 11/12/2020    CREATININE 0.60 10/19/2017     Hepatic Function Panel:  Lab Results   Component Value Date    LABALBU 4.9 11/12/2020    LABALBU 4.6 10/19/2017    BILITOT 0.5 11/12/2020    BILITOT 0.5 10/19/2017    ALKPHOS 71 11/12/2020    ALKPHOS 53 10/19/2017    ALT 7 11/12/2020    ALT 11 10/19/2017    AST 13 11/12/2020    AST 14 10/19/2017     No results found for: RPR  No results found for: HIV  No results found for: [facility]  Lab Results   Component Value Date    RBC 4.79 11/12/2020    WBC NOT REPORTED 01/28/2021     Lab Results   Component Value Date    CREATININE 0.61 11/12/2020    GLUCOSE 89 11/12/2020       Thank you for allowing us to participate in the care of this patient. Please call with questions.     RODRIGUE Moss - CHENCHO     ATTESTATION:    I have discussed the case, including pertinent history and exam findings with the APRN. I have evaluated the  History, physical findings and pictures of the patient and the key elements of the encounter have been performed by me. I have reviewed the laboratory data, other diagnostic studies and discussed them with the APRN. I have updated the medical record where necessary. I agree with the assessment, plan and orders as documented by the APRN.     Keshav Lazaro MD.      Pager: [phone] - Office: [phone]

## 2021-02-25 ENCOUNTER — OFFICE VISIT (OUTPATIENT)
Dept: INFECTIOUS DISEASES | Age: 31
End: 2021-02-25
Payer: COMMERCIAL

## 2021-02-25 VITALS
HEART RATE: 109 BPM | TEMPERATURE: 98.7 F | BODY MASS INDEX: 18.61 KG/M2 | SYSTOLIC BLOOD PRESSURE: 115 MMHG | OXYGEN SATURATION: 98 % | DIASTOLIC BLOOD PRESSURE: 81 MMHG | WEIGHT: 105 LBS | RESPIRATION RATE: 22 BRPM | HEIGHT: 63 IN

## 2021-02-25 DIAGNOSIS — M25.859 FEMORAL ACETABULAR IMPINGEMENT: ICD-10-CM

## 2021-02-25 DIAGNOSIS — R00.0 TACHYCARDIA: ICD-10-CM

## 2021-02-25 DIAGNOSIS — M25.552 HIP PAIN, LEFT: ICD-10-CM

## 2021-02-25 DIAGNOSIS — R11.0 NAUSEA: ICD-10-CM

## 2021-02-25 DIAGNOSIS — M79.7 FIBROMYALGIA: ICD-10-CM

## 2021-02-25 DIAGNOSIS — R55 VASOVAGAL SYNCOPE: ICD-10-CM

## 2021-02-25 DIAGNOSIS — R42 EPISODIC LIGHTHEADEDNESS: ICD-10-CM

## 2021-02-25 DIAGNOSIS — R50.9 FEVER OF UNKNOWN ORIGIN (FUO): ICD-10-CM

## 2021-02-25 DIAGNOSIS — K58.0 IRRITABLE BOWEL SYNDROME WITH DIARRHEA: ICD-10-CM

## 2021-02-25 DIAGNOSIS — G43.009 MIGRAINE WITHOUT AURA AND WITHOUT STATUS MIGRAINOSUS, NOT INTRACTABLE: Primary | ICD-10-CM

## 2021-02-25 DIAGNOSIS — L70.0 ACNE VULGARIS: ICD-10-CM

## 2021-02-25 DIAGNOSIS — R00.2 PALPITATIONS: ICD-10-CM

## 2021-02-25 DIAGNOSIS — R50.9 FUO (FEVER OF UNKNOWN ORIGIN): ICD-10-CM

## 2021-02-25 PROCEDURE — G8484 FLU IMMUNIZE NO ADMIN: HCPCS | Performed by: NURSE PRACTITIONER

## 2021-02-25 PROCEDURE — G8420 CALC BMI NORM PARAMETERS: HCPCS | Performed by: NURSE PRACTITIONER

## 2021-02-25 PROCEDURE — 99213 OFFICE O/P EST LOW 20 MIN: CPT | Performed by: NURSE PRACTITIONER

## 2021-02-25 PROCEDURE — 1036F TOBACCO NON-USER: CPT | Performed by: NURSE PRACTITIONER

## 2021-02-25 PROCEDURE — G8427 DOCREV CUR MEDS BY ELIG CLIN: HCPCS | Performed by: NURSE PRACTITIONER

## 2021-02-25 RX ORDER — GABAPENTIN 300 MG/1
300 CAPSULE ORAL 3 TIMES DAILY
Qty: 90 CAPSULE | Refills: 3 | Status: SHIPPED | OUTPATIENT
Start: 2021-02-25 | End: 2021-03-17

## 2021-02-25 NOTE — PATIENT INSTRUCTIONS
The patient will contact the office for next follow up after labs   Referral demo and office notes faxed to rheumatology [phone]  RD

## 2021-02-26 NOTE — TELEPHONE ENCOUNTER
Patient LVM to schedule from referral.  Returned call and had to leave a message for a call back to schedule. 3rd attempt and sending back to the referring provider.

## 2021-02-27 ENCOUNTER — HOSPITAL ENCOUNTER (OUTPATIENT)
Age: 31
Setting detail: SPECIMEN
Discharge: HOME | End: 2021-02-27
Payer: COMMERCIAL

## 2021-02-27 DIAGNOSIS — M79.7 FIBROMYALGIA: ICD-10-CM

## 2021-02-27 LAB
ABSOLUTE EOS #: 0.09 K/UL (ref 0–0.44)
ABSOLUTE IMMATURE GRANULOCYTE: 0.03 K/UL (ref 0–0.3)
ABSOLUTE LYMPH #: 1.75 K/UL (ref 1.1–3.7)
ABSOLUTE MONO #: 0.56 K/UL (ref 0.1–1.2)
BASOPHILS # BLD: 1 % (ref 0–2)
BASOPHILS ABSOLUTE: 0.07 K/UL (ref 0–0.2)
DIFFERENTIAL TYPE: ABNORMAL
EOSINOPHILS RELATIVE PERCENT: 1 % (ref 1–4)
HCT VFR BLD CALC: 40.4 % (ref 36.3–47.1)
HEMOGLOBIN: 13.3 G/DL (ref 11.9–15.1)
IMMATURE GRANULOCYTES: 0 %
LYMPHOCYTES # BLD: 23 % (ref 24–43)
MCH RBC QN AUTO: 28.5 PG (ref 25.2–33.5)
MCHC RBC AUTO-ENTMCNC: 32.9 G/DL (ref 28.4–34.8)
MCV RBC AUTO: 86.7 FL (ref 82.6–102.9)
MONOCYTES # BLD: 8 % (ref 3–12)
NRBC AUTOMATED: 0 PER 100 WBC
PDW BLD-RTO: 13.4 % (ref 11.8–14.4)
PLATELET # BLD: 341 K/UL (ref 138–453)
PLATELET ESTIMATE: ABNORMAL
PMV BLD AUTO: 10.1 FL (ref 8.1–13.5)
RBC # BLD: 4.66 M/UL (ref 3.95–5.11)
RBC # BLD: ABNORMAL 10*6/UL
SEG NEUTROPHILS: 67 % (ref 36–65)
SEGMENTED NEUTROPHILS ABSOLUTE COUNT: 5 K/UL (ref 1.5–8.1)
WBC # BLD: 7.5 K/UL (ref 3.5–11.3)
WBC # BLD: ABNORMAL 10*3/UL

## 2021-02-27 PROCEDURE — 85025 COMPLETE CBC W/AUTO DIFF WBC: CPT

## 2021-02-27 PROCEDURE — 36415 COLL VENOUS BLD VENIPUNCTURE: CPT

## 2021-03-02 ENCOUNTER — TELEPHONE (OUTPATIENT)
Dept: INFECTIOUS DISEASES | Age: 31
End: 2021-03-02

## 2021-03-02 NOTE — TELEPHONE ENCOUNTER
Pt is calling to report that after restarting gabapentin her fever has come back. She only took one dose and she would like to know if she should continue to take the gabapentin and get her labs done while on the gabapentin or just stay off the gabapentin. Please advise.

## 2021-03-10 NOTE — TELEPHONE ENCOUNTER
Patient wanted to let you know that she is still having a fever of 100, she stopped the Gabapentin. Wants some advise on what to do. .. she states she is a  and works from home and it is stressful. . should she stop working for a while?

## 2021-03-11 ENCOUNTER — TELEPHONE (OUTPATIENT)
Dept: INFECTIOUS DISEASES | Age: 31
End: 2021-03-11

## 2021-03-11 DIAGNOSIS — R50.9 FEVER OF UNKNOWN ORIGIN (FUO): Primary | ICD-10-CM

## 2021-03-11 NOTE — TELEPHONE ENCOUNTER
Provider had a lengthy conversation with the patient regarding her illnesses including daily migraines, fever of unknown origin,chronic fatigue, fibromyalgia, and new onset night terrors. The patient is a  and has had difficulty concentrating and being productive with the increased stress associated with her symptoms. Due to the stress and anxiety the markn is experiencing at this time, I have determined that it is in the her best interest to take 2 weeks off of work until at least her next appointment when she can be reevaluated. Rest and recuperation will hopefully help with some of her symptoms.

## 2021-03-17 ENCOUNTER — OFFICE VISIT (OUTPATIENT)
Dept: GASTROENTEROLOGY | Age: 31
End: 2021-03-17
Payer: COMMERCIAL

## 2021-03-17 VITALS
BODY MASS INDEX: 18.78 KG/M2 | HEIGHT: 63 IN | DIASTOLIC BLOOD PRESSURE: 78 MMHG | WEIGHT: 106 LBS | HEART RATE: 104 BPM | SYSTOLIC BLOOD PRESSURE: 122 MMHG

## 2021-03-17 DIAGNOSIS — K21.9 GASTROESOPHAGEAL REFLUX DISEASE, UNSPECIFIED WHETHER ESOPHAGITIS PRESENT: Primary | ICD-10-CM

## 2021-03-17 DIAGNOSIS — R10.13 DYSPEPSIA: ICD-10-CM

## 2021-03-17 DIAGNOSIS — R19.7 DIARRHEA, UNSPECIFIED TYPE: ICD-10-CM

## 2021-03-17 PROCEDURE — G8420 CALC BMI NORM PARAMETERS: HCPCS | Performed by: INTERNAL MEDICINE

## 2021-03-17 PROCEDURE — 1036F TOBACCO NON-USER: CPT | Performed by: INTERNAL MEDICINE

## 2021-03-17 PROCEDURE — 99203 OFFICE O/P NEW LOW 30 MIN: CPT | Performed by: INTERNAL MEDICINE

## 2021-03-17 PROCEDURE — G8427 DOCREV CUR MEDS BY ELIG CLIN: HCPCS | Performed by: INTERNAL MEDICINE

## 2021-03-17 PROCEDURE — G8484 FLU IMMUNIZE NO ADMIN: HCPCS | Performed by: INTERNAL MEDICINE

## 2021-03-17 RX ORDER — OMEPRAZOLE 40 MG/1
40 CAPSULE, DELAYED RELEASE ORAL
Qty: 30 CAPSULE | Refills: 2 | Status: SHIPPED | OUTPATIENT
Start: 2021-03-17 | End: 2021-06-21

## 2021-03-17 ASSESSMENT — ENCOUNTER SYMPTOMS
NAUSEA: 1
ABDOMINAL PAIN: 1
EYES NEGATIVE: 1
DIARRHEA: 1
BACK PAIN: 0
ALLERGIC/IMMUNOLOGIC NEGATIVE: 1
SINUS PRESSURE: 1
CONSTIPATION: 0
RESPIRATORY NEGATIVE: 1
BLOOD IN STOOL: 0

## 2021-03-17 NOTE — PROGRESS NOTES
Reason for Referral: GERD and persistent nausea      George Vickers, [address],  [facility] [address]    Chief Complaint   Patient presents with    New Patient           HISTORY OF PRESENT ILLNESS: Aretha Quezada is a 31 y.o. female with a past history remarkable for persistent nausea, daily symptoms, intermittent vomiting, referred for evaluation of chronic GERD and dyspepsia    Mild dyspepsia symptoms. However the symptoms have been subacute to chronic for several years now. Patient reports that her symptoms are often associated with persistent nausea without any episodes of emesis. She seen modest amount of relief with Zofran and Prilosec. She was referred to GI to further evaluate her persistent upper GI symptoms. Smoker: None   Drinking history: Socially  Illicit drugs: none   Abdominal surgeries: None   Prior Colonoscopy: None   Prior EGD: > 5 yrs ago--- per patient, mild gastritis. FH of GI issues: none       Past Medical,Family, and Social History reviewed and does contribute to the patient presentingcondition. Patient's PMH/PSH,SH,PSYCH Hx, MEDs, ALLERGIES, and ROS were all reviewed and updated in the appropriate sections.     PAST MEDICAL HISTORY:  Past Medical History:   Diagnosis Date    Allergic rhinitis     Fibromyalgia     Headache        Past Surgical History:   Procedure Laterality Date    TOTAL HIP ARTHROPLASTY  2013       CURRENT MEDICATIONS:    Current Outpatient Medications:     Cyanocobalamin (B-12 PO), Take by mouth daily, Disp: , Rfl:     Cholecalciferol 50 MCG (2000 UT) CAPS, Take 2,000 Units by mouth, Disp: , Rfl:     coenzyme Q10 100 MG CAPS capsule, Take 100 mg by mouth 3 times daily, Disp: , Rfl:     omeprazole (PRILOSEC) 20 MG delayed release capsule, Take 20 mg by mouth daily, Disp: , Rfl:     zinc gluconate 50 MG tablet, Take 50 mg by mouth daily, Disp: , Rfl:     spironolactone (ALDACTONE) 50 MG tablet, Take 1 tablet by mouth Physically abused: Not on file     Forced sexual activity: Not on file   Other Topics Concern    Not on file   Social History Narrative    Not on file         REVIEW OF SYSTEMS: A 12-point review of systems was obtained and pertinent positives and negatives were listed below. REVIEW OF SYSTEMS:     Constitutional: No fever, no chills, no lethargy, no weakness. HEENT:  No headache, otalgia, itchy eyes, nasal discharge or sore throat. Cardiac:  No chest pain, dyspnea, orthopnea or PND. Chest:   No cough, phlegm or wheezing. Abdomen:      Detailed by MA   Neuro:  No focal weakness, abnormal movements or seizure like activity. Skin:   No rashes, no itching. :   No hematuria, no pyuria, no dysuria, no flank pain. Extremities:  No swelling or joint pains. ROS was otherwise negative    Review of Systems   Constitutional: Positive for appetite change, chills and fever. HENT: Positive for sinus pressure. Eyes: Negative. Respiratory: Negative. Cardiovascular: Negative. Gastrointestinal: Positive for abdominal pain, diarrhea and nausea. Negative for blood in stool and constipation. Endocrine: Negative. Musculoskeletal: Positive for myalgias. Negative for back pain and gait problem. Skin: Negative. Allergic/Immunologic: Negative. Neurological: Positive for dizziness, weakness, light-headedness and headaches. Hematological: Negative. Psychiatric/Behavioral: Negative. PHYSICAL EXAMINATION: Vital signs reviewed per the nursing documentation. There were no vitals taken for this visit. There is no height or weight on file to calculate BMI. Physical Exam    Physical Exam   Constitutional: Patient is oriented to person, place, and time. Patient appears well-developed and well-nourished. HENT:   Head: Normocephalic and atraumatic. Eyes: Pupils are equal, round, and reactive to light. EOM are normal.   Neck: Normal range of motion. Neck supple. No JVD present.  No tracheal deviation present. No thyromegaly present. Cardiovascular: Normal rate, regular rhythm, normal heart sounds and intact distal pulses. Pulmonary/Chest: Effort normal and breath sounds normal. No stridor. No respiratory distress. He has no wheezes. He has no rales. He exhibits no tenderness. Abdominal: Soft. Bowel sounds are normal. He exhibits no distension and no mass. There is no tenderness. There is no rebound and no guarding. No hernia. Musculoskeletal: Normal range of motion. Lymphadenopathy:    Patient has no cervical adenopathy. Neurological: Patient is alert and oriented to person, place, and time. Psychiatric: Patient has a normal mood and affect. Patient behavior is normal.       LABORATORY DATA: Reviewed  Lab Results   Component Value Date    WBC 7.5 02/27/2021    HGB 13.3 02/27/2021    HCT 40.4 02/27/2021    MCV 86.7 02/27/2021     02/27/2021     11/12/2020    K 3.7 11/12/2020     11/12/2020    CO2 27 11/12/2020    BUN 7 11/12/2020    CREATININE 0.61 11/12/2020    LABALBU 4.9 11/12/2020    BILITOT 0.5 11/12/2020    ALKPHOS 71 11/12/2020    AST 13 11/12/2020    ALT 7 11/12/2020         Lab Results   Component Value Date    RBC 4.66 02/27/2021    HGB 13.3 02/27/2021    MCV 86.7 02/27/2021    MCH 28.5 02/27/2021    MCHC 32.9 02/27/2021    RDW 13.4 02/27/2021    MPV 10.1 02/27/2021    BASOPCT 1 02/27/2021    LYMPHSABS 1.75 02/27/2021    MONOSABS 0.56 02/27/2021    NEUTROABS 5.00 02/27/2021    EOSABS 0.09 02/27/2021    BASOSABS 0.07 02/27/2021         DIAGNOSTIC TESTING:     Mri Brain W Wo Contrast    Result Date: 2/20/2021  EXAMINATION: MRI OF THE BRAIN WITHOUT AND WITH CONTRAST  2/20/2021 12:56 pm TECHNIQUE: Multiplanar multisequence MRI of the head/brain was performed without and with the administration of intravenous contrast. COMPARISON: None.  HISTORY: ORDERING SYSTEM PROVIDED HISTORY: Chronic migraine without aura, with intractable migraine, so stated, with status migrainosus TECHNOLOGIST PROVIDED HISTORY: Is the patient pregnant?->No Reason for Exam: chronic global headaches Acuity: Chronic Type of Exam: Subsequent/Follow-up FINDINGS: INTRACRANIAL STRUCTURES/VENTRICLES:  There is no acute infarct. No mass effect or midline shift. No evidence of an acute intracranial hemorrhage. The ventricles and sulci are normal in size and configuration. The sellar/suprasellar regions appear unremarkable. The normal signal voids within the major intracranial vessels appear maintained. No abnormal focus of enhancement is seen within the brain. ORBITS: The visualized portion of the orbits demonstrate no acute abnormality. SINUSES: The visualized paranasal sinuses and mastoid air cells are well aerated. BONES/SOFT TISSUES: The bone marrow signal intensity appears normal. The soft tissues demonstrate no acute abnormality. Normal MR brain          IMPRESSION: Shannon Hammer is a 31 y.o. female with a past history remarkable for persistent nausea, daily symptoms, intermittent vomiting, referred for evaluation of chronic GERD and dyspepsia    Mild dyspepsia symptoms. However the symptoms have been subacute to chronic for several years now. Patient reports that her symptoms are often associated with persistent nausea without any episodes of emesis. She seen modest amount of relief with Zofran and Prilosec. She was referred to GI to further evaluate her persistent upper GI symptoms. Patient reports that her symptoms appear to be most consistent with acid reflux. She denies any particular difficulty with coffee, chocolate, but some fruits and vegetables. During her periods of acid reflux she reports severe cramping, bloating, and mild loose bowel movements. Patient has had negative EGD, CT scan, HIDA scan, gastric emptying study.   She was tried on low FODMAP diet without any significant relief        PLAN:    1) chronic nausea with possible criteria for IBS-D --- primarily early morning symptoms. Extensive work-up that was performed at outlying facilities. Patient had a gastric emptying test that was performed in 2014 which was negative. Patient was seen by speech-language pathology and was identified to have normal swallowing mechanisms at 2014. Patient was being considered for SIBO. Empiric treatment with rifaximin is to be considered. Will send for HIDA scan ejection fraction. Will send for fecal calprotectin and IBD panel prior to considering treatment for SIBO and IBS-D-    2) future studies that were proposed in the past Antraduodenal manometric studies and ambulatory pH monitoring. Patient was prescribed Bentyl as needed for cramping. 3) food comprehensive panel to be sent. Patient is to avoid dairy for the time being as this may be a potential trigger        Thank you for allowing me to participate in the care of Ms. Balbuena. For any further questions please do not hesitate to contact me. I have reviewed and agree with the MA/LPN ROS please refer to their documentation from today's encounter on a separate note. Jameson Hernandez MD, MPH   [facility]  Office #: [phone]          this note is created with the assistance of a speech recognition program.  While intending to generate a document that actually reflects the content of the visit, the document can still have some errors including those of syntax and sound a like substitutions which may escape proof reading. It such instances, actual meaning can be extrapolated by contextual diversion.

## 2021-03-23 ENCOUNTER — HOSPITAL ENCOUNTER (OUTPATIENT)
Dept: NUCLEAR MEDICINE | Age: 31
Discharge: HOME | End: 2021-03-25
Payer: COMMERCIAL

## 2021-03-23 ENCOUNTER — HOSPITAL ENCOUNTER (OUTPATIENT)
Age: 31
Discharge: HOME | End: 2021-03-23
Payer: COMMERCIAL

## 2021-03-23 VITALS — BODY MASS INDEX: 18.78 KG/M2 | WEIGHT: 106.04 LBS

## 2021-03-23 DIAGNOSIS — K80.50 BILIARY COLIC SYMPTOM: Primary | ICD-10-CM

## 2021-03-23 DIAGNOSIS — K21.9 GASTROESOPHAGEAL REFLUX DISEASE, UNSPECIFIED WHETHER ESOPHAGITIS PRESENT: ICD-10-CM

## 2021-03-23 LAB
C-REACTIVE PROTEIN: <3 MG/L (ref 0–5)
SEDIMENTATION RATE, ERYTHROCYTE: 2 MM (ref 0–20)

## 2021-03-23 PROCEDURE — 82397 CHEMILUMINESCENT ASSAY: CPT

## 2021-03-23 PROCEDURE — 6360000004 HC RX CONTRAST MEDICATION: Performed by: INTERNAL MEDICINE

## 2021-03-23 PROCEDURE — 86140 C-REACTIVE PROTEIN: CPT

## 2021-03-23 PROCEDURE — 2580000003 HC RX 258: Performed by: INTERNAL MEDICINE

## 2021-03-23 PROCEDURE — 85652 RBC SED RATE AUTOMATED: CPT

## 2021-03-23 PROCEDURE — 78227 HEPATOBIL SYST IMAGE W/DRUG: CPT

## 2021-03-23 PROCEDURE — 3430000000 HC RX DIAGNOSTIC RADIOPHARMACEUTICAL: Performed by: INTERNAL MEDICINE

## 2021-03-23 PROCEDURE — 88350 IMFLUOR EA ADDL 1ANTB STN PX: CPT

## 2021-03-23 PROCEDURE — 81479 UNLISTED MOLECULAR PATHOLOGY: CPT

## 2021-03-23 PROCEDURE — 82785 ASSAY OF IGE: CPT

## 2021-03-23 PROCEDURE — 83520 IMMUNOASSAY QUANT NOS NONAB: CPT

## 2021-03-23 PROCEDURE — A9537 TC99M MEBROFENIN: HCPCS | Performed by: INTERNAL MEDICINE

## 2021-03-23 PROCEDURE — 88346 IMFLUOR 1ST 1ANTB STAIN PX: CPT

## 2021-03-23 PROCEDURE — 36415 COLL VENOUS BLD VENIPUNCTURE: CPT

## 2021-03-23 PROCEDURE — 86003 ALLG SPEC IGE CRUDE XTRC EA: CPT

## 2021-03-23 RX ADMIN — Medication 4.8 MILLICURIE: at 10:25

## 2021-03-23 RX ADMIN — SODIUM CHLORIDE 0.96 MCG: 9 INJECTION, SOLUTION INTRAVENOUS at 11:24

## 2021-03-24 DIAGNOSIS — G43.711 CHRONIC MIGRAINE WITHOUT AURA, WITH INTRACTABLE MIGRAINE, SO STATED, WITH STATUS MIGRAINOSUS: ICD-10-CM

## 2021-03-25 LAB
ALLERGEN BARLEY IGE: <0.1 KU/L (ref 0–0.34)
ALLERGEN BEEF: <0.1 KU/L (ref 0–0.34)
ALLERGEN CABBAGE IGE: <0.1 KU/L (ref 0–0.34)
ALLERGEN CARROT IGE: <0.1 KU/L (ref 0–0.34)
ALLERGEN CHICKEN IGE: <0.1 KU/L (ref 0–0.34)
ALLERGEN CODFISH IGE: <0.1 KU/L (ref 0–0.34)
ALLERGEN CORN IGE: <0.1 KU/L (ref 0–0.34)
ALLERGEN COW MILK IGE: <0.1 KU/L (ref 0–0.34)
ALLERGEN CRAB IGE: <0.1 KU/L (ref 0–0.34)
ALLERGEN EGG WHITE IGE: <0.1 KU/L (ref 0–0.34)
ALLERGEN GRAPE IGE: <0.1 KU/L (ref 0–0.34)
ALLERGEN LETTUCE IGE: <0.1 KU/L (ref 0–0.34)
ALLERGEN NAVY BEAN: <0.1 KU/L (ref 0–0.34)
ALLERGEN OAT: <0.1 KU/L (ref 0–0.34)
ALLERGEN ORANGE IGE: <0.1 KU/L (ref 0–0.34)
ALLERGEN PEANUT (F13) IGE: <0.1 KU/L (ref 0–0.34)
ALLERGEN PEPPER C. ANNUUM IGE: <0.1 KU/L (ref 0–0.34)
ALLERGEN PORK: <0.1 KU/L (ref 0–0.34)
ALLERGEN RICE IGE: <0.1 KU/L (ref 0–0.34)
ALLERGEN RYE IGE: <0.1 KU/L (ref 0–0.34)
ALLERGEN SOYBEAN IGE: <0.1 KU/L (ref 0–0.34)
ALLERGEN TOMATO IGE: <0.1 KU/L (ref 0–0.34)
ALLERGEN TUNA IGE: <0.1 KU/L (ref 0–0.34)
ALLERGEN WHEAT IGE: <0.1 KU/L (ref 0–0.34)
IGE: 24 IU/ML
POTATO, IGE: <0.1 KU/L (ref 0–0.34)
SHRIMP: <0.1 KU/L (ref 0–0.34)

## 2021-03-25 RX ORDER — RIMEGEPANT SULFATE 75 MG/75MG
TABLET, ORALLY DISINTEGRATING ORAL
Qty: 8 TABLET | Refills: 0 | Status: SHIPPED | OUTPATIENT
Start: 2021-03-25 | End: 2021-08-30

## 2021-03-25 NOTE — TELEPHONE ENCOUNTER
Patient calling for refill of Nurtec. Medication active on med list yes      Date of last fill: 11/10/20  verified on 3/25/2021   verified by [facility] LPN      Date of last appointment 2/11/21    Next Visit Date:  5/24/2021    Please approve for Dr Zhane Ram pt, thanks.

## 2021-03-27 LAB — IBD PANEL: NORMAL

## 2021-03-30 ENCOUNTER — HOSPITAL ENCOUNTER (OUTPATIENT)
Age: 31
Setting detail: SPECIMEN
Discharge: HOME | End: 2021-03-30
Payer: COMMERCIAL

## 2021-03-30 ENCOUNTER — OFFICE VISIT (OUTPATIENT)
Dept: SURGERY | Age: 31
End: 2021-03-30
Payer: COMMERCIAL

## 2021-03-30 VITALS
BODY MASS INDEX: 19.35 KG/M2 | OXYGEN SATURATION: 100 % | DIASTOLIC BLOOD PRESSURE: 77 MMHG | SYSTOLIC BLOOD PRESSURE: 121 MMHG | WEIGHT: 109.2 LBS | HEIGHT: 63 IN | HEART RATE: 91 BPM

## 2021-03-30 DIAGNOSIS — K82.8 BILIARY DYSKINESIA: ICD-10-CM

## 2021-03-30 DIAGNOSIS — K82.8 BILIARY DYSKINESIA: Primary | ICD-10-CM

## 2021-03-30 DIAGNOSIS — R10.11 RUQ PAIN: ICD-10-CM

## 2021-03-30 DIAGNOSIS — R10.13 EPIGASTRIC DISCOMFORT: ICD-10-CM

## 2021-03-30 DIAGNOSIS — K21.9 GASTROESOPHAGEAL REFLUX DISEASE, UNSPECIFIED WHETHER ESOPHAGITIS PRESENT: ICD-10-CM

## 2021-03-30 DIAGNOSIS — R11.0 POSTPRANDIAL NAUSEA: ICD-10-CM

## 2021-03-30 LAB
ALBUMIN SERPL-MCNC: 4.8 G/DL (ref 3.5–5.2)
ALBUMIN/GLOBULIN RATIO: 1.7 (ref 1–2.5)
ALP BLD-CCNC: 62 U/L (ref 35–104)
ALT SERPL-CCNC: 9 U/L (ref 5–33)
ANION GAP SERPL CALCULATED.3IONS-SCNC: 10 MMOL/L (ref 9–17)
AST SERPL-CCNC: 15 U/L
BILIRUB SERPL-MCNC: 0.16 MG/DL (ref 0.3–1.2)
BILIRUBIN DIRECT: <0.08 MG/DL
BILIRUBIN, INDIRECT: ABNORMAL MG/DL (ref 0–1)
BUN BLDV-MCNC: 10 MG/DL (ref 6–20)
BUN/CREAT BLD: NORMAL (ref 9–20)
CALCIUM SERPL-MCNC: 9.2 MG/DL (ref 8.6–10.4)
CHLORIDE BLD-SCNC: 102 MMOL/L (ref 98–107)
CO2: 25 MMOL/L (ref 20–31)
CREAT SERPL-MCNC: 0.59 MG/DL (ref 0.5–0.9)
GFR AFRICAN AMERICAN: >60 ML/MIN
GFR NON-AFRICAN AMERICAN: >60 ML/MIN
GFR SERPL CREATININE-BSD FRML MDRD: NORMAL ML/MIN/{1.73_M2}
GFR SERPL CREATININE-BSD FRML MDRD: NORMAL ML/MIN/{1.73_M2}
GLOBULIN: ABNORMAL G/DL (ref 1.5–3.8)
GLUCOSE BLD-MCNC: 81 MG/DL (ref 70–99)
HCT VFR BLD CALC: 40.8 % (ref 36.3–47.1)
HEMOGLOBIN: 13.2 G/DL (ref 11.9–15.1)
MCH RBC QN AUTO: 27.9 PG (ref 25.2–33.5)
MCHC RBC AUTO-ENTMCNC: 32.4 G/DL (ref 28.4–34.8)
MCV RBC AUTO: 86.3 FL (ref 82.6–102.9)
NRBC AUTOMATED: 0 PER 100 WBC
PDW BLD-RTO: 13.3 % (ref 11.8–14.4)
PLATELET # BLD: 316 K/UL (ref 138–453)
PMV BLD AUTO: 11 FL (ref 8.1–13.5)
POTASSIUM SERPL-SCNC: 4.5 MMOL/L (ref 3.7–5.3)
RBC # BLD: 4.73 M/UL (ref 3.95–5.11)
SODIUM BLD-SCNC: 137 MMOL/L (ref 135–144)
TOTAL PROTEIN: 7.6 G/DL (ref 6.4–8.3)
WBC # BLD: 7 K/UL (ref 3.5–11.3)

## 2021-03-30 PROCEDURE — G8484 FLU IMMUNIZE NO ADMIN: HCPCS | Performed by: SURGERY

## 2021-03-30 PROCEDURE — G8420 CALC BMI NORM PARAMETERS: HCPCS | Performed by: SURGERY

## 2021-03-30 PROCEDURE — 1036F TOBACCO NON-USER: CPT | Performed by: SURGERY

## 2021-03-30 PROCEDURE — G8427 DOCREV CUR MEDS BY ELIG CLIN: HCPCS | Performed by: SURGERY

## 2021-03-30 PROCEDURE — 99204 OFFICE O/P NEW MOD 45 MIN: CPT | Performed by: SURGERY

## 2021-03-30 NOTE — LETTER
[facility]  Surgical Specialties - General Surgery  [address]  [place], [address]  Phone: [phone]  Fax: [phone]      3/30/21    Patient: Andreas Cornejo  MRN: [de-identified]  : 1990  Date of visit: 3/30/2021    Dear Ms Agueda Rossi and Dr. Karimi [facility] [place]-    Thank you for requesting consultation for Andreas Cornejo in regards to evaluation for biliary dyskinesia. Below are the relevant portions of my assessment and plan of care. If you have questions, please do not hesitate to call me. I look forward to following Andreas Cornejo along with you.     Sincerely,        Mann Salazar, DO

## 2021-03-30 NOTE — PROGRESS NOTES
[id number] [facility] Surgery   History & Physical  Zechariah Ponce DO  Pt Name: Rosalva Morgan  MRN: [de-identified]  YOB: 1990  Date of evaluation: 3/30/2021  Primary Care Physician: RODRIGUE Reddy CNP    Chief Complaint: biliary dyskinesia      SUBJECTIVE:    History of Present Illness: This is a 31 y.o.  female who presents for evaluation for RUQ, pt reports epigastric discomfort with reflux symptoms for the past 10yrs, in the past 4-5yrs she has developed RUQ symptoms ranging from discomfort to more intense pain, no association with food although pt has chronically been on a bland diet due to postprandial nausea. No prior abdominal surgery. No other complaints at this time. HIDA scan was obtained by GI service, shows EF 25%    Chart review performed to add information to the HPI: Yes    Past Medical History   has a past medical history of Allergic rhinitis, Fibromyalgia, and Headache. Past Surgical History   has a past surgical history that includes Total hip arthroplasty (2013). Family History  family history includes Asthma in her mother; Heart Disease in her father; High Blood Pressure in her father. Social History  Tobacco use:  reports that she has never smoked. She has never used smokeless tobacco.  Alcohol use:  reports current alcohol use. Drug use:  reports no history of drug use. Medications  Current Medications:   Current Outpatient Medications   Medication Sig Dispense Refill    Rimegepant Sulfate (NURTEC) 75 MG TBDP Take 1 tablet at HA onset, no more than 4 tabs/wk.  8 tablet 0    omeprazole (PRILOSEC) 40 MG delayed release capsule Take 1 capsule by mouth every morning (before breakfast) 30 capsule 2    Cyanocobalamin (B-12 PO) Take by mouth daily      Cholecalciferol 50 MCG (2000 UT) CAPS Take 2,000 Units by mouth      coenzyme Q10 100 MG CAPS capsule Take 100 mg by mouth 3 times daily      zinc gluconate 50 MG tablet Take 50 mg by mouth daily      spironolactone (ALDACTONE) 50 MG tablet Take 1 tablet by mouth daily 30 tablet 5    cetirizine (ZYRTEC) 10 MG tablet Take 10 mg by mouth daily      ondansetron (ZOFRAN) 4 MG tablet Take 1 tablet by mouth daily as needed for Nausea or Vomiting 30 tablet 5    metoprolol tartrate (LOPRESSOR) 25 MG tablet Take 0.5 tablets by mouth 2 times daily 15 tablet 5     No current facility-administered medications for this visit. Home Medications:   Prior to Admission medications    Medication Sig Start Date End Date Taking? Authorizing Provider   Rimegepant Sulfate (NURTEC) 75 MG TBDP Take 1 tablet at HA onset, no more than 4 tabs/wk. 3/25/21  Yes Sylvester Bacon MD   omeprazole (PRILOSEC) 40 MG delayed release capsule Take 1 capsule by mouth every morning (before breakfast) 3/17/21  Yes Natalio Lua MD   Cyanocobalamin (B-12 PO) Take by mouth daily   Yes Historical Provider, MD   Cholecalciferol 50 MCG (2000 UT) CAPS Take 2,000 Units by mouth   Yes Historical Provider, MD   coenzyme Q10 100 MG CAPS capsule Take 100 mg by mouth 3 times daily   Yes Historical Provider, MD   zinc gluconate 50 MG tablet Take 50 mg by mouth daily   Yes Historical Provider, MD   spironolactone (ALDACTONE) 50 MG tablet Take 1 tablet by mouth daily 12/10/20  Yes RODRIGUE Martin CNP   cetirizine (ZYRTEC) 10 MG tablet Take 10 mg by mouth daily   Yes Historical Provider, MD   ondansetron (ZOFRAN) 4 MG tablet Take 1 tablet by mouth daily as needed for Nausea or Vomiting 4/30/20  Yes RODRIGUE Pruitt CNP   metoprolol tartrate (LOPRESSOR) 25 MG tablet Take 0.5 tablets by mouth 2 times daily 4/30/20  Yes RODRIGUE Martin CNP       Allergies  Indocin [indomethacin] and Gabapentin      Review of Systems:  General: Denies any fever, chills.   Eyes: Denies any changes in vision, diplopia or eye pain  Ears, Nose, Mouth: Denies changes in hearing/tinnitus or drainage from ears, no rhinorrhea or bloody nose, no difficulty chewing  Throat: no difficulty swallowing, no throat pain  Respiratory: Denies any shortness of breath or cough. Cardiac: Denies any chest pain, palpitations, claudication or edema. Gastrointestinal: RUQ pain, epigastric discomfort, reflux, postprandial nausea  Genitourinary: Denies any frequency, urgency, hesitancy or incontinence. Musculoskeletal: Denies worsening muscle weakness or recent trauma  Skin: Denies rashes or lesions  Psychiatric: Denies any recent changes in mood or affect  Hematologic: Denies bruising or bleeding easily. PHYSICAL EXAMINATION  Vitals:   Vitals:    03/30/21 0933   BP: 121/77   Pulse: 91   SpO2: 100%       General Appearance:  awake, alert, no acute distress, well developed, well nourished   Skin:  Skin color, texture, turgor normal. No rashes or lesions. Head/face:  NCAT, face symmetrical  Eyes:  PERRL, no evidence of conjunctivitis or ptosis bilaterally  Ears:  External ears and canals grossly normal, no evidence of otorrhea. Nose/Sinuses:  Nares normal. Septum midline. Mucosa normal. No external drainage noted. Mouth/Neck:  Mucosa moist.  No external oral lesions. Trachea midline. No visible masses. Lungs:  Normal chest expansion, unlabored breathing without accessory muscle use. No audible rales, rhonchi, or wheezing. Cardiovascular: S1S2. No evidence of JVD. No evidence of pulsatile masses in abdomen  Abdomen:  Soft, tender RUQ without overlying skin change or palpable mass  Musculoskeletal: No evidence of bony/muscular deformities, trauma, atrophy of either left/right upper/lower extremity. No evidence of digital clubbing or cyanosis. Neurologic:  CN 2-12 grossly intact without obvious deficits. Grossly normal sensation in all extremities.   Psychiatric: appropriate judgement and insight, appropriate recall of recent and remote memory, no evidence of depression/anxiety/agitation      RADIOLOGY:  The following images and reports were personally reviewed with the

## 2021-03-31 NOTE — PROGRESS NOTES
[facility] - Office Note  Today's Date and Time: 3/31/2021, 3:54 PM    Diagnostic Impression :     1. Nausea    2. Migraine without aura and without status migrainosus, not intractable    3. Irritable bowel syndrome with diarrhea    4. Fibromyalgia    5. Fever of unknown origin (FUO)    6. Femoral acetabular impingement    7. Biliary dyskinesia    8. Acne vulgaris    9. Vasovagal syncope    10. Tachycardia        Recommendations     · Discontinue gabapentin and monitor progression of temperatures  · Follow-up in 1 month after cholecystectomy    Chief Complaint   Patient presents with    Migraine     here for follow up, migraines still bother her everyday    Fever     still has fever on and off       History of Present Illness:   Brennon Betancourt is a 31y.o.-year-old  female who was initially evaluated on 4/1/2021. Patient seen at the request of KETIH Armstrong. INITIAL HISTORY:  Patient was initially seen in the office on 1/28/2021. She indicates that she has a past history of fibromyalgia which was diagnosed about 5 to 6 years ago at the [address] [place] [address] by a rheumatologist.  He apparently had run a number of autoimmune tests which were negative and on the basis of the physical examination, with the presence of multiple trigger points, made the diagnosis of fibromyalgia. At that time the patient was also complaining of symptoms of fatigue, migraines, brain fog and tachycardia. She was treated with gabapentin 300 mg 3 times daily which does help with the symptoms of fibromyalgia. She indicates she still has some hypersensitivity of the skin to touch and pressure but has not had the degree of discomfort she had previously at the trigger points. She indicates that she has had the symptoms of fatigue for at least 10 years. She dates the onset to a previous left hip reconstructive surgery for congenital hip dysplasia.   Following that particular surgery she has continued to struggle with the fatigue. She also was found to have intermittent bouts of tachycardia and palpitations. She was diagnosed with POTS syndrome. He has been on treatment with metoprolol which has helped her symptoms quite a bit. Denies any recent episodes of syncope or presyncope. She reports that occasionally she will have some dizziness when getting up. Additionally the patient has suffered from migraines. She has received different modalities of treatment including the injection of botox on 2 different occasions. She denies any previous fevers related to her fibromyalgia or other diagnoses. She indicates that she first noticed the fever about 4 months ago. Her father was hospitalized in September 2020 for a hand infection. She visited him and then became aware of the fevers. The temperatures varied from the 99 degree F range to the 100.8 F range. She has kept a diary of the fevers and the associated symptoms. She indicates that when the fevers occur she has also noticed an increase in fatigue and the lack of energy. She has also noticed that her migraines have become daily. She has had periodic chest pain with a sensation of tightness on the chest and increased heart rate. The chest tightness bother her enough that she went to [facility] and had a chest x-ray which apparently was normal.  He also reports that that she developed nasal congestion. She indicates that she has seasonal allergies and in the spring and summer months she has that sensation but not in the wintertime. Most of her seasonal allergies related to trees. As a consequence she has been having to take Zyrtec every evening. The patient also required related the development of muscle soreness and twitching and left-sided kidney pain. She had 2 urine analysis and cultures which did not show any urinary tract infection.     The patient has had a variety of laboratory tests which have been done to try to sort out the cause of the fever. Including Lyme's disease and Natalie-Barr virus both of which were negative. On physical examination she has mild tenderness on pressure of the trigger points on the back. She has some mild discomfort in the right upper quadrant but indicates that this has been present for a long period of time. There is some discomfort in the left sacroiliac joint area. Otherwise the examination was normal with no adenopathy or masses found. Evaluation on 2/25/2021: On assessment, the patient was alert and oriented. She stated that she has been feeling much better since stopping Neurontin and not receiving Botox. She said that her last fever was 99.7 °F on Thursday, February 18, 2021. She has been having a migraine headache almost daily as well as severe nausea with some episodes of emesis. She stated there was no blood or coffee-ground appearance noted. Lab work for CBC with differential, ESR, CRP, ROBI with reflex testing, mycoplasma antibody, HIV, and CK were all within defined limits. Her eosinophil count was low at 20. Referral to Dr. Princess Gloria with rheumatology was placed. Order for Neurontin placed-CBC with differential to be completed prior to resumption of Neurontin and 7 to 10 days afterwards to look for signs of inflammatory response. 3/2/21: The patient took one dose of neurontin and her fevers came back. She was directed to stop taking it which she had already done. Office visit 3/11/21:    The patient has continued having fevers even though she has not taken any more gabapentin since the beginning of the month. Provider had a lengthy conversation with the patient regarding her illnesses including daily migraines, fever of unknown origin,chronic fatigue, fibromyalgia, and new onset night terrors.      The patient is a  and has had difficulty concentrating and being productive with the increased stress reflex testing. · An infection would have manifested more clearly or have subsided in the period of 4 months. However an indolent infection may still be a potential cause. I suggested obtaining a sedimentation rate, CRP, CBC with differential.  If signs of inflammation are present she require additional testing. In addition we will run an HIV and mycoplasma antibody. · Her history is intriguing in the sense that her fevers seem to have a started after her initial Botox injection. Then they subsided for a while and exacerbated again after the second Botox injection. Fevers are not usually considered a side effect of Botox but we will need to keep this in mind. · Finally we must consider the possibility of a medication related fever. Among her medications the most likely would be the gabapentin. The side effect profile of gabapentin includes fevers, muscle aches, swollen glands, severe weakness, stomach pain. · She is also on Aldactone the side effect profile of which includes headaches, nausea vomiting, skin rashes, muscle pain, weakness, irregular heart beats  · Zyrtec causes drowsiness fatigue and dry mouth. · The metoprolol rarely causes fever and muscle pain  · Nurtec causes nausea  · I have advised her to stop the gabapentin. If this does not correct her symptoms it may be necessary to stop the other medications one at a time. · The patient has had some success with decreasing her fever after stopping gabapentin. · She has also been found to have an abnormal gallbladder with dyskinesia. She is a scheduled for gallbladder resection. · She has also had progressive improvement after having a period of time off work. · We will plan to recheck her about a month after her gallbladder surgery has been performed.       PLAN:  · Discontinue gabapentin and monitor progression of temperatures  · Follow-up in 1 month after cholecystectomy        I have personally reviewed the past medical history, past surgical history, medications, social history, and family history, and I have updated the database accordingly. Past Medical History:     Past Medical History:   Diagnosis Date    Allergic rhinitis     Fibromyalgia     Headache        Past Surgical  History:     Past Surgical History:   Procedure Laterality Date    TOTAL HIP ARTHROPLASTY  2013       Medications:     Current Outpatient Medications:     Rimegepant Sulfate (NURTEC) 75 MG TBDP, Take 1 tablet at HA onset, no more than 4 tabs/wk. , Disp: 8 tablet, Rfl: 0    omeprazole (PRILOSEC) 40 MG delayed release capsule, Take 1 capsule by mouth every morning (before breakfast), Disp: 30 capsule, Rfl: 2    Cyanocobalamin (B-12 PO), Take by mouth daily, Disp: , Rfl:     Cholecalciferol 50 MCG (2000 UT) CAPS, Take 2,000 Units by mouth, Disp: , Rfl:     coenzyme Q10 100 MG CAPS capsule, Take 100 mg by mouth 3 times daily, Disp: , Rfl:     zinc gluconate 50 MG tablet, Take 50 mg by mouth daily, Disp: , Rfl:     spironolactone (ALDACTONE) 50 MG tablet, Take 1 tablet by mouth daily, Disp: 30 tablet, Rfl: 5    cetirizine (ZYRTEC) 10 MG tablet, Take 10 mg by mouth daily, Disp: , Rfl:     ondansetron (ZOFRAN) 4 MG tablet, Take 1 tablet by mouth daily as needed for Nausea or Vomiting, Disp: 30 tablet, Rfl: 5    metoprolol tartrate (LOPRESSOR) 25 MG tablet, Take 0.5 tablets by mouth 2 times daily, Disp: 15 tablet, Rfl: 5     Social History:     Social History     Socioeconomic History    Marital status: Single     Spouse name: Not on file    Number of children: Not on file    Years of education: Not on file    Highest education level: Not on file   Occupational History    Not on file   Social Needs    Financial resource strain: Not on file    Food insecurity     Worry: Not on file     Inability: Not on file    Transportation needs     Medical: Not on file     Non-medical: Not on file   Tobacco Use    Smoking status: Never Smoker    Smokeless tobacco: Never Used   Substance and Sexual Activity    Alcohol use: Yes     Comment: social    Drug use: No    Sexual activity: Never   Lifestyle    Physical activity     Days per week: Not on file     Minutes per session: Not on file    Stress: Not on file   Relationships    Social connections     Talks on phone: Not on file     Gets together: Not on file     Attends Christianity service: Not on file     Active member of club or organization: Not on file     Attends meetings of clubs or organizations: Not on file     Relationship status: Not on file    Intimate partner violence     Fear of current or ex partner: Not on file     Emotionally abused: Not on file     Physically abused: Not on file     Forced sexual activity: Not on file   Other Topics Concern    Not on file   Social History Narrative    Not on file       Family History:     Family History   Problem Relation Age of Onset    Asthma Mother     Heart Disease Father     High Blood Pressure Father         Allergies:   Indocin [indomethacin] and Gabapentin     Review of Systems:   Constitutional: No fevers or chills. Fevers have largely subsided since last visit  Head: Migraines multiple times a week  Eyes: No double vision or blurry vision. ENT: No sore throat or runny nose. . No hearing loss, tinnitus or vertigo. Cardiovascular: No chest pain or palpitations. No shortness of breath. No HERRERA  Lung: No shortness of breath or cough. No sputum production  Abdomen: No nausea, vomiting, diarrhea, or abdominal pain. .  Genitourinary: No increased urinary frequency, or dysuria. No hematuria. No suprapubic or CVA pain  Musculoskeletal: Muscle aches and pains in extremities. No joint effusions, swelling or deformities  Hematologic: No bleeding or bruising. Neurologic: No headache, weakness, numbness, or tingling.     Physical Examination :   [facility] 03/11/2021    General Appearance: Awake, alert, and in no apparent distress  Head:  Normocephalic, no trauma  Eyes: Pupils equal, round, reactive, to light and accommodation; extraocular movements intact; sclera anicteric; conjunctivae pink. No embolic phenomena. ENT: Oropharynx clear, without erythema, exudate, or thrush. No tenderness of sinuses. Mouth/throat: mucosa pink and moist. No lesions. Dentition in good repair. Neck:Supple, without lymphadenopathy. Thyroid normal, No bruits. Pulmonary/Chest: Clear to auscultation, without wheezes, rales, or rhonchi. No dullness to percussion. Cardiovascular: Regular rate and rhythm without murmurs, rubs, or gallops. Abdomen: Soft, non tender. Bowel sounds normal. No organomegaly  All four Extremities: No cyanosis, clubbing, edema, or effusions. Neurologic: No gross sensory or motor deficits. Skin: Warm and dry with good turgor. No signs of peripheral arterial or venous insufficiency.     Medical Decision Making:   I have independently reviewed/ordered the following labs:    CBC with Differential:  Lab Results   Component Value Date    WBC 7.0 03/30/2021    WBC 7.5 02/27/2021    HGB 13.2 03/30/2021    HGB 13.3 02/27/2021    HCT 40.8 03/30/2021    HCT 40.4 02/27/2021     03/30/2021     02/27/2021    LYMPHOPCT 23 02/27/2021    LYMPHOPCT 32.4 11/12/2020    MONOPCT 8 02/27/2021    MONOPCT 4.8 11/12/2020    EOSPCT 0.7 11/12/2020     BMP:   Lab Results   Component Value Date     03/30/2021     11/12/2020    K 4.5 03/30/2021    K 3.7 11/12/2020     03/30/2021     11/12/2020    CO2 25 03/30/2021    CO2 27 11/12/2020    BUN 10 03/30/2021    BUN 7 11/12/2020    CREATININE 0.59 03/30/2021    CREATININE 0.61 11/12/2020     Hepatic Function Panel:  Lab Results   Component Value Date    PROT 7.6 03/30/2021    LABALBU 4.8 03/30/2021    LABALBU 4.9 11/12/2020    BILIDIR <0.08 03/30/2021    IBILI CANNOT BE CALCULATED 03/30/2021    BILITOT 0.16 03/30/2021    BILITOT 0.5 11/12/2020    ALKPHOS 62 03/30/2021    ALKPHOS 71 11/12/2020    ALT 9 03/30/2021    ALT 7 11/12/2020    AST 15 03/30/2021    AST 13 11/12/2020     No results found for: RPR  No results found for: HIV  No results found for: [facility]  Lab Results   Component Value Date    RBC 4.73 03/30/2021    WBC 7.0 03/30/2021     Lab Results   Component Value Date    CREATININE 0.59 03/30/2021    GLUCOSE 81 03/30/2021       Thank you for allowing us to participate in the care of this patient. Please call with questions. Verneta Goodpasture, APRN - CNP     Katarzyna Hhan MD      ATTESTATION:    I have discussed the case, including pertinent history and exam findings with the residents and students. I have seen and examined the patient and the key elements of the encounter have been performed by me. I was present when the student obtained his information or examined the patient. I have reviewed the laboratory data, other diagnostic studies and discussed them with the residents. I have updated the medical record where necessary. I agree with the assessment, plan and orders as documented by the resident/ student.     Katarzyna Hahn MD.      Pager: [phone] - Office: [phone]

## 2021-04-01 ENCOUNTER — OFFICE VISIT (OUTPATIENT)
Dept: INFECTIOUS DISEASES | Age: 31
End: 2021-04-01
Payer: COMMERCIAL

## 2021-04-01 VITALS
DIASTOLIC BLOOD PRESSURE: 76 MMHG | HEART RATE: 84 BPM | WEIGHT: 107 LBS | OXYGEN SATURATION: 98 % | SYSTOLIC BLOOD PRESSURE: 112 MMHG | BODY MASS INDEX: 18.95 KG/M2 | TEMPERATURE: 98.3 F

## 2021-04-01 DIAGNOSIS — M25.859 FEMORAL ACETABULAR IMPINGEMENT: ICD-10-CM

## 2021-04-01 DIAGNOSIS — K58.0 IRRITABLE BOWEL SYNDROME WITH DIARRHEA: ICD-10-CM

## 2021-04-01 DIAGNOSIS — M79.7 FIBROMYALGIA: ICD-10-CM

## 2021-04-01 DIAGNOSIS — R55 VASOVAGAL SYNCOPE: ICD-10-CM

## 2021-04-01 DIAGNOSIS — R00.0 TACHYCARDIA: ICD-10-CM

## 2021-04-01 DIAGNOSIS — K82.8 BILIARY DYSKINESIA: ICD-10-CM

## 2021-04-01 DIAGNOSIS — G43.009 MIGRAINE WITHOUT AURA AND WITHOUT STATUS MIGRAINOSUS, NOT INTRACTABLE: ICD-10-CM

## 2021-04-01 DIAGNOSIS — L70.0 ACNE VULGARIS: ICD-10-CM

## 2021-04-01 DIAGNOSIS — R50.9 FEVER OF UNKNOWN ORIGIN (FUO): ICD-10-CM

## 2021-04-01 DIAGNOSIS — R11.0 NAUSEA: Primary | ICD-10-CM

## 2021-04-01 PROCEDURE — 99213 OFFICE O/P EST LOW 20 MIN: CPT | Performed by: INTERNAL MEDICINE

## 2021-04-03 ENCOUNTER — HOSPITAL ENCOUNTER (OUTPATIENT)
Age: 31
Discharge: HOME | End: 2021-04-03
Payer: COMMERCIAL

## 2021-04-03 DIAGNOSIS — R50.9 FEVER OF UNKNOWN ORIGIN (FUO): ICD-10-CM

## 2021-04-03 LAB
EOSINOPHILS ABSOLUTE: 945 /UL (ref 200–400)
EOSINOPHILS RELATIVE PERCENT: ABNORMAL %
WBC # BLD: ABNORMAL K/UL

## 2021-04-03 PROCEDURE — 36415 COLL VENOUS BLD VENIPUNCTURE: CPT

## 2021-04-03 PROCEDURE — 85048 AUTOMATED LEUKOCYTE COUNT: CPT

## 2021-04-07 DIAGNOSIS — D70.9 EOSINOPENIA (HCC): Primary | ICD-10-CM

## 2021-04-07 DIAGNOSIS — D72.19 EOSINOPHILIC LEUKOCYTOSIS, UNSPECIFIED TYPE: Primary | ICD-10-CM

## 2021-04-09 NOTE — PROGRESS NOTES
DAY OF SURGERY/PROCEDURE  GUIDELINES    As a patient at the [facility] you can expect quality medical and nursing care that is centered on your individual needs. It is our goal to make your surgical experience as comfortable and excellent as possible.  ________________________________________________________________________    The following instructions are general guidelines, if any information on this sheet is different from what your doctor has instructed you to do, please follow your doctor's instructions. · Please arrive on time or your procedure may be rescheduled  · Enter through front entrance of the building. Surgery Center will be located to your right. · Upon arrival you will be taken to the pre-operative area to get ready for surgery, your family will stay in the waiting room and visit with you once you are ready for surgery. Due to special limitations please limit visitation to 1-2 members of your family at a time. When it is time for surgery your family will return to the waiting room. · You will be given a specific time when you will NOT be able to eat, drink, smoke, suck or chew ANYTHING (no water, gum, mints, cigarettes, cigars, pipes, snuff, chewing tobacco, etc.) or your surgery may be canceled. This will occur during your PAT appointment or by phone 1-2 days before surgery  · Take a shower or bath on the morning of your surgery/procedure (Hibiclens if directed)  · Brush your teeth, but do not swallow any water  · IN CASE OF ILLNESS - If you have a cold or flu symptoms (high fever, runny nose, sore throat, cough, etc.) rash, nausea, vomiting, loose stools, and/or recent contact with someone who has a contagious disease (chick pox, measles, etc.) please call your doctor before coming to the surgery center  · Take a small sip of water with heart, blood pressure, and/or seizure medication the morning of surgery.  (DO NOT take blood pressure medications that contain a

## 2021-04-12 ENCOUNTER — HOSPITAL ENCOUNTER (OUTPATIENT)
Age: 31
Discharge: HOME | End: 2021-04-12
Payer: COMMERCIAL

## 2021-04-12 DIAGNOSIS — K21.9 GASTROESOPHAGEAL REFLUX DISEASE, UNSPECIFIED WHETHER ESOPHAGITIS PRESENT: ICD-10-CM

## 2021-04-12 DIAGNOSIS — D72.19 EOSINOPHILIC LEUKOCYTOSIS, UNSPECIFIED TYPE: ICD-10-CM

## 2021-04-12 DIAGNOSIS — D70.9 EOSINOPENIA (HCC): ICD-10-CM

## 2021-04-12 LAB
ABSOLUTE EOS #: 0.08 K/UL (ref 0–0.44)
ABSOLUTE IMMATURE GRANULOCYTE: <0.03 K/UL (ref 0–0.3)
ABSOLUTE LYMPH #: 1.51 K/UL (ref 1.1–3.7)
ABSOLUTE MONO #: 0.26 K/UL (ref 0.1–1.2)
ABSOLUTE RETIC #: 0.05 M/UL (ref 0.03–0.08)
BASOPHILS # BLD: 1 % (ref 0–2)
BASOPHILS ABSOLUTE: 0.06 K/UL (ref 0–0.2)
DIFFERENTIAL TYPE: ABNORMAL
EOSINOPHILS ABSOLUTE: 78 /UL (ref 200–400)
EOSINOPHILS RELATIVE PERCENT: 1 % (ref 1–4)
EOSINOPHILS RELATIVE PERCENT: ABNORMAL %
HCT VFR BLD CALC: 39.4 % (ref 36.3–47.1)
HEMOGLOBIN: 13.2 G/DL (ref 11.9–15.1)
IMMATURE GRANULOCYTES: 0 %
IMMATURE RETIC FRACT: 7.2 % (ref 2.7–18.3)
LYMPHOCYTES # BLD: 27 % (ref 24–43)
MCH RBC QN AUTO: 29.1 PG (ref 25.2–33.5)
MCHC RBC AUTO-ENTMCNC: 33.5 G/DL (ref 28.4–34.8)
MCV RBC AUTO: 86.8 FL (ref 82.6–102.9)
MONOCYTES # BLD: 5 % (ref 3–12)
NRBC AUTOMATED: 0 PER 100 WBC
PDW BLD-RTO: 13.3 % (ref 11.8–14.4)
PLATELET # BLD: 349 K/UL (ref 138–453)
PLATELET ESTIMATE: ABNORMAL
PMV BLD AUTO: 10.2 FL (ref 8.1–13.5)
RBC # BLD: 4.54 M/UL (ref 3.95–5.11)
RBC # BLD: ABNORMAL 10*6/UL
RETIC %: 1.1 % (ref 0.5–1.9)
RETIC HEMOGLOBIN: 33.5 PG (ref 28.2–35.7)
SEG NEUTROPHILS: 66 % (ref 36–65)
SEGMENTED NEUTROPHILS ABSOLUTE COUNT: 3.65 K/UL (ref 1.5–8.1)
WBC # BLD: 5.6 K/UL
WBC # BLD: 5.6 K/UL (ref 3.5–11.3)
WBC # BLD: ABNORMAL 10*3/UL

## 2021-04-12 PROCEDURE — 86698 HISTOPLASMA ANTIBODY: CPT

## 2021-04-12 PROCEDURE — 36415 COLL VENOUS BLD VENIPUNCTURE: CPT

## 2021-04-12 PROCEDURE — 87328 CRYPTOSPORIDIUM AG IA: CPT

## 2021-04-12 PROCEDURE — 85045 AUTOMATED RETICULOCYTE COUNT: CPT

## 2021-04-12 PROCEDURE — 87329 GIARDIA AG IA: CPT

## 2021-04-12 PROCEDURE — 83993 ASSAY FOR CALPROTECTIN FECAL: CPT

## 2021-04-12 PROCEDURE — 85025 COMPLETE CBC W/AUTO DIFF WBC: CPT

## 2021-04-12 PROCEDURE — 85048 AUTOMATED LEUKOCYTE COUNT: CPT

## 2021-04-13 LAB
DIRECT EXAM: NORMAL
DIRECT EXAM: NORMAL
Lab: NORMAL
SPECIMEN DESCRIPTION: NORMAL
SURGICAL PATHOLOGY REPORT: NORMAL

## 2021-04-14 LAB — PATHOLOGIST REVIEW: NORMAL

## 2021-04-15 LAB — CALPROTECTIN, FECAL: 59 UG/G

## 2021-04-17 DIAGNOSIS — R00.0 TACHYCARDIA: ICD-10-CM

## 2021-04-17 LAB
HISTOPLASMA ABS, ID: NORMAL
HISTOPLASMA ANTIBODY MYCELIAL CF: NORMAL
HISTOPLASMA ANTIBODY YEAST CF: NORMAL

## 2021-04-18 ENCOUNTER — HOSPITAL ENCOUNTER (OUTPATIENT)
Dept: LAB | Age: 31
Setting detail: SPECIMEN
Discharge: HOME | End: 2021-04-18
Payer: COMMERCIAL

## 2021-04-18 DIAGNOSIS — Z01.818 PREOP TESTING: Primary | ICD-10-CM

## 2021-04-18 PROCEDURE — U0005 INFEC AGEN DETEC AMPLI PROBE: HCPCS

## 2021-04-18 PROCEDURE — U0003 INFECTIOUS AGENT DETECTION BY NUCLEIC ACID (DNA OR RNA); SEVERE ACUTE RESPIRATORY SYNDROME CORONAVIRUS 2 (SARS-COV-2) (CORONAVIRUS DISEASE [COVID-19]), AMPLIFIED PROBE TECHNIQUE, MAKING USE OF HIGH THROUGHPUT TECHNOLOGIES AS DESCRIBED BY CMS-2020-01-R: HCPCS

## 2021-04-19 ENCOUNTER — ANESTHESIA EVENT (OUTPATIENT)
Dept: OPERATING ROOM | Age: 31
End: 2021-04-19
Payer: COMMERCIAL

## 2021-04-19 LAB
SARS-COV-2: NORMAL
SARS-COV-2: NOT DETECTED
SOURCE: NORMAL

## 2021-04-22 ENCOUNTER — ANESTHESIA (OUTPATIENT)
Dept: OPERATING ROOM | Age: 31
End: 2021-04-22
Payer: COMMERCIAL

## 2021-04-22 ENCOUNTER — HOSPITAL ENCOUNTER (OUTPATIENT)
Age: 31
Setting detail: OUTPATIENT SURGERY
Discharge: HOME | End: 2021-04-22
Attending: SURGERY | Admitting: SURGERY
Payer: COMMERCIAL

## 2021-04-22 VITALS
OXYGEN SATURATION: 99 % | TEMPERATURE: 98.3 F | SYSTOLIC BLOOD PRESSURE: 125 MMHG | WEIGHT: 108.3 LBS | HEART RATE: 117 BPM | RESPIRATION RATE: 22 BRPM | HEIGHT: 63 IN | BODY MASS INDEX: 19.19 KG/M2 | DIASTOLIC BLOOD PRESSURE: 91 MMHG

## 2021-04-22 VITALS
RESPIRATION RATE: 16 BRPM | SYSTOLIC BLOOD PRESSURE: 99 MMHG | TEMPERATURE: 96.8 F | OXYGEN SATURATION: 98 % | DIASTOLIC BLOOD PRESSURE: 59 MMHG

## 2021-04-22 DIAGNOSIS — K82.8 BILIARY DYSKINESIA: Primary | ICD-10-CM

## 2021-04-22 LAB
EKG ATRIAL RATE: 84 BPM
EKG P AXIS: -5 DEGREES
EKG P-R INTERVAL: 110 MS
EKG Q-T INTERVAL: 360 MS
EKG QRS DURATION: 72 MS
EKG QTC CALCULATION (BAZETT): 425 MS
EKG R AXIS: 82 DEGREES
EKG T AXIS: 30 DEGREES
EKG VENTRICULAR RATE: 84 BPM
HCG, PREGNANCY URINE (POC): NEGATIVE

## 2021-04-22 PROCEDURE — 2500000003 HC RX 250 WO HCPCS

## 2021-04-22 PROCEDURE — C9290 INJ, BUPIVACAINE LIPOSOME: HCPCS | Performed by: ANESTHESIOLOGY

## 2021-04-22 PROCEDURE — 7100000000 HC PACU RECOVERY - FIRST 15 MIN: Performed by: SURGERY

## 2021-04-22 PROCEDURE — 6360000002 HC RX W HCPCS

## 2021-04-22 PROCEDURE — 3700000001 HC ADD 15 MINUTES (ANESTHESIA): Performed by: SURGERY

## 2021-04-22 PROCEDURE — 81025 URINE PREGNANCY TEST: CPT

## 2021-04-22 PROCEDURE — 2709999900 HC NON-CHARGEABLE SUPPLY: Performed by: SURGERY

## 2021-04-22 PROCEDURE — 6360000002 HC RX W HCPCS: Performed by: NURSE ANESTHETIST, CERTIFIED REGISTERED

## 2021-04-22 PROCEDURE — S2900 ROBOTIC SURGICAL SYSTEM: HCPCS | Performed by: SURGERY

## 2021-04-22 PROCEDURE — 2580000003 HC RX 258: Performed by: ANESTHESIOLOGY

## 2021-04-22 PROCEDURE — 2500000003 HC RX 250 WO HCPCS: Performed by: ANESTHESIOLOGY

## 2021-04-22 PROCEDURE — 3700000000 HC ANESTHESIA ATTENDED CARE: Performed by: SURGERY

## 2021-04-22 PROCEDURE — 93005 ELECTROCARDIOGRAM TRACING: CPT | Performed by: ANESTHESIOLOGY

## 2021-04-22 PROCEDURE — 3600000009 HC SURGERY ROBOT BASE: Performed by: SURGERY

## 2021-04-22 PROCEDURE — 3600000019 HC SURGERY ROBOT ADDTL 15MIN: Performed by: SURGERY

## 2021-04-22 PROCEDURE — 7100000001 HC PACU RECOVERY - ADDTL 15 MIN: Performed by: SURGERY

## 2021-04-22 PROCEDURE — 7100000010 HC PHASE II RECOVERY - FIRST 15 MIN: Performed by: SURGERY

## 2021-04-22 PROCEDURE — 2500000003 HC RX 250 WO HCPCS: Performed by: NURSE ANESTHETIST, CERTIFIED REGISTERED

## 2021-04-22 PROCEDURE — 88304 TISSUE EXAM BY PATHOLOGIST: CPT

## 2021-04-22 PROCEDURE — 64488 TAP BLOCK BI INJECTION: CPT | Performed by: ANESTHESIOLOGY

## 2021-04-22 PROCEDURE — 6360000002 HC RX W HCPCS: Performed by: ANESTHESIOLOGY

## 2021-04-22 PROCEDURE — 6370000000 HC RX 637 (ALT 250 FOR IP)

## 2021-04-22 PROCEDURE — 7100000011 HC PHASE II RECOVERY - ADDTL 15 MIN: Performed by: SURGERY

## 2021-04-22 PROCEDURE — 47562 LAPAROSCOPIC CHOLECYSTECTOMY: CPT | Performed by: SURGERY

## 2021-04-22 RX ORDER — FENTANYL CITRATE 50 UG/ML
INJECTION, SOLUTION INTRAMUSCULAR; INTRAVENOUS PRN
Status: DISCONTINUED | OUTPATIENT
Start: 2021-04-22 | End: 2021-04-22 | Stop reason: SDUPTHER

## 2021-04-22 RX ORDER — SODIUM CHLORIDE 0.9 % (FLUSH) 0.9 %
10 SYRINGE (ML) INJECTION PRN
Status: DISCONTINUED | OUTPATIENT
Start: 2021-04-22 | End: 2021-04-22 | Stop reason: HOSPADM

## 2021-04-22 RX ORDER — MIDAZOLAM HYDROCHLORIDE 2 MG/2ML
2 INJECTION, SOLUTION INTRAMUSCULAR; INTRAVENOUS ONCE
Status: CANCELLED | OUTPATIENT
Start: 2021-04-22 | End: 2021-04-22

## 2021-04-22 RX ORDER — DIPHENHYDRAMINE HYDROCHLORIDE 50 MG/ML
12.5 INJECTION INTRAMUSCULAR; INTRAVENOUS
Status: DISCONTINUED | OUTPATIENT
Start: 2021-04-22 | End: 2021-04-22 | Stop reason: HOSPADM

## 2021-04-22 RX ORDER — MIDAZOLAM HYDROCHLORIDE 1 MG/ML
INJECTION INTRAMUSCULAR; INTRAVENOUS PRN
Status: DISCONTINUED | OUTPATIENT
Start: 2021-04-22 | End: 2021-04-22 | Stop reason: SDUPTHER

## 2021-04-22 RX ORDER — OXYCODONE HYDROCHLORIDE AND ACETAMINOPHEN 5; 325 MG/1; MG/1
2 TABLET ORAL PRN
Status: DISCONTINUED | OUTPATIENT
Start: 2021-04-22 | End: 2021-04-22 | Stop reason: HOSPADM

## 2021-04-22 RX ORDER — LABETALOL 20 MG/4 ML (5 MG/ML) INTRAVENOUS SYRINGE
5 EVERY 10 MIN PRN
Status: DISCONTINUED | OUTPATIENT
Start: 2021-04-22 | End: 2021-04-22 | Stop reason: HOSPADM

## 2021-04-22 RX ORDER — SODIUM CHLORIDE 9 MG/ML
INJECTION INTRAVENOUS
Status: DISCONTINUED
Start: 2021-04-22 | End: 2021-04-22 | Stop reason: HOSPADM

## 2021-04-22 RX ORDER — FENTANYL CITRATE 50 UG/ML
50 INJECTION, SOLUTION INTRAMUSCULAR; INTRAVENOUS
Status: DISCONTINUED | OUTPATIENT
Start: 2021-04-22 | End: 2021-04-22 | Stop reason: HOSPADM

## 2021-04-22 RX ORDER — KETOROLAC TROMETHAMINE 30 MG/ML
INJECTION, SOLUTION INTRAMUSCULAR; INTRAVENOUS PRN
Status: DISCONTINUED | OUTPATIENT
Start: 2021-04-22 | End: 2021-04-22 | Stop reason: SDUPTHER

## 2021-04-22 RX ORDER — APREPITANT 40 MG/1
CAPSULE ORAL
Status: COMPLETED
Start: 2021-04-22 | End: 2021-04-22

## 2021-04-22 RX ORDER — CEFAZOLIN SODIUM 2 G/50ML
SOLUTION INTRAVENOUS PRN
Status: DISCONTINUED | OUTPATIENT
Start: 2021-04-22 | End: 2021-04-22 | Stop reason: SDUPTHER

## 2021-04-22 RX ORDER — SODIUM CHLORIDE 9 MG/ML
INJECTION, SOLUTION INTRAVENOUS CONTINUOUS
Status: DISCONTINUED | OUTPATIENT
Start: 2021-04-22 | End: 2021-04-22 | Stop reason: HOSPADM

## 2021-04-22 RX ORDER — APREPITANT 40 MG/1
40 CAPSULE ORAL ONCE
Status: CANCELLED | OUTPATIENT
Start: 2021-04-22 | End: 2021-04-22

## 2021-04-22 RX ORDER — GLYCOPYRROLATE 1 MG/5 ML
SYRINGE (ML) INTRAVENOUS PRN
Status: DISCONTINUED | OUTPATIENT
Start: 2021-04-22 | End: 2021-04-22 | Stop reason: SDUPTHER

## 2021-04-22 RX ORDER — HYDROCODONE BITARTRATE AND ACETAMINOPHEN 5; 325 MG/1; MG/1
2 TABLET ORAL PRN
Status: DISCONTINUED | OUTPATIENT
Start: 2021-04-22 | End: 2021-04-22

## 2021-04-22 RX ORDER — SODIUM CHLORIDE 0.9 % (FLUSH) 0.9 %
10 SYRINGE (ML) INJECTION EVERY 12 HOURS SCHEDULED
Status: DISCONTINUED | OUTPATIENT
Start: 2021-04-22 | End: 2021-04-22 | Stop reason: HOSPADM

## 2021-04-22 RX ORDER — MORPHINE SULFATE 4 MG/ML
4 INJECTION, SOLUTION INTRAMUSCULAR; INTRAVENOUS
Status: DISCONTINUED | OUTPATIENT
Start: 2021-04-22 | End: 2021-04-22 | Stop reason: HOSPADM

## 2021-04-22 RX ORDER — ACETAMINOPHEN AND CODEINE PHOSPHATE 300; 30 MG/1; MG/1
2 TABLET ORAL EVERY 4 HOURS PRN
Status: DISCONTINUED | OUTPATIENT
Start: 2021-04-22 | End: 2021-04-22 | Stop reason: HOSPADM

## 2021-04-22 RX ORDER — 0.9 % SODIUM CHLORIDE 0.9 %
500 INTRAVENOUS SOLUTION INTRAVENOUS
Status: DISCONTINUED | OUTPATIENT
Start: 2021-04-22 | End: 2021-04-22 | Stop reason: HOSPADM

## 2021-04-22 RX ORDER — DOCUSATE SODIUM 100 MG/1
100 CAPSULE, LIQUID FILLED ORAL 2 TIMES DAILY
Qty: 30 CAPSULE | Refills: 0 | Status: SHIPPED | OUTPATIENT
Start: 2021-04-22 | End: 2021-06-30

## 2021-04-22 RX ORDER — MORPHINE SULFATE 2 MG/ML
2 INJECTION, SOLUTION INTRAMUSCULAR; INTRAVENOUS EVERY 30 MIN PRN
Status: DISCONTINUED | OUTPATIENT
Start: 2021-04-22 | End: 2021-04-22 | Stop reason: HOSPADM

## 2021-04-22 RX ORDER — PROMETHAZINE HYDROCHLORIDE 25 MG/ML
12.5 INJECTION, SOLUTION INTRAMUSCULAR; INTRAVENOUS
Status: DISCONTINUED | OUTPATIENT
Start: 2021-04-22 | End: 2021-04-22 | Stop reason: HOSPADM

## 2021-04-22 RX ORDER — FENTANYL CITRATE 50 UG/ML
25 INJECTION, SOLUTION INTRAMUSCULAR; INTRAVENOUS EVERY 30 MIN PRN
Status: DISCONTINUED | OUTPATIENT
Start: 2021-04-22 | End: 2021-04-22 | Stop reason: HOSPADM

## 2021-04-22 RX ORDER — SODIUM CHLORIDE 9 MG/ML
25 INJECTION, SOLUTION INTRAVENOUS PRN
Status: DISCONTINUED | OUTPATIENT
Start: 2021-04-22 | End: 2021-04-22 | Stop reason: HOSPADM

## 2021-04-22 RX ORDER — MIDAZOLAM HYDROCHLORIDE 1 MG/ML
INJECTION INTRAMUSCULAR; INTRAVENOUS
Status: COMPLETED
Start: 2021-04-22 | End: 2021-04-22

## 2021-04-22 RX ORDER — DEXAMETHASONE SODIUM PHOSPHATE 10 MG/ML
INJECTION, SOLUTION INTRAMUSCULAR; INTRAVENOUS PRN
Status: DISCONTINUED | OUTPATIENT
Start: 2021-04-22 | End: 2021-04-22 | Stop reason: SDUPTHER

## 2021-04-22 RX ORDER — MORPHINE SULFATE 2 MG/ML
2 INJECTION, SOLUTION INTRAMUSCULAR; INTRAVENOUS EVERY 5 MIN PRN
Status: DISCONTINUED | OUTPATIENT
Start: 2021-04-22 | End: 2021-04-22 | Stop reason: HOSPADM

## 2021-04-22 RX ORDER — SODIUM CHLORIDE, SODIUM LACTATE, POTASSIUM CHLORIDE, CALCIUM CHLORIDE 600; 310; 30; 20 MG/100ML; MG/100ML; MG/100ML; MG/100ML
INJECTION, SOLUTION INTRAVENOUS CONTINUOUS
Status: DISCONTINUED | OUTPATIENT
Start: 2021-04-22 | End: 2021-04-22 | Stop reason: HOSPADM

## 2021-04-22 RX ORDER — ONDANSETRON 2 MG/ML
4 INJECTION INTRAMUSCULAR; INTRAVENOUS EVERY 4 HOURS PRN
Status: DISCONTINUED | OUTPATIENT
Start: 2021-04-22 | End: 2021-04-22 | Stop reason: HOSPADM

## 2021-04-22 RX ORDER — LIDOCAINE HYDROCHLORIDE 10 MG/ML
INJECTION, SOLUTION EPIDURAL; INFILTRATION; INTRACAUDAL; PERINEURAL PRN
Status: DISCONTINUED | OUTPATIENT
Start: 2021-04-22 | End: 2021-04-22 | Stop reason: SDUPTHER

## 2021-04-22 RX ORDER — ROCURONIUM BROMIDE 10 MG/ML
INJECTION, SOLUTION INTRAVENOUS PRN
Status: DISCONTINUED | OUTPATIENT
Start: 2021-04-22 | End: 2021-04-22 | Stop reason: SDUPTHER

## 2021-04-22 RX ORDER — NEOSTIGMINE METHYLSULFATE 5 MG/5 ML
SYRINGE (ML) INTRAVENOUS PRN
Status: DISCONTINUED | OUTPATIENT
Start: 2021-04-22 | End: 2021-04-22 | Stop reason: SDUPTHER

## 2021-04-22 RX ORDER — FENTANYL CITRATE 50 UG/ML
INJECTION, SOLUTION INTRAMUSCULAR; INTRAVENOUS
Status: COMPLETED
Start: 2021-04-22 | End: 2021-04-22

## 2021-04-22 RX ORDER — BUPIVACAINE HYDROCHLORIDE 2.5 MG/ML
INJECTION, SOLUTION EPIDURAL; INFILTRATION; INTRACAUDAL
Status: COMPLETED
Start: 2021-04-22 | End: 2021-04-22

## 2021-04-22 RX ORDER — ONDANSETRON 2 MG/ML
INJECTION INTRAMUSCULAR; INTRAVENOUS
Status: COMPLETED
Start: 2021-04-22 | End: 2021-04-22

## 2021-04-22 RX ORDER — SCOLOPAMINE TRANSDERMAL SYSTEM 1 MG/1
PATCH, EXTENDED RELEASE TRANSDERMAL
Status: COMPLETED
Start: 2021-04-22 | End: 2021-04-22

## 2021-04-22 RX ORDER — ONDANSETRON 4 MG/1
4 TABLET, FILM COATED ORAL EVERY 6 HOURS PRN
Qty: 20 TABLET | Refills: 0 | Status: SHIPPED | OUTPATIENT
Start: 2021-04-22

## 2021-04-22 RX ORDER — PROPOFOL 10 MG/ML
INJECTION, EMULSION INTRAVENOUS PRN
Status: DISCONTINUED | OUTPATIENT
Start: 2021-04-22 | End: 2021-04-22 | Stop reason: SDUPTHER

## 2021-04-22 RX ORDER — SCOLOPAMINE TRANSDERMAL SYSTEM 1 MG/1
1 PATCH, EXTENDED RELEASE TRANSDERMAL ONCE
Status: CANCELLED | OUTPATIENT
Start: 2021-04-22 | End: 2021-04-22

## 2021-04-22 RX ORDER — ONDANSETRON 2 MG/ML
INJECTION INTRAMUSCULAR; INTRAVENOUS PRN
Status: DISCONTINUED | OUTPATIENT
Start: 2021-04-22 | End: 2021-04-22 | Stop reason: SDUPTHER

## 2021-04-22 RX ORDER — OXYCODONE HYDROCHLORIDE AND ACETAMINOPHEN 5; 325 MG/1; MG/1
1 TABLET ORAL PRN
Status: DISCONTINUED | OUTPATIENT
Start: 2021-04-22 | End: 2021-04-22 | Stop reason: HOSPADM

## 2021-04-22 RX ORDER — BUPIVACAINE HYDROCHLORIDE 2.5 MG/ML
INJECTION, SOLUTION EPIDURAL; INFILTRATION; INTRACAUDAL
Status: COMPLETED | OUTPATIENT
Start: 2021-04-22 | End: 2021-04-22

## 2021-04-22 RX ORDER — ONDANSETRON 2 MG/ML
4 INJECTION INTRAMUSCULAR; INTRAVENOUS
Status: DISCONTINUED | OUTPATIENT
Start: 2021-04-22 | End: 2021-04-22 | Stop reason: HOSPADM

## 2021-04-22 RX ORDER — FENTANYL CITRATE 50 UG/ML
100 INJECTION, SOLUTION INTRAMUSCULAR; INTRAVENOUS ONCE
Status: CANCELLED | OUTPATIENT
Start: 2021-04-22 | End: 2021-04-22

## 2021-04-22 RX ORDER — ACETAMINOPHEN AND CODEINE PHOSPHATE 300; 30 MG/1; MG/1
1 TABLET ORAL EVERY 4 HOURS PRN
Qty: 15 TABLET | Refills: 0 | Status: SHIPPED | OUTPATIENT
Start: 2021-04-22 | End: 2021-04-29

## 2021-04-22 RX ORDER — HYDROCODONE BITARTRATE AND ACETAMINOPHEN 5; 325 MG/1; MG/1
1 TABLET ORAL PRN
Status: DISCONTINUED | OUTPATIENT
Start: 2021-04-22 | End: 2021-04-22

## 2021-04-22 RX ORDER — ACETAMINOPHEN AND CODEINE PHOSPHATE 300; 30 MG/1; MG/1
1 TABLET ORAL EVERY 4 HOURS PRN
Status: DISCONTINUED | OUTPATIENT
Start: 2021-04-22 | End: 2021-04-22 | Stop reason: HOSPADM

## 2021-04-22 RX ORDER — PROMETHAZINE HYDROCHLORIDE 25 MG/ML
25 INJECTION, SOLUTION INTRAMUSCULAR; INTRAVENOUS EVERY 4 HOURS PRN
Status: DISCONTINUED | OUTPATIENT
Start: 2021-04-22 | End: 2021-04-22 | Stop reason: HOSPADM

## 2021-04-22 RX ORDER — MEPERIDINE HYDROCHLORIDE 50 MG/ML
12.5 INJECTION INTRAMUSCULAR; INTRAVENOUS; SUBCUTANEOUS EVERY 5 MIN PRN
Status: DISCONTINUED | OUTPATIENT
Start: 2021-04-22 | End: 2021-04-22 | Stop reason: HOSPADM

## 2021-04-22 RX ADMIN — ROCURONIUM BROMIDE 30 MG: 10 INJECTION INTRAVENOUS at 08:03

## 2021-04-22 RX ADMIN — ONDANSETRON 4 MG: 2 INJECTION INTRAMUSCULAR; INTRAVENOUS at 09:59

## 2021-04-22 RX ADMIN — BUPIVACAINE 20 ML: 13.3 INJECTION, SUSPENSION, LIPOSOMAL INFILTRATION at 07:56

## 2021-04-22 RX ADMIN — LIDOCAINE HYDROCHLORIDE 20 MG: 10 INJECTION, SOLUTION EPIDURAL; INFILTRATION; INTRACAUDAL; PERINEURAL at 08:03

## 2021-04-22 RX ADMIN — FENTANYL CITRATE 100 MCG: 50 INJECTION, SOLUTION INTRAMUSCULAR; INTRAVENOUS at 07:49

## 2021-04-22 RX ADMIN — FAMOTIDINE 20 MG: 10 INJECTION, SOLUTION INTRAVENOUS at 07:47

## 2021-04-22 RX ADMIN — PROPOFOL 120 MG: 10 INJECTION, EMULSION INTRAVENOUS at 08:03

## 2021-04-22 RX ADMIN — MIDAZOLAM HYDROCHLORIDE 2 MG: 1 INJECTION, SOLUTION INTRAMUSCULAR; INTRAVENOUS at 07:49

## 2021-04-22 RX ADMIN — ONDANSETRON 4 MG: 2 INJECTION INTRAMUSCULAR; INTRAVENOUS at 09:01

## 2021-04-22 RX ADMIN — APREPITANT 40 MG: 40 CAPSULE ORAL at 07:47

## 2021-04-22 RX ADMIN — FENTANYL CITRATE 100 MCG: 50 INJECTION, SOLUTION INTRAMUSCULAR; INTRAVENOUS at 08:03

## 2021-04-22 RX ADMIN — Medication 2 MG: at 09:03

## 2021-04-22 RX ADMIN — MIDAZOLAM HYDROCHLORIDE 2 MG: 1 INJECTION, SOLUTION INTRAMUSCULAR; INTRAVENOUS at 08:01

## 2021-04-22 RX ADMIN — DEXAMETHASONE SODIUM PHOSPHATE 5 MG: 10 INJECTION, SOLUTION INTRAMUSCULAR; INTRAVENOUS at 08:10

## 2021-04-22 RX ADMIN — CEFAZOLIN SODIUM 2000 MG: 2 SOLUTION INTRAVENOUS at 08:16

## 2021-04-22 RX ADMIN — SODIUM CHLORIDE, POTASSIUM CHLORIDE, SODIUM LACTATE AND CALCIUM CHLORIDE: 600; 310; 30; 20 INJECTION, SOLUTION INTRAVENOUS at 08:56

## 2021-04-22 RX ADMIN — SODIUM CHLORIDE, POTASSIUM CHLORIDE, SODIUM LACTATE AND CALCIUM CHLORIDE: 600; 310; 30; 20 INJECTION, SOLUTION INTRAVENOUS at 08:01

## 2021-04-22 RX ADMIN — Medication 0.4 MG: at 09:03

## 2021-04-22 RX ADMIN — BUPIVACAINE HYDROCHLORIDE 60 ML: 2.5 INJECTION, SOLUTION EPIDURAL; INFILTRATION; INTRACAUDAL; PERINEURAL at 07:56

## 2021-04-22 RX ADMIN — KETOROLAC TROMETHAMINE 15 MG: 30 INJECTION, SOLUTION INTRAMUSCULAR at 09:03

## 2021-04-22 ASSESSMENT — PULMONARY FUNCTION TESTS
PIF_VALUE: 18
PIF_VALUE: 17
PIF_VALUE: 2
PIF_VALUE: 18
PIF_VALUE: 13
PIF_VALUE: 18
PIF_VALUE: 19
PIF_VALUE: 19
PIF_VALUE: 16
PIF_VALUE: 15
PIF_VALUE: 20
PIF_VALUE: 13
PIF_VALUE: 18
PIF_VALUE: 14
PIF_VALUE: 17
PIF_VALUE: 17
PIF_VALUE: 18
PIF_VALUE: 13
PIF_VALUE: 17
PIF_VALUE: 18
PIF_VALUE: 14
PIF_VALUE: 18
PIF_VALUE: 18
PIF_VALUE: 19
PIF_VALUE: 17
PIF_VALUE: 14
PIF_VALUE: 18
PIF_VALUE: 17
PIF_VALUE: 3
PIF_VALUE: 19
PIF_VALUE: 0
PIF_VALUE: 14
PIF_VALUE: 2
PIF_VALUE: 17
PIF_VALUE: 14
PIF_VALUE: 16
PIF_VALUE: 14
PIF_VALUE: 3
PIF_VALUE: 17
PIF_VALUE: 18

## 2021-04-22 ASSESSMENT — PAIN SCALES - GENERAL: PAINLEVEL_OUTOF10: 0

## 2021-04-22 NOTE — H&P
[address]      Patient's Name/ Date of Birth/ Gender: Lucina Ugalde / 1990 (31 y.o.) / female     Attending physician: Veronica Knowles DO    CC: biliary dyskinesia    History of present Illness: Lucina Ugalde is a 31 y.o. female, presents today for cholecystectomy. Past Medical History:  has a past medical history of Allergic rhinitis, Fibromyalgia, Headache, Neurogenic syncope, and Tachycardia. Past Surgical History:   Past Surgical History:   Procedure Laterality Date    TOTAL HIP ARTHROPLASTY  2013    surgical hip dislocation       Social History:  reports that she has never smoked. She has never used smokeless tobacco. She reports current alcohol use. She reports that she does not use drugs. Family History: family history includes Asthma in her mother; Heart Disease in her father; High Blood Pressure in her father. Review of Systems:   General: Denies fever, chills, night sweats, weight loss, malaise, fatigue  HEENT: Denies sore throat, sinus problems, allergic rhinosinusitis  Card: Denies chest pain, palpitations, orthopnea/PND. Denies h/o murmurs  Pulm: Denies cough, shortness of breath, HERRERA  GI:  RUQ pain. Denies history of constipation, diarrhea, hematochezia or melena  : Denies polyuria, dysuria, hematuria  Endo: Denies diabetes, thyroid problems. Heme: Denies anemia, h/o bleeding or clotting problems. Neuro: Denies h/o CVA, TIA  Skin: Denies rashes, ulcers  Musculoskeletal: Denies muscle, joint, back pain.     Allergies: Indocin [indomethacin] and Gabapentin    Current Meds:  Current Facility-Administered Medications:     0.9 % sodium chloride infusion, , Intravenous, Continuous, Abi Salazar MD    lactated ringers infusion, , Intravenous, Continuous, Abi Salazar MD    sodium chloride flush 0.9 % injection 10 mL, 10 mL, Intravenous, 2 times per day, Abi Salazar MD    sodium chloride flush 0.9 % injection 10 mL, 10 mL, Intravenous, PRN, Gricel Fitzpatrick MD    0.9 % sodium chloride infusion, 25 mL, Intravenous, PRN, Gricel Fitzpatrick MD    sodium chloride (PF) 0.9 % injection, , , ,     bupivacaine (PF) (MARCAINE) 0.25 % injection, , , ,     ceFAZolin (ANCEF) IVPB, , , ,     bupivacaine liposome (EXPAREL) 1.3 % injection, , , ,     Vital Signs:  Vitals:    04/22/21 0657   BP: 121/79   Pulse: 93   Resp: 21   Temp: 97.7 °F (36.5 °C)   SpO2: 100%       Physical Exam:  Vital signs and Nurse's note reviewed  Gen:  A&Ox3, NAD  HEENT: NCAT, PERRLA, EOMI, no scleral icterus, oral mucosa moist  Neck: Trachea midline without obvious masses or lesions  Chest: Symmetric rise with inhalation, no evidence of trauma  CVS: S1S2  Resp: Good bilateral air entry, no audible wheeze or rhonchi  Abd: soft, non-tender, non-distended, no hepatosplenomegaly or palpable masses  Ext: No clubbing, cyanosis, edema, peripheral pulses 2+ Rad/Fem/DP/PT  CNS: Moves all extremities, no gross focal motor deficits  Skin: No erythema or ulcerations   Imaging:  Nm Hepatobiliary Scan W Ejection Fraction    Result Date: 3/23/2021  EXAMINATION: NUCLEAR MEDICINE HEPATOBILIARY SCINTIGRAPHY (HIDA SCAN). 3/23/2021 10:00 am TECHNIQUE: Approximately 4.8 [id number] Mebrofenin (Choletec) was administered IV. Then, dynamic images of the abdomen were obtained in the anterior projection for 60 mins. A right lateral view was also obtained at 60 mins. 1 mcg Kinevac was given for gallbladder ejection fraction. HISTORY: ORDERING SYSTEM PROVIDED HISTORY: Gastroesophageal reflux disease, unspecified whether esophagitis present TECHNOLOGIST PROVIDED HISTORY: Suspected GB dyskinesia Is the patient pregnant?->No Reason for Exam: GERD, Suspected GB Dyskinesia Acuity: Unknown Type of Exam: Unknown FINDINGS: Prompt, homogenous uptake by the liver is noted with normal appearance of radiotracer excretion into the biliary system. Clearance of bloodpool activity appears appropriate.  Gallbladder and small bowel is

## 2021-04-22 NOTE — ANESTHESIA POSTPROCEDURE EVALUATION
Department of Anesthesiology  Postprocedure Note    Patient: Ramon Houser  MRN: [[id number]]  YOB: 1990  Date of evaluation: 4/22/2021  Time:  9:38 AM     Procedure Summary     Date: 04/22/21 Room / Location: [address] [place] [address] / [facility]    Anesthesia Start: 0800 Anesthesia Stop: 4408    Procedure: CHOLECYSTECTOMY LAPAROSCOPIC ROBOTIC (N/A ) Diagnosis: (BILIARY DYSKINSIA)    Surgeons: Ricardo Zazueta DO Responsible Provider: Edi Pemberton MD    Anesthesia Type: general, regional ASA Status: 2          Anesthesia Type: general, regional    Arcadio Phase I: Arcadio Score: 7    Arcadio Phase II:      Last vitals: Reviewed and per EMR flowsheets.        Anesthesia Post Evaluation    Patient location during evaluation: PACU  Patient participation: complete - patient participated  Level of consciousness: awake and alert  Airway patency: patent  Nausea & Vomiting: no nausea and no vomiting  Complications: no  Cardiovascular status: hemodynamically stable  Respiratory status: spontaneous ventilation and face mask  Hydration status: euvolemic

## 2021-04-22 NOTE — ANESTHESIA PRE PROCEDURE
10 mL  10 mL Intravenous PRN Maia Martinez MD        0.9 % sodium chloride infusion  25 mL Intravenous PRN Maia Martinez MD        sodium chloride (PF) 0.9 % injection             bupivacaine (PF) (MARCAINE) 0.25 % injection             ceFAZolin (ANCEF) IVPB             bupivacaine liposome (EXPAREL) 1.3 % injection             morphine (PF) injection 2 mg  2 mg Intravenous Q30 Min PRN Himanshu Block, DO        fentaNYL (SUBLIMAZE) injection 25 mcg  25 mcg Intravenous Q30 Min PRN Himanshu Block, DO        morphine injection 4 mg  4 mg Intravenous Q1H PRN Himanshu Block, DO        fentaNYL (SUBLIMAZE) injection 50 mcg  50 mcg Intravenous Q1H PRN Himanshu Block, DO        HYDROcodone-acetaminophen [facility][1]) 5-325 MG per tablet 1 tablet  1 tablet Oral PRN Himanshu Block, DO        Or    HYDROcodone-acetaminophen [facility][1]) 5-325 MG per tablet 2 tablet  2 tablet Oral PRN Himanshu Block, DO        ondansetron [facility]) injection 4 mg  4 mg Intravenous Q4H PRN Himanshu Block, DO        promethazine (PHENERGAN) injection 25 mg  25 mg Intravenous Q4H PRN Himanshu Block, DO        diphenhydrAMINE (BENADRYL) injection 12.5 mg  12.5 mg Intravenous Once PRN Himanshu Block, DO        midazolam (VERSED) 2 MG/2ML injection             fentaNYL (SUBLIMAZE) 100 MCG/2ML injection                Allergies:     Allergies   Allergen Reactions    Indocin [Indomethacin] Nausea And Vomiting    Gabapentin      fever       Problem List:    Patient Active Problem List   Diagnosis Code    Acne vulgaris L70.0    Tachycardia R00.0    Migraine without aura and without status migrainosus, not intractable G43.009    Episodic lightheadedness R42    Irritable bowel syndrome with diarrhea K58.0    Fibromyalgia M79.7    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    Hip pain, left M25.552    Migraine headache G43.909    Palpitations R00.2    Vasovagal syncope R55    Fever of unknown origin (FUO) R50.9    Nausea R11.0    Biliary dyskinesia K82.8       Past Medical History:        Diagnosis Date    Allergic rhinitis     Fibromyalgia     Headache     Neurogenic syncope     Tachycardia        Past Surgical History:        Procedure Laterality Date    TOTAL HIP ARTHROPLASTY  2013    surgical hip dislocation       Social History:    Social History     Tobacco Use    Smoking status: Never Smoker    Smokeless tobacco: Never Used   Substance Use Topics    Alcohol use: Yes     Comment: social                                Counseling given: Not Answered      Vital Signs (Current):   Vitals:    04/22/21 0657   BP: 121/79   Pulse: 93   Resp: 21   Temp: 97.7 °F (36.5 °C)   TempSrc: Temporal   SpO2: 100%   Weight: 108 lb 4.8 oz (49.1 kg)   Height: 5' 3\" (1.6 m)                                              BP Readings from Last 3 Encounters:   04/22/21 121/79   04/01/21 112/76   03/30/21 121/77       NPO Status: Time of last liquid consumption: 2100                        Time of last solid consumption: 1800                        Date of last liquid consumption: 04/21/21                        Date of last solid food consumption: 04/21/21    BMI:   Wt Readings from Last 3 Encounters:   04/22/21 108 lb 4.8 oz (49.1 kg)   04/01/21 107 lb (48.5 kg)   03/30/21 109 lb 3.2 oz (49.5 kg)     Body mass index is 19.18 kg/m².     CBC:   Lab Results   Component Value Date    WBC 5.6 04/12/2021    WBC 5.6 04/12/2021    RBC 4.54 04/12/2021    HGB 13.2 04/12/2021    HCT 39.4 04/12/2021    MCV 86.8 04/12/2021    RDW 13.3 04/12/2021     04/12/2021       CMP:   Lab Results   Component Value Date     03/30/2021    K 4.5 03/30/2021     03/30/2021    CO2 25 03/30/2021    BUN 10 03/30/2021    CREATININE 0.59 03/30/2021    GFRAA >60 03/30/2021    LABGLOM >60 03/30/2021    GLUCOSE 81 03/30/2021    PROT 7.6 03/30/2021    CALCIUM 9.2 03/30/2021    BILITOT 0.16 03/30/2021    ALKPHOS 62 03/30/2021    AST 15 03/30/2021    ALT 9 03/30/2021       POC Tests: No results for input(s): POCGLU, Gurvinder Nuñez, POCCL, POCBUN, POCHEMO, POCHCT in the last 72 hours. Coags: No results found for: PROTIME, INR, APTT    HCG (If Applicable):   Lab Results   Component Value Date    HCG NEGATIVE 04/22/2021        ABGs: No results found for: PHART, PO2ART, ZOI5DQT, SYX6DUF, BEART, O9SYIASG     Type & Screen (If Applicable):  No results found for: LABABO, LABRH    Drug/Infectious Status (If Applicable):  No results found for: HIV, HEPCAB    COVID-19 Screening (If Applicable):   Lab Results   Component Value Date    COVID19 Not Detected 04/18/2021    COVID19 Not Detected 11/12/2020           Anesthesia Evaluation  Patient summary reviewed and Nursing notes reviewed   history of anesthetic complications: PONV. Airway: Mallampati: I  TM distance: >3 FB   Neck ROM: full  Mouth opening: > = 3 FB Dental: normal exam         Pulmonary:Negative Pulmonary ROS and normal exam                               Cardiovascular:                   ROS comment: -DIZZINESS     Neuro/Psych:                ROS comment: -SYNCOPE - NO ISSUES IN YEARS GI/Hepatic/Renal:   (+) GERD:,          ROS comment: -NAUSEA VOMITING. Endo/Other: Negative Endo/Other ROS                     ROS comment: -NPO AFTER MIDNIGHT  -ALLERGIES - INDOCIN, GABAPENTIN Abdominal:           Vascular: negative vascular ROS. Anesthesia Plan      general and regional     ASA 2     (GETA, TAP BLOCK)  Induction: intravenous. MIPS: Postoperative opioids intended and Prophylactic antiemetics administered. Anesthetic plan and risks discussed with patient. Plan discussed with CRNA.     Attending anesthesiologist reviewed and agrees with Pre Eval content              Davin Villalta MD   4/22/2021

## 2021-04-22 NOTE — ANESTHESIA PROCEDURE NOTES
Peripheral Block    Patient location during procedure: pre-op  Start time: 4/22/2021 7:51 AM  End time: 4/22/2021 7:56 AM  Staffing  Performed: anesthesiologist   Anesthesiologist: Itzel Treviño MD  Preanesthetic Checklist  Completed: patient identified, IV checked, site marked, risks and benefits discussed, surgical consent, monitors and equipment checked, pre-op evaluation, timeout performed, anesthesia consent given, oxygen available and patient being monitored  Peripheral Block  Patient position: supine  Prep: ChloraPrep  Patient monitoring: continuous pulse ox, cardiac monitor and frequent blood pressure checks  Block type: TAP  Laterality: bilateral  Injection technique: single-shot  Guidance: ultrasound guided  Provider prep: mask and sterile gloves  Needle  Needle type: combined needle/nerve stimulator   Needle gauge: 20 G  Needle length: 4 IN.   Needle localization: anatomical landmarks and ultrasound guidance  Assessment  Injection assessment: negative aspiration for heme, no paresthesia on injection and local visualized surrounding nerve on ultrasound  Paresthesia pain: none  Slow fractionated injection: yes  Hemodynamics: stable  Medications Administered  Bupivacaine liposome (EXPAREL) injection 1.3%, 20 mL  bupivacaine (MARCAINE) PF injection 0.25%, 60 mL  Reason for block: post-op pain management and at surgeon's request

## 2021-04-22 NOTE — PROGRESS NOTES
CLINICAL PHARMACY NOTE: MEDS TO [address] [place] [address] Drive Select Patient?: No  Total # of Prescriptions Filled: 3   The following medications were delivered to the patient:  · Dok 100mg  · Ondansetron 4mg  · Tylenol #3  Total # of Interventions Completed: 0  Time Spent (min): 0    Additional Documentation:

## 2021-04-22 NOTE — OP NOTE
Operative Note      Patient: Lucina Ugalde  YOB: 1990  MRN: [id number]    Date of Procedure: 4/22/2021    Pre-Op Diagnosis: BILIARY DYSKINSIA    Post-Op Diagnosis: Same       Procedure(s):  CHOLECYSTECTOMY LAPAROSCOPIC ROBOTIC    Surgeon(s):  Veronica Knowles DO    Assistant:   * No surgical staff found *    Anesthesia: General    Estimated Blood Loss (mL): Minimal    Complications: None    Specimens:   ID Type Source Tests Collected by Time Destination   A : GALLBLADDER AND CONTENTS  Tissue Gallbladder SURGICAL PATHOLOGY Veronica Knowles DO 4/22/2021 1579        Implants:  * No implants in log *      Drains: * No LDAs found *    Findings: as above. Wound class 2    Detailed Description of Procedure:         HISTORY: The patient is a 31y.o. year old female with history of above preop diagnosis. The risk, benefits, expected outcome, and alternatives to the procedure were explained to the patient's understanding and written informed consent was obtained. DESCRIPTION OF PROCEDURE:  Patient was brought to the operating suite and placed on the operating table in supine position. Timeout was performed verifying correct patient, position, equipment and procedure to be performed. EPC cuffs were applied and preoperative antibiotics were infused. TAP block was performed preoperatively. General anesthesia administered and the patient was endotracheally intubated. The patient's abdomen was prepped and draped in the usual sterile fashion. Scalpel was used to make a transverse incision 1cm at Avalos's point, 8mm Optiview trocar was used to gain entry into the abdomen under direct visualization, no injury to the underlying structures were seen. 1cm transverse incisions were then made below the umbilicus and two additional incisions in the RLQ at the midclavicular and anterior axillary lines. 8mm robot ports were placed under direct visualization at these sites. Pneumoperitoneum established with 15mmHg CO2.     The patient was then positioned in reverse Trendelenburg with rotation the patient's left, then the robot was docked in the usual fashion. The gallbladder appeared distended. Atraumatic graspers were used to grasp and elevate the fundus and infundibulum. The peritoneum between the gallbladder and liver were taken down with electrocautery on the medial and lateral aspects. The cystic duct and artery were skeletonized until the critical view was achieved. The cystic duct was clipped twice proximally and once distally with Hemalok clips. The cystic artery was clipped once proximally and once distally with Hemalok clips. The duct and artery were then transected with cautery. The gallbladder was dissected from the gallbladder fossa with electrocautery, then placed into an laparoscopic specimen bag which was removed from the abdomen through the LUQ incision. On inspection of the area of dissection, hemostasis was excellent, no leakage of bile was noted, clips were intact. Laparoscopic suction  was inserted into the abdomen and the gallbladder fossa and perihepatic regions were irrigated and drained until the effluent was clear. The working ports were removed under direct visualization, the camera and camera port were then removed and the abdomen dessuflated. The port sites were then closed with subcuticular sutures of 4-0 monocryl then covered with skin glue. The patient tolerated the procedure well without complications, all sponge needle and instrument counts were correct at the end of the case. The patient was successfully extubated and taken to PACU in stable condition.         Electronically signed by Cristel Albright DO on 4/22/2021 at 9:11 AM

## 2021-04-23 LAB — SURGICAL PATHOLOGY REPORT: NORMAL

## 2021-05-03 NOTE — PROGRESS NOTES
[facility] - Office Note  Today's Date and Time: 5/3/2021, 3:58 PM    Diagnostic Impression :     1. Eosinopenia    2. Eosinophilic leukocytosis, unspecified type    3. Nausea    4. Migraine without aura and without status migrainosus, not intractable    5. Irritable bowel syndrome with diarrhea    6. Fibromyalgia    7. Fever of unknown origin (FUO)    8. Femoral acetabular impingement    9. Biliary dyskinesia    10. Acne vulgaris    11. Vasovagal syncope    12. Tachycardia    13. POTS (postural orthostatic tachycardia syndrome)        Recommendations     Chief Complaint   Patient presents with    Fever       History of Present Illness:   Jeff Rankin is a 31y.o.-year-old  female who was initially evaluated on 5/4/2021. Patient seen at the request of KEITH Evans. INITIAL HISTORY:  Patient was initially seen in the office on 1/28/2021. She indicates that she has a past history of fibromyalgia which was diagnosed about 5 to 6 years ago at the [address] [place] [address] by a rheumatologist.  He apparently had run a number of autoimmune tests which were negative and on the basis of the physical examination, with the presence of multiple trigger points, made the diagnosis of fibromyalgia. At that time the patient was also complaining of symptoms of fatigue, migraines, brain fog and tachycardia. She was treated with gabapentin 300 mg 3 times daily which does help with the symptoms of fibromyalgia. She indicates she still has some hypersensitivity of the skin to touch and pressure but has not had the degree of discomfort she had previously at the trigger points. She indicates that she has had the symptoms of fatigue for at least 10 years. She dates the onset to a previous left hip reconstructive surgery for congenital hip dysplasia. Following that particular surgery she has continued to struggle with the fatigue.     She also was found to have intermittent bouts of tachycardia and palpitations. She was diagnosed with POTS syndrome. He has been on treatment with metoprolol which has helped her symptoms quite a bit. Denies any recent episodes of syncope or presyncope. She reports that occasionally she will have some dizziness when getting up. Additionally the patient has suffered from migraines. She has received different modalities of treatment including the injection of botox on 2 different occasions. She denies any previous fevers related to her fibromyalgia or other diagnoses. She indicates that she first noticed the fever about 4 months ago. Her father was hospitalized in September 2020 for a hand infection. She visited him and then became aware of the fevers. The temperatures varied from the 99 degree F range to the 100.8 F range. She has kept a diary of the fevers and the associated symptoms. She indicates that when the fevers occur she has also noticed an increase in fatigue and the lack of energy. She has also noticed that her migraines have become daily. She has had periodic chest pain with a sensation of tightness on the chest and increased heart rate. The chest tightness bother her enough that she went to [facility] and had a chest x-ray which apparently was normal.  He also reports that that she developed nasal congestion. She indicates that she has seasonal allergies and in the spring and summer months she has that sensation but not in the wintertime. Most of her seasonal allergies related to trees. As a consequence she has been having to take Zyrtec every evening. The patient also required related the development of muscle soreness and twitching and left-sided kidney pain. She had 2 urine analysis and cultures which did not show any urinary tract infection. The patient has had a variety of laboratory tests which have been done to try to sort out the cause of the fever.   Including Lyme's disease and Natalie-Lind virus both of which were negative. On physical examination she has mild tenderness on pressure of the trigger points on the back. She has some mild discomfort in the right upper quadrant but indicates that this has been present for a long period of time. There is some discomfort in the left sacroiliac joint area. Otherwise the examination was normal with no adenopathy or masses found. Evaluation on 2/25/2021: On assessment, the patient was alert and oriented. She stated that she has been feeling much better since stopping Neurontin and not receiving Botox. She said that her last fever was 99.7 °F on Thursday, February 18, 2021. She has been having a migraine headache almost daily as well as severe nausea with some episodes of emesis. She stated there was no blood or coffee-ground appearance noted. Lab work for CBC with differential, ESR, CRP, ROBI with reflex testing, mycoplasma antibody, HIV, and CK were all within defined limits. Her eosinophil count was low at 20. Referral to Dr. Aiden Parker with rheumatology was placed. Order for Neurontin placed-CBC with differential to be completed prior to resumption of Neurontin and 7 to 10 days afterwards to look for signs of inflammatory response. 3/2/21: The patient took one dose of neurontin and her fevers came back. She was directed to stop taking it which she had already done. Office visit 3/11/21:    The patient has continued having fevers even though she has not taken any more gabapentin since the beginning of the month. Provider had a lengthy conversation with the patient regarding her illnesses including daily migraines, fever of unknown origin,chronic fatigue, fibromyalgia, and new onset night terrors. The patient is a  and has had difficulty concentrating and being productive with the increased stress associated with her symptoms.   Due to the stress and anxiety the patietn is experiencing at this time, I have determined that it is in the her best interest to take 2 weeks off of work until at least her next appointment when she can be reevaluated. Rest and recuperation will hopefully help with some of her symptoms. Office visit 4/1/21:    Patient has been feeling a bit better since the last visit, and was afebrile for a few days until her low-grade fevers resumed about 5 days ago. She saw Dr. Sal Pacheco with [facility] for her right lower abdominal pain and underwent a HIDA scan which revealed biliary dyskinesia. The patient has been scheduled for a cholecystectomy on April 22, 2021 with Dr. Tarun Long at [facility]. It is hopeful that once her gallbladder is removed she will see some resolution to her fevers if there is some low-grade chronic inflammation associated with the GB dyskinesia. She has been following the Fodmap diet which has helped decrease the amount and severity of her migraines. She has follow-up visits scheduled with Dr. Michael Mena with rheumatology and her neurologist.     A repeat eosinophil lab test has been ordered to compare with her elevated results while she was on gabapentin. She will follow-up in one months time after she undergoes her gallbladder surgery. CURRENT EVALUATION 5/4/21    Bo Bacon underwent the scheduled cholecystectomy on 4/23/21 per Dr. Tarun Long. She also received her second Calixto Peter Covid vaccine on 4/29/21 and reports that other than a low grade fever the day after her vaccine, she has not experienced another fever since having her gallbladder removed. She has experienced one migraine since the procedure still experiences some fatigue, but overall she feels much better. Her rheumatologist, Dr. Michael Mena, has started her on Lyrica for the fibromyalgia. She is scheduled to return to work tomorrow. On examination she looks much better.   No new issues were identified      Surgical Pathology report findings from cholecystectomy on 4/22/21  Gallbladder:       Mild chronic inflammation. Benign cystic duct lymph node. No evidence of cancer      Eosinophils Absolute 78Low   200 - 400 /uL Final 04/12/2021 12:19 PM         DISCUSSION:  · Patient evaluated for fever of unknown origin  · She had a cholecystectomy with removal of chronically inflamed gallbladder  · The fevers have subsided since then, just deemed that they were caused by the chronic inflammation of the gallbladder. · No additional studies or treatment modalities contemplated from our perspective. PLAN:  · The patient was educated to follow-up if she begins experiencing fevers again. I have personally reviewed the past medical history, past surgical history, medications, social history, and family history, and I have updated the database accordingly. Past Medical History:     Past Medical History:   Diagnosis Date    Allergic rhinitis     Fibromyalgia     Headache     Neurogenic syncope     Tachycardia        Past Surgical  History:     Past Surgical History:   Procedure Laterality Date    CHOLECYSTECTOMY, LAPAROSCOPIC  04/22/2021    CHOLECYSTECTOMY LAPAROSCOPIC ROBOTIC    CHOLECYSTECTOMY, LAPAROSCOPIC N/A 4/22/2021    CHOLECYSTECTOMY LAPAROSCOPIC ROBOTIC performed by Scott Pereyra DO at [address] [place] [address]  2013    surgical hip dislocation       Medications:     Current Outpatient Medications:     ondansetron (ZOFRAN) 4 MG tablet, Take 1 tablet by mouth every 6 hours as needed for Nausea or Vomiting, Disp: 20 tablet, Rfl: 0    docusate sodium (COLACE) 100 MG capsule, Take 1 capsule by mouth 2 times daily, Disp: 30 capsule, Rfl: 0    metoprolol tartrate (LOPRESSOR) 25 MG tablet, Take 0.5 tablets by mouth 2 times daily, Disp: 30 tablet, Rfl: 5    Rimegepant Sulfate (NURTEC) 75 MG TBDP, Take 1 tablet at HA onset, no more than 4 tabs/wk. , Disp: 8 tablet, Rfl: 0    omeprazole (PRILOSEC) 40 MG delayed release capsule, Take 1 capsule by Family History   Problem Relation Age of Onset    Asthma Mother     Heart Disease Father     High Blood Pressure Father         Allergies:   Indocin [indomethacin] and Gabapentin     Review of Systems:   Constitutional: No fevers or chills. Fevers have largely subsided since last visit  Head: Migraines multiple times a week  Eyes: No double vision or blurry vision. ENT: No sore throat or runny nose. . No hearing loss, tinnitus or vertigo. Cardiovascular: No chest pain or palpitations. No shortness of breath. No HERRERA  Lung: No shortness of breath or cough. No sputum production  Abdomen: No nausea, vomiting, diarrhea, or abdominal pain. .  Genitourinary: No increased urinary frequency, or dysuria. No hematuria. No suprapubic or CVA pain  Musculoskeletal: Muscle aches and pains in extremities. No joint effusions, swelling or deformities  Hematologic: No bleeding or bruising. Neurologic: No headache, weakness, numbness, or tingling. Physical Examination :   [facility] 04/09/2021 Comment: urine hCG negative   General Appearance: Awake, alert, and in no apparent distress  Head:  Normocephalic, no trauma  Eyes: Pupils equal, round, reactive, to light and accommodation; extraocular movements intact; sclera anicteric; conjunctivae pink. No embolic phenomena. ENT: Oropharynx clear, without erythema, exudate, or thrush. No tenderness of sinuses. Mouth/throat: mucosa pink and moist. No lesions. Dentition in good repair. Neck:Supple, without lymphadenopathy. Thyroid normal, No bruits. Pulmonary/Chest: Clear to auscultation, without wheezes, rales, or rhonchi. No dullness to percussion. Cardiovascular: Regular rate and rhythm without murmurs, rubs, or gallops. Abdomen: Soft, non tender. Bowel sounds normal. No organomegaly  All four Extremities: No cyanosis, clubbing, edema, or effusions. Neurologic: No gross sensory or motor deficits. Skin: Warm and dry with good turgor. No signs of peripheral arterial or venous insufficiency. Medical Decision Making:   I have independently reviewed/ordered the following labs:    CBC with Differential:  Lab Results   Component Value Date    WBC 5.6 04/12/2021    WBC 5.6 04/12/2021    HGB 13.2 04/12/2021    HGB 13.2 03/30/2021    HCT 39.4 04/12/2021    HCT 40.8 03/30/2021     04/12/2021     03/30/2021    LYMPHOPCT 27 04/12/2021    LYMPHOPCT 23 02/27/2021    LYMPHOPCT 32.4 11/12/2020    MONOPCT 5 04/12/2021    MONOPCT 8 02/27/2021    MONOPCT 4.8 11/12/2020    EOSPCT 0.7 11/12/2020     BMP:   Lab Results   Component Value Date     03/30/2021     11/12/2020    K 4.5 03/30/2021    K 3.7 11/12/2020     03/30/2021     11/12/2020    CO2 25 03/30/2021    CO2 27 11/12/2020    BUN 10 03/30/2021    BUN 7 11/12/2020    CREATININE 0.59 03/30/2021    CREATININE 0.61 11/12/2020     Hepatic Function Panel:  Lab Results   Component Value Date    PROT 7.6 03/30/2021    LABALBU 4.8 03/30/2021    LABALBU 4.9 11/12/2020    BILIDIR <0.08 03/30/2021    IBILI CANNOT BE CALCULATED 03/30/2021    BILITOT 0.16 03/30/2021    BILITOT 0.5 11/12/2020    ALKPHOS 62 03/30/2021    ALKPHOS 71 11/12/2020    ALT 9 03/30/2021    ALT 7 11/12/2020    AST 15 03/30/2021    AST 13 11/12/2020     No results found for: RPR  No results found for: HIV  No results found for: [facility]  Lab Results   Component Value Date    RBC 4.54 04/12/2021    WBC 5.6 04/12/2021    WBC 5.6 04/12/2021     Lab Results   Component Value Date    CREATININE 0.59 03/30/2021    GLUCOSE 81 03/30/2021       Thank you for allowing us to participate in the care of this patient. Please call with questions. Blas Serrano, APRN - CNP     ATTESTATION:    I have discussed the case, including pertinent history and exam findings with the APRN. I have evaluated the  History, physical findings and pictures of the patient and the key elements of the encounter have been performed by me.  I have reviewed the laboratory data, other diagnostic studies and discussed them with the APRN. I have updated the medical record where necessary. I agree with the assessment, plan and orders as documented by the APRN.     Conchis Snow MD.          Pager: [phone] - Office: [phone]

## 2021-05-04 ENCOUNTER — OFFICE VISIT (OUTPATIENT)
Dept: INFECTIOUS DISEASES | Age: 31
End: 2021-05-04
Payer: COMMERCIAL

## 2021-05-04 VITALS
BODY MASS INDEX: 19.01 KG/M2 | WEIGHT: 107.3 LBS | TEMPERATURE: 98 F | SYSTOLIC BLOOD PRESSURE: 109 MMHG | RESPIRATION RATE: 16 BRPM | DIASTOLIC BLOOD PRESSURE: 73 MMHG | HEIGHT: 63 IN | OXYGEN SATURATION: 100 % | HEART RATE: 98 BPM

## 2021-05-04 DIAGNOSIS — G90.A POTS (POSTURAL ORTHOSTATIC TACHYCARDIA SYNDROME): ICD-10-CM

## 2021-05-04 DIAGNOSIS — D70.9 EOSINOPENIA (HCC): Primary | ICD-10-CM

## 2021-05-04 DIAGNOSIS — K82.8 BILIARY DYSKINESIA: ICD-10-CM

## 2021-05-04 DIAGNOSIS — M79.7 FIBROMYALGIA: ICD-10-CM

## 2021-05-04 DIAGNOSIS — R50.9 FUO (FEVER OF UNKNOWN ORIGIN): ICD-10-CM

## 2021-05-04 DIAGNOSIS — L70.0 ACNE VULGARIS: ICD-10-CM

## 2021-05-04 DIAGNOSIS — R00.0 TACHYCARDIA: ICD-10-CM

## 2021-05-04 DIAGNOSIS — G43.009 MIGRAINE WITHOUT AURA AND WITHOUT STATUS MIGRAINOSUS, NOT INTRACTABLE: ICD-10-CM

## 2021-05-04 DIAGNOSIS — R55 VASOVAGAL SYNCOPE: ICD-10-CM

## 2021-05-04 DIAGNOSIS — R11.0 NAUSEA: ICD-10-CM

## 2021-05-04 DIAGNOSIS — M25.859 FEMORAL ACETABULAR IMPINGEMENT: ICD-10-CM

## 2021-05-04 DIAGNOSIS — D72.19 EOSINOPHILIC LEUKOCYTOSIS, UNSPECIFIED TYPE: ICD-10-CM

## 2021-05-04 DIAGNOSIS — R50.9 FEVER OF UNKNOWN ORIGIN (FUO): ICD-10-CM

## 2021-05-04 DIAGNOSIS — K58.0 IRRITABLE BOWEL SYNDROME WITH DIARRHEA: ICD-10-CM

## 2021-05-04 PROCEDURE — 99213 OFFICE O/P EST LOW 20 MIN: CPT | Performed by: INTERNAL MEDICINE

## 2021-05-04 RX ORDER — PREGABALIN 75 MG/1
75 CAPSULE ORAL 2 TIMES DAILY
COMMUNITY
End: 2021-06-30

## 2021-05-10 ENCOUNTER — OFFICE VISIT (OUTPATIENT)
Dept: SURGERY | Age: 31
End: 2021-05-10

## 2021-05-10 VITALS
HEART RATE: 127 BPM | SYSTOLIC BLOOD PRESSURE: 119 MMHG | OXYGEN SATURATION: 99 % | DIASTOLIC BLOOD PRESSURE: 79 MMHG | BODY MASS INDEX: 19.21 KG/M2 | HEIGHT: 63 IN | WEIGHT: 108.4 LBS

## 2021-05-10 DIAGNOSIS — Z90.49 STATUS POST LAPAROSCOPIC CHOLECYSTECTOMY: Primary | ICD-10-CM

## 2021-05-10 PROCEDURE — 99024 POSTOP FOLLOW-UP VISIT: CPT | Performed by: SURGERY

## 2021-05-10 NOTE — PROGRESS NOTES
[facility]  Progress Note    PATIENT NAME: Gloria Ron     CLINIC VISIT DATE: 5/10/2021    SUBJECTIVE:  Gloria Ron is a 31 y.o. female who presents to the clinic today for follow up to robot cholecystectomy performed 4/22/21. No new issues since surgery, pt is tolerating regular diet and having normal BM. Pathology as follows:      Gallbladder:          Mild chronic inflammation. Benign cystic duct lymph node. Pt reports her previous symptoms have resolved    OBJECTIVE:    /79 (Site: Left Upper Arm, Position: Sitting, Cuff Size: Medium Adult)   Pulse 127   Ht 5' 3\" (1.6 m)   Wt 108 lb 6.4 oz (49.2 kg)   SpO2 99%   BMI 19.20 kg/m²     General Appearance:  awake, alert, no acute distress, well developed, well nourished   Skin:  Skin color, texture, turgor normal. No rashes or lesions. Lungs:  Normal chest expansion, unlabored breathing without accessory muscle use. No audible rales, rhonchi, or wheezing. Cardiovascular: S1S2. No evidence of JVD. No evidence of pulsatile masses in abdomen  Abdomen:  Soft, non-tender, no organomegaly, no masses. Incisions C/D/I without evidence of bleeding/infection  Musculoskeletal: No evidence of bony/muscular deformities, trauma, atrophy of either left/right upper/lower extremity. No evidence of digital clubbing or cyanosis. ASSESSMENT:  1. Status post laparoscopic cholecystectomy          PLAN:  1. Lifting restriction to less than 20lbs for the next 4 weeks, unrestricted after this.   2. F/U prn    Electronically signed by Abbi Whelan DO on 5/10/2021 at 10:04 AM

## 2021-05-19 ENCOUNTER — OFFICE VISIT (OUTPATIENT)
Dept: GASTROENTEROLOGY | Age: 31
End: 2021-05-19
Payer: COMMERCIAL

## 2021-05-19 VITALS — DIASTOLIC BLOOD PRESSURE: 85 MMHG | SYSTOLIC BLOOD PRESSURE: 129 MMHG | BODY MASS INDEX: 19.13 KG/M2 | WEIGHT: 108 LBS

## 2021-05-19 DIAGNOSIS — K82.8 BILIARY DYSKINESIA: Primary | ICD-10-CM

## 2021-05-19 PROCEDURE — G8427 DOCREV CUR MEDS BY ELIG CLIN: HCPCS | Performed by: INTERNAL MEDICINE

## 2021-05-19 PROCEDURE — 99213 OFFICE O/P EST LOW 20 MIN: CPT | Performed by: INTERNAL MEDICINE

## 2021-05-19 PROCEDURE — 1036F TOBACCO NON-USER: CPT | Performed by: INTERNAL MEDICINE

## 2021-05-19 PROCEDURE — G8420 CALC BMI NORM PARAMETERS: HCPCS | Performed by: INTERNAL MEDICINE

## 2021-05-19 RX ORDER — OMEPRAZOLE 20 MG/1
20 CAPSULE, DELAYED RELEASE ORAL NIGHTLY
Qty: 30 CAPSULE | Refills: 3 | Status: SHIPPED | OUTPATIENT
Start: 2021-05-19 | End: 2021-07-20

## 2021-05-19 ASSESSMENT — ENCOUNTER SYMPTOMS
CONSTIPATION: 0
BLOOD IN STOOL: 0
EYES NEGATIVE: 1
GASTROINTESTINAL NEGATIVE: 1
ALLERGIC/IMMUNOLOGIC NEGATIVE: 1
RESPIRATORY NEGATIVE: 1
BACK PAIN: 0

## 2021-05-19 NOTE — PROGRESS NOTES
GI FOLLOW UP    INTERVAL HISTORY:     Patient underwent HIDA scan abnormal gallbladder ejection fraction of 25% to suggest that biliary dyskinesia on March 23, 2021-patient underwent laparoscopic cholecystectomy and is noted significant improvement in the patient's upper GI symptoms which include chronic nausea. Chief Complaint   Patient presents with    Follow-up     4 week follow labs and hida scan    Abdominal Pain     Patients previous abdominal pain has subsided since procedure    Diarrhea     States her diarrhea is less frequent since gallbladder removed       1. Biliary dyskinesia          HISTORY OF PRESENT ILLNESS: Sonya Mooney is a 31 y.o. female with a past history remarkable for persistent nausea, daily symptoms, intermittent vomiting, referred for evaluation of chronic GERD and dyspepsia     Mild dyspepsia symptoms. However the symptoms have been subacute to chronic for several years now. Patient reports that her symptoms are often associated with persistent nausea without any episodes of emesis. She seen modest amount of relief with Zofran and Prilosec. She was referred to GI to further evaluate her persistent upper GI symptoms.     Smoker: None   Drinking history: Socially  Illicit drugs: none   Abdominal surgeries: None   Prior Colonoscopy: None   Prior EGD: > 5 yrs ago--- per patient, mild gastritis. FH of GI issues: none     Past Medical,Family, and Social History reviewed and does contribute to the patient presenting condition. Patient's PMH/PSH,SH,PSYCH Hx, MEDs, ALLERGIES, and ROS were all reviewed and updated in the appropriate sections.     PAST MEDICAL HISTORY:  Past Medical History:   Diagnosis Date    Allergic rhinitis     Fibromyalgia     Headache     Neurogenic syncope     Tachycardia        Past Surgical History:   Procedure Laterality Date    CHOLECYSTECTOMY, LAPAROSCOPIC  04/22/2021    CHOLECYSTECTOMY LAPAROSCOPIC ROBOTIC    CHOLECYSTECTOMY, LAPAROSCOPIC N/A 4/22/2021    CHOLECYSTECTOMY LAPAROSCOPIC ROBOTIC performed by Abbi Whelan DO at [address] [place] [address]  2013    surgical hip dislocation       CURRENT MEDICATIONS:    Current Outpatient Medications:     pregabalin (LYRICA) 75 MG capsule, Take 75 mg by mouth 2 times daily. , Disp: , Rfl:     ondansetron (ZOFRAN) 4 MG tablet, Take 1 tablet by mouth every 6 hours as needed for Nausea or Vomiting, Disp: 20 tablet, Rfl: 0    docusate sodium (COLACE) 100 MG capsule, Take 1 capsule by mouth 2 times daily, Disp: 30 capsule, Rfl: 0    metoprolol tartrate (LOPRESSOR) 25 MG tablet, Take 0.5 tablets by mouth 2 times daily, Disp: 30 tablet, Rfl: 5    Rimegepant Sulfate (NURTEC) 75 MG TBDP, Take 1 tablet at HA onset, no more than 4 tabs/wk. , Disp: 8 tablet, Rfl: 0    omeprazole (PRILOSEC) 40 MG delayed release capsule, Take 1 capsule by mouth every morning (before breakfast), Disp: 30 capsule, Rfl: 2    Cyanocobalamin (B-12 PO), Take by mouth daily, Disp: , Rfl:     Cholecalciferol 50 MCG (2000 UT) CAPS, Take 2,000 Units by mouth, Disp: , Rfl:     coenzyme Q10 100 MG CAPS capsule, Take 100 mg by mouth 3 times daily, Disp: , Rfl:     zinc gluconate 50 MG tablet, Take 50 mg by mouth daily, Disp: , Rfl:     spironolactone (ALDACTONE) 50 MG tablet, Take 1 tablet by mouth daily, Disp: 30 tablet, Rfl: 5    cetirizine (ZYRTEC) 10 MG tablet, Take 10 mg by mouth daily, Disp: , Rfl:     ALLERGIES:   Allergies   Allergen Reactions    Indocin [Indomethacin] Nausea And Vomiting    Gabapentin      fever       FAMILY HISTORY:       Problem Relation Age of Onset    Asthma Mother     Heart Disease Father     High Blood Pressure Father          SOCIAL HISTORY:   Social History     Socioeconomic History    Marital status: Single     Spouse name: Not on file    Number of children: Not on file    Years of education: Not on file    Highest education level: Not on file   Occupational History    Not on file   Tobacco Use    Smoking status: Never Smoker    Smokeless tobacco: Never Used   Vaping Use    Vaping Use: Never used   Substance and Sexual Activity    Alcohol use: Yes     Comment: social    Drug use: No    Sexual activity: Never   Other Topics Concern    Not on file   Social History Narrative    Not on file     Social Determinants of Health     Financial Resource Strain:     Difficulty of Paying Living Expenses:    Food Insecurity:     Worried About Running Out of Food in the Last Year:     [address] in the Last Year:    Transportation Needs:     Lack of Transportation (Medical):  Lack of Transportation (Non-Medical):    Physical Activity:     Days of Exercise per Week:     Minutes of Exercise per Session:    Stress:     Feeling of Stress :    Social Connections:     Frequency of Communication with Friends and Family:     Frequency of Social Gatherings with Friends and Family:     Attends Jew Services:     Active Member of Clubs or Organizations:     Attends Club or Organization Meetings:     Marital Status:    Intimate Partner Violence:     Fear of Current or Ex-Partner:     Emotionally Abused:     Physically Abused:     Sexually Abused:        REVIEW OF SYSTEMS: A 12-point review of systems was obtained and pertinent positives and negatives were listed below. REVIEW OF SYSTEMS:     Constitutional: No fever, no chills, no lethargy, no weakness. HEENT:  No headache, otalgia, itchy eyes, nasal discharge or sore throat. Cardiac:  No chest pain, dyspnea, orthopnea or PND. Chest:   No cough, phlegm or wheezing. Abdomen:      Detailed by MA   Neuro:  No focal weakness, abnormal movements or seizure like activity. Skin:   No rashes, no itching. :   No hematuria, no pyuria, no dysuria, no flank pain. Extremities:  No swelling or joint pains.   ROS was otherwise negative    Review of Systems   Constitutional: Positive for appetite change. HENT: Negative. Eyes: Negative. Respiratory: Negative. Cardiovascular: Negative. Gastrointestinal: Negative. Negative for blood in stool and constipation. Endocrine: Negative. Genitourinary: Negative. Musculoskeletal: Positive for myalgias. Negative for back pain and gait problem. Skin: Negative. Allergic/Immunologic: Negative. Neurological: Positive for dizziness, weakness, light-headedness and headaches. Hematological: Negative. Psychiatric/Behavioral: Negative. All other systems reviewed and are negative. PHYSICAL EXAMINATION: Vital signs reviewed per the nursing documentation. /85   Wt 108 lb (49 kg)   BMI 19.13 kg/m²   Body mass index is 19.13 kg/m². Physical Exam    Physical Exam   Constitutional: Patient is oriented to person, place, and time. Patient appears well-developed and well-nourished. HENT:   Head: Normocephalic and atraumatic. Eyes: Pupils are equal, round, and reactive to light. EOM are normal.   Neck: Normal range of motion. Neck supple. No JVD present. No tracheal deviation present. No thyromegaly present. Cardiovascular: Normal rate, regular rhythm, normal heart sounds and intact distal pulses. Pulmonary/Chest: Effort normal and breath sounds normal. No stridor. No respiratory distress. He has no wheezes. He has no rales. He exhibits no tenderness. Abdominal: Soft. Bowel sounds are normal. He exhibits no distension and no mass. There is no tenderness. There is no rebound and no guarding. No hernia. Musculoskeletal: Normal range of motion. Lymphadenopathy:    Patient has no cervical adenopathy. Neurological: Patient is alert and oriented to person, place, and time. Psychiatric: Patient has a normal mood and affect.  Patient behavior is normal.       LABORATORY DATA: Reviewed  Lab Results   Component Value Date    WBC 5.6 04/12/2021 hesitate to contact me. I have reviewed and agree with the ROS entered by the MA/LPN from today's encounter documented in a separate note. Chicho Hernandez MD, MPH   [facility]  Office #: [phone]    this note is created with the assistance of a speech recognition program.  While intending to generate a document that actually reflects the content of the visit, the document can still have some errors including those of syntax and sound a like substitutions which may escape proof reading. It such instances, actual meaning can be extrapolated by contextual diversion.

## 2021-06-13 DIAGNOSIS — L70.0 ACNE VULGARIS: ICD-10-CM

## 2021-06-14 ENCOUNTER — TELEPHONE (OUTPATIENT)
Dept: GASTROENTEROLOGY | Age: 31
End: 2021-06-14

## 2021-06-14 RX ORDER — SPIRONOLACTONE 50 MG/1
TABLET, FILM COATED ORAL
Qty: 30 TABLET | Refills: 5 | Status: SHIPPED | OUTPATIENT
Start: 2021-06-14

## 2021-06-14 NOTE — TELEPHONE ENCOUNTER
Left message asking pt to call office to rs appt for 7/1 due to provider out of office. Appt has been cancelled.

## 2021-06-21 RX ORDER — OMEPRAZOLE 40 MG/1
CAPSULE, DELAYED RELEASE ORAL
Qty: 30 CAPSULE | Refills: 2 | Status: SHIPPED | OUTPATIENT
Start: 2021-06-21 | End: 2021-07-20 | Stop reason: ALTCHOICE

## 2021-06-30 ENCOUNTER — OFFICE VISIT (OUTPATIENT)
Dept: NEUROLOGY | Age: 31
End: 2021-06-30
Payer: COMMERCIAL

## 2021-06-30 VITALS
WEIGHT: 104 LBS | SYSTOLIC BLOOD PRESSURE: 120 MMHG | HEIGHT: 63 IN | HEART RATE: 90 BPM | BODY MASS INDEX: 18.43 KG/M2 | DIASTOLIC BLOOD PRESSURE: 87 MMHG

## 2021-06-30 DIAGNOSIS — G43.711 CHRONIC MIGRAINE WITHOUT AURA, WITH INTRACTABLE MIGRAINE, SO STATED, WITH STATUS MIGRAINOSUS: Primary | ICD-10-CM

## 2021-06-30 PROCEDURE — 1036F TOBACCO NON-USER: CPT | Performed by: PSYCHIATRY & NEUROLOGY

## 2021-06-30 PROCEDURE — 99214 OFFICE O/P EST MOD 30 MIN: CPT | Performed by: PSYCHIATRY & NEUROLOGY

## 2021-06-30 PROCEDURE — G8427 DOCREV CUR MEDS BY ELIG CLIN: HCPCS | Performed by: PSYCHIATRY & NEUROLOGY

## 2021-06-30 PROCEDURE — G8419 CALC BMI OUT NRM PARAM NOF/U: HCPCS | Performed by: PSYCHIATRY & NEUROLOGY

## 2021-06-30 RX ORDER — ERENUMAB-AOOE 70 MG/ML
70 INJECTION SUBCUTANEOUS
Qty: 1 PEN | Refills: 2 | Status: SHIPPED | OUTPATIENT
Start: 2021-06-30 | End: 2021-07-30

## 2021-06-30 NOTE — PROGRESS NOTES
[address].  [facility], [address] [place] [address], [address] [place] [address]  Ph: [phone] or [phone]  FAX: [phone]    Chief Complaint: migraines     Dear RODRIGUE Styles - CNP     I had the pleasure of seeing your patient today in neurology consultation for her symptoms. As you would recall Caleb Hartman is a 27 y.o. female. The history has been obtained from the patient and from the accompanying medical records. The patient was at her baseline until 14years of age when she was diagnosed with migraines. She has 6-7 migraines a week. Her headaches are located mostly on the left side of her head and involve the left eye area. They can also involve the occipital area. They are discribed as sharp stabbing pain and are associated with photophobia, phonophobia and nausea and vomiting. She reports occational tingling and dropping things; these can happen with or without a migraine. She does not have headache free days. Poor sleep, hygiene or processed foods usually cause her migraines. The patient has been on metoprolol. She reports no significant relief in headaches with Botox. She states she has used Nurtec as a rescue medication for her headaches and reports significant improvement with it, she is using it once a week for her symptom. Patient also admits to fatigue and joint pain. Today, the patient reports that she had to have her gallbladder removed and developed an allergy to gabapentin after experiencing a prolonged fever (lasting approximately 8 months). She reports experiencing headaches and/or migraines on a daily basis with no relief from the Botox injections. MRI Brain W WO Contrast on 02/20/2021: Normal MR brain  Previous prophylactic medications: Metoprolol, amitriptylinestopped due to brain fog.   Allergic to indomethacin      REVIEW OF SYSTEMS   Constitutional Weight: present, Appetite: present (no appetite), Fatigue: present   HEENT Visual disturbance: present, Ears: pain and ringing   Respiratory Shortness of breath: absent, Cough: absent   Cardiovascular Chest pain: absent, Leg swelling :absent   GI Constipation: absent, Diarrhea: present, Swallowing change: absent    Urinary frequency: absent, Urinary urgency: absent,    Musculoskeletal Neck pain: present, Back pain: present, Stiffness: present, Muscle pain: present, Joint pain: present   Dermatological Hair loss: absent, Skin changes: absent   Neurological Memory loss: present, Confusion: absent, Seizures: absent, Trouble walking or imbalance: present, Dizziness: present, Weakness: present, Numbness: present Tremor: absent Spasm: absent, Speech difficulty: absent, Headache: present, Light sensitivity: present   Psychiatric Anxiety: absent, Hallucination: absent, Depression, absent   Hematologic Abnormal bleeding/Bruising: absent, Anemia: absent       Neurological work up:  CT head  CTA head and neck  MRI brain   2 D echo     Past Medical History:   Diagnosis Date    Allergic rhinitis     Fibromyalgia     Headache     Neurogenic syncope     Tachycardia      Past Surgical History:   Procedure Laterality Date    CHOLECYSTECTOMY, LAPAROSCOPIC  04/22/2021    CHOLECYSTECTOMY LAPAROSCOPIC ROBOTIC    CHOLECYSTECTOMY, LAPAROSCOPIC N/A 4/22/2021    CHOLECYSTECTOMY LAPAROSCOPIC ROBOTIC performed by Mio Borrego DO at [address] [place] [address]  2013    surgical hip dislocation     Allergies   Allergen Reactions    Indocin [Indomethacin] Nausea And Vomiting    Gabapentin      fever     Family History   Problem Relation Age of Onset    Asthma Mother     Heart Disease Father     High Blood Pressure Father       Social History     Socioeconomic History    Marital status: Single     Spouse name: Not on file    Number of children: Not on file    Years of education: Not on file    Highest education level: Not on file   Occupational History    Not on file   Tobacco Use    Smoking status: Never Smoker    Smokeless tobacco: Never Used   Vaping Use    Vaping Use: Never used   Substance and Sexual Activity    Alcohol use: Yes     Comment: social    Drug use: No    Sexual activity: Never   Other Topics Concern    Not on file   Social History Narrative    Not on file     Social Determinants of Health     Financial Resource Strain:     Difficulty of Paying Living Expenses:    Food Insecurity:     Worried About Running Out of Food in the Last Year:     [address] in the Last Year:    Transportation Needs:     Lack of Transportation (Medical):  Lack of Transportation (Non-Medical):    Physical Activity:     Days of Exercise per Week:     Minutes of Exercise per Session:    Stress:     Feeling of Stress :    Social Connections:     Frequency of Communication with Friends and Family:     Frequency of Social Gatherings with Friends and Family:     Attends Sabianist Services:     Active Member of Clubs or Organizations:     Attends Club or Organization Meetings:     Marital Status:    Intimate Partner Violence:     Fear of Current or Ex-Partner:     Emotionally Abused:     Physically Abused:     Sexually Abused:       /87 (Site: Right Upper Arm, Position: Sitting, Cuff Size: Medium Adult)   Pulse 90   Ht 5' 3\" (1.6 m)   Wt 104 lb (47.2 kg)   BMI 18.42 kg/m²      Physical examination:  General appearance: Normal. Well groomed.  In no acute distress    Head: Normocephalic, atraumatic  Eyes: Extraocular movements intact, eye lids normal  Lungs: Respirations unlabored, chest wall no deformity  ENT: Normal external ear canals, no sinus tenderness  Heart: Regular rate rhythm  Abdomen: No masses, tenderness  Extremities: No cyanosis or edema, 2+ pulses  Musculoskeletal: Normal range of motion in all joints  Skin: Intact, normal skin color    Neurological examination:    Mental status   Alert and oriented; intact memory with no confusion, speech or language problems; no hallucinations or delusions     Cranial nerves   II - visual fields intact to confrontation                                                III, IV, VI  extra-ocular muscles full: no pupillary defect; no TASHA, no nystagmus, no ptosis   V - normal facial sensation                                                               VII - normal facial symmetry                                                             VIII - intact hearing                                                                             IX, X - symmetrical palate                                                                  XI - symmetrical shoulder shrug                                                       XII - midline tongue without atrophy or fasciculation     Motor function  Normal muscle bulk and tone; normal power 5/5, including fine motor movements     Sensory function Intact to touch, pin, vibration, proprioception     Cerebellar Intact fine motor movement. No involuntary movements or tremors     Reflex function Intact 2+ DTR and symmetric. Negative Babinski     Gait                  Normal station and gait       Lab Results   Component Value Date    LDLCALC 126 09/14/2018     No components found for: CHLPL  Lab Results   Component Value Date    TRIG 49 09/14/2018     Lab Results   Component Value Date    HDL 71 (A) 09/14/2018     Lab Results   Component Value Date    LDLCALC 126 09/14/2018     MRI Brain W WO Contrast on 02/20/2021: Normal MR brain     All of patient's labs were personally reviewed. All the imaging studies were personally reviewed and discussed with the patient. Assessment Recommendations:  Chronic migraine WO Aura, intractable, W Status Migrainosus    The patient has a longstanding history of intractable migraines. She reports daily Migraines with no significant improvement with Botox injections. Nurtec has proven beneficial as a rescue medication.  She was started on [place] and [place] previously but was denied by insurance. Topamax causes her nausea. The MRI was normal. I discussed with the patient the possibility of using injectable prophylaxis such as Aimovig and Emgalilty. Discussed with patient the side effects associated with the medication. I recommend the patient begin 79 MG/ML of Aimovig to relieve persistent migraine pain. Follow up in 2-3 months to discuss the effect of the new medication. Yazmin Nelson, APRN - CNP I would like to thank you for the consult. Please do not hesitate if you have any questions about the patient care. Scribe Attestation:   By signing my name below, I, Blake Hidalgo, attest that this documentation has been prepared under the direction and in the presence of Nima Da Silva MD.     Electronically Signed: Blake Hidalgo. 6/30/2021 at 1:59 PM    Physician Attestation:   Chan Hathaway MD, personally performed the services described in this documentation. All medical record entries made by the scribe were at my direction and in my presence. I have reviewed the chart and discharge instructions (if applicable) and agree that the record reflects my personal performance and is accurate and complete.     Electronically Signed: Vaishali Art 6/30/2021 1:59 PM    Diplomate, American Board of Psychiatry and Neurology  Diplomate, American Board of Clinical Neurophysiology  Diplomate, American Board of Epilepsy

## 2021-07-15 ENCOUNTER — TELEPHONE (OUTPATIENT)
Dept: NEUROLOGY | Age: 31
End: 2021-07-15

## 2021-07-15 NOTE — TELEPHONE ENCOUNTER
PA questions [facility]ed to [facility] for Huseyin Breath along with office visit notes. PA also submitted on CMM.

## 2021-07-20 ENCOUNTER — OFFICE VISIT (OUTPATIENT)
Dept: GASTROENTEROLOGY | Age: 31
End: 2021-07-20
Payer: COMMERCIAL

## 2021-07-20 ENCOUNTER — TELEPHONE (OUTPATIENT)
Dept: GASTROENTEROLOGY | Age: 31
End: 2021-07-20

## 2021-07-20 VITALS — BODY MASS INDEX: 18.42 KG/M2 | WEIGHT: 104 LBS | SYSTOLIC BLOOD PRESSURE: 115 MMHG | DIASTOLIC BLOOD PRESSURE: 72 MMHG

## 2021-07-20 DIAGNOSIS — K82.8 BILIARY DYSKINESIA: Primary | ICD-10-CM

## 2021-07-20 PROCEDURE — G8419 CALC BMI OUT NRM PARAM NOF/U: HCPCS | Performed by: INTERNAL MEDICINE

## 2021-07-20 PROCEDURE — G8427 DOCREV CUR MEDS BY ELIG CLIN: HCPCS | Performed by: INTERNAL MEDICINE

## 2021-07-20 PROCEDURE — 99213 OFFICE O/P EST LOW 20 MIN: CPT | Performed by: INTERNAL MEDICINE

## 2021-07-20 PROCEDURE — 1036F TOBACCO NON-USER: CPT | Performed by: INTERNAL MEDICINE

## 2021-07-20 RX ORDER — OMEPRAZOLE 20 MG/1
20 CAPSULE, DELAYED RELEASE ORAL 2 TIMES DAILY
Qty: 30 CAPSULE | Refills: 3 | Status: SHIPPED | OUTPATIENT
Start: 2021-07-20 | End: 2021-07-22

## 2021-07-20 ASSESSMENT — ENCOUNTER SYMPTOMS
RESPIRATORY NEGATIVE: 1
CONSTIPATION: 0
BACK PAIN: 0
EYES NEGATIVE: 1
ABDOMINAL PAIN: 1
BLOOD IN STOOL: 0
ALLERGIC/IMMUNOLOGIC NEGATIVE: 1
NAUSEA: 1
ABDOMINAL DISTENTION: 1

## 2021-07-20 NOTE — TELEPHONE ENCOUNTER
Pharmacy called for script clarification. Directions were for patient to take PPI twice a day, but only dispensed 30 tablets (not 60). Office visit note states to increase PPI dosing to 20mg BID. However, Patient is already taking 40mg in morning and 20mg in Evening. Please advise correct dosing for patients PPI.

## 2021-07-20 NOTE — PROGRESS NOTES
GI FOLLOW UP    INTERVAL HISTORY:     Status post cholecystectomy for biliary dyskinesia  Overall, clinically much improved however with mild progression of her GI symptoms over the last few weeks    Chief Complaint   Patient presents with    Follow-up     2 month f/u biliary dyskinesia    Diarrhea     Patient states 3-4 days a week in the morning she will have diarrhea. She states this is much improved compared to before having her gallbladder.  Nausea     Patient states feeling nauseous daily with occasionally vomiting. She states its been better the past 2 weeks. Takes zofran PRN.  Gastroesophageal Reflux     Patient is taking 40mg in morning and 20mg at night. States she has a sour stomach and burning in her stomach almost daily. 1. Biliary dyskinesia          HISTORY OF PRESENT ILLNESS: Ms.Courtney MAYELA Balbuena is a 30 y.o. female with a past history remarkable for persistent nausea, daily symptoms, intermittent vomiting, referred for evaluation of chronic GERD and dyspepsia     Mild dyspepsia symptoms.  However the symptoms have been subacute to chronic for several years now.  Patient reports that her symptoms are often associated with persistent nausea without any episodes of emesis.  She seen modest amount of relief with Zofran and Prilosec.  She was referred to GI to further evaluate her persistent upper GI symptoms.     Smoker: None   Drinking history: Socially  Illicit drugs: none   Abdominal surgeries: None   Prior Colonoscopy: None   Prior EGD: > 5 yrs ago--- per patient, mild gastritis.   FH of GI issues: none        Past Medical,Family, and Social History reviewed and does contribute to the patient presenting condition. Patient's PMH/PSH,SH,PSYCH Hx, MEDs, ALLERGIES, and ROS were all reviewed and updated in the appropriate sections.     PAST MEDICAL HISTORY:  Past Medical History:   Diagnosis Date    Allergic rhinitis     Fibromyalgia     Headache     Neurogenic syncope     Tachycardia        Past Surgical History:   Procedure Laterality Date    CHOLECYSTECTOMY, LAPAROSCOPIC  04/22/2021    CHOLECYSTECTOMY LAPAROSCOPIC ROBOTIC    CHOLECYSTECTOMY, LAPAROSCOPIC N/A 4/22/2021    CHOLECYSTECTOMY LAPAROSCOPIC ROBOTIC performed by Annamaria Madera DO at [address] [place] [address]  2013    surgical hip dislocation       CURRENT MEDICATIONS:    Current Outpatient Medications:     Erenumab-aooe (AIMOVIG) 70 MG/ML SOAJ, Inject 70 mg into the skin every 30 days, Disp: 1 pen, Rfl: 2    omeprazole (PRILOSEC) 40 MG delayed release capsule, TAKE 1 CAPSULE BY MOUTH EVERY MORNING BEFORE BREAKFAST, Disp: 30 capsule, Rfl: 2    spironolactone (ALDACTONE) 50 MG tablet, TAKE 1 TABLET DAILY, Disp: 30 tablet, Rfl: 5    omeprazole (PRILOSEC) 20 MG delayed release capsule, Take 1 capsule by mouth nightly, Disp: 30 capsule, Rfl: 3    ondansetron (ZOFRAN) 4 MG tablet, Take 1 tablet by mouth every 6 hours as needed for Nausea or Vomiting, Disp: 20 tablet, Rfl: 0    metoprolol tartrate (LOPRESSOR) 25 MG tablet, Take 0.5 tablets by mouth 2 times daily, Disp: 30 tablet, Rfl: 5    Rimegepant Sulfate (NURTEC) 75 MG TBDP, Take 1 tablet at HA onset, no more than 4 tabs/wk. , Disp: 8 tablet, Rfl: 0    ALLERGIES:   Allergies   Allergen Reactions    Indocin [Indomethacin] Nausea And Vomiting    Gabapentin      fever       FAMILY HISTORY:       Problem Relation Age of Onset    Asthma Mother     Heart Disease Father     High Blood Pressure Father          SOCIAL HISTORY:   Social History     Socioeconomic History    Marital status: Single     Spouse name: Not on file    Number of children: Not on file    Years of education: Not on file    Highest education level: Not on file   Occupational History    Not on file   Tobacco Use    Smoking status: Never Smoker    Smokeless tobacco: Never Used   Vaping Use    Vaping Use: Never used   Substance and Sexual Activity    Alcohol use: Yes     Comment: social    Drug use: No    Sexual activity: Never   Other Topics Concern    Not on file   Social History Narrative    Not on file     Social Determinants of Health     Financial Resource Strain:     Difficulty of Paying Living Expenses:    Food Insecurity:     Worried About Running Out of Food in the Last Year:     [address] in the Last Year:    Transportation Needs:     Lack of Transportation (Medical):  Lack of Transportation (Non-Medical):    Physical Activity:     Days of Exercise per Week:     Minutes of Exercise per Session:    Stress:     Feeling of Stress :    Social Connections:     Frequency of Communication with Friends and Family:     Frequency of Social Gatherings with Friends and Family:     Attends Faith Services:     Active Member of Clubs or Organizations:     Attends Club or Organization Meetings:     Marital Status:    Intimate Partner Violence:     Fear of Current or Ex-Partner:     Emotionally Abused:     Physically Abused:     Sexually Abused:        REVIEW OF SYSTEMS: A 12-point review of systems was obtained and pertinent positives and negatives were listed below. REVIEW OF SYSTEMS:     Constitutional: No fever, no chills, no lethargy, no weakness. HEENT:  No headache, otalgia, itchy eyes, nasal discharge or sore throat. Cardiac:  No chest pain, dyspnea, orthopnea or PND. Chest:   No cough, phlegm or wheezing. Abdomen:      Detailed by MA   Neuro:  No focal weakness, abnormal movements or seizure like activity. Skin:   No rashes, no itching. :   No hematuria, no pyuria, no dysuria, no flank pain. Extremities:  No swelling or joint pains. ROS was otherwise negative    Review of Systems   Constitutional: Positive for appetite change. HENT: Negative. Eyes: Negative. Respiratory: Negative. Cardiovascular: Negative. Gastrointestinal: Positive for abdominal distention, abdominal pain and nausea. Negative for blood in stool and constipation. Endocrine: Negative. Genitourinary: Negative. Musculoskeletal: Positive for myalgias. Negative for back pain and gait problem. Skin: Negative. Allergic/Immunologic: Negative. Neurological: Positive for dizziness, weakness, light-headedness and headaches. Hematological: Negative. Psychiatric/Behavioral: Negative. All other systems reviewed and are negative. PHYSICAL EXAMINATION: Vital signs reviewed per the nursing documentation. /72   Wt 104 lb (47.2 kg)   BMI 18.42 kg/m²   Body mass index is 18.42 kg/m². Physical Exam    Physical Exam   Constitutional: Patient is oriented to person, place, and time. Patient appears well-developed and well-nourished. HENT:   Head: Normocephalic and atraumatic. Eyes: Pupils are equal, round, and reactive to light. EOM are normal.   Neck: Normal range of motion. Neck supple. No JVD present. No tracheal deviation present. No thyromegaly present. Cardiovascular: Normal rate, regular rhythm, normal heart sounds and intact distal pulses. Pulmonary/Chest: Effort normal and breath sounds normal. No stridor. No respiratory distress. He has no wheezes. He has no rales. He exhibits no tenderness. Abdominal: Soft. Bowel sounds are normal. He exhibits no distension and no mass. There is no tenderness. There is no rebound and no guarding. No hernia. Musculoskeletal: Normal range of motion. Lymphadenopathy:    Patient has no cervical adenopathy. Neurological: Patient is alert and oriented to person, place, and time. Psychiatric: Patient has a normal mood and affect.  Patient behavior is normal.       LABORATORY DATA: Reviewed  Lab Results   Component Value Date    WBC 5.6 04/12/2021    WBC 5.6 04/12/2021    HGB 13.2 04/12/2021    HCT 39.4 04/12/2021    MCV 86.8 04/12/2021     04/12/2021     03/30/2021    K 4.5 03/30/2021     03/30/2021    CO2 25 03/30/2021    BUN 10 03/30/2021    CREATININE 0.59 03/30/2021    LABALBU 4.8 03/30/2021    BILITOT 0.16 (L) 03/30/2021    ALKPHOS 62 03/30/2021    AST 15 03/30/2021    ALT 9 03/30/2021         Lab Results   Component Value Date    RBC 4.54 04/12/2021    HGB 13.2 04/12/2021    MCV 86.8 04/12/2021    MCH 29.1 04/12/2021    MCHC 33.5 04/12/2021    RDW 13.3 04/12/2021    MPV 10.2 04/12/2021    BASOPCT 1 04/12/2021    LYMPHSABS 1.51 04/12/2021    MONOSABS 0.26 04/12/2021    NEUTROABS 3.65 04/12/2021    EOSABS 78 (L) 04/12/2021    EOSABS 0.08 04/12/2021    BASOSABS 0.06 04/12/2021         DIAGNOSTIC TESTING:     No results found. IMPRESSION: Ms.Courtney MAYELA Balbuena is a 30 y.o. female with a past history remarkable for persistent nausea, daily symptoms, intermittent vomiting, referred for evaluation of chronic GERD and dyspepsia      Patient underwent HIDA scan abnormal gallbladder ejection fraction of 25% to suggest that biliary dyskinesia on March 23, 2021patient underwent laparoscopic cholecystectomy and is noted significant improvement in the patient's upper GI symptoms which include chronic nausea.     Operation performed by Dr. Angelica Rivera with a successful postoperative course  Mild persistent trace evidence of nausea that appears to be natural course of patient's operation    Assessment  1. Biliary dyskinesia        PLAN:    1) mild nonspecific dyspepsiasomewhat refractory symptoms reported by the patient but overall the patient reports that she is 70% improved since operation. Increase Prilosec to 20 mg twice daily. Patient advised to avoid any dietary triggers. May need additional medications for dyspepsia symptoms including simethicone. Endoscopic evaluation to be considered if there is no significant improvement with twice daily dosing of PPI. 2)  RTC in 2 months         Thank you for allowing me to participate in the care of Ms. Balbuena.  For any further questions please do not hesitate to contact me. I have reviewed and agree with the ROS entered by the MA/LPN from today's encounter documented in a separate note. Rebeka Price MD, MPH   [facility]  Office #: [phone]    this note is created with the assistance of a speech recognition program.  While intending to generate a document that actually reflects the content of the visit, the document can still have some errors including those of syntax and sound a like substitutions which may escape proof reading. It such instances, actual meaning can be extrapolated by contextual diversion.

## 2021-07-22 RX ORDER — OMEPRAZOLE 20 MG/1
20 CAPSULE, DELAYED RELEASE ORAL 2 TIMES DAILY
Qty: 60 CAPSULE | Refills: 3 | Status: SHIPPED | OUTPATIENT
Start: 2021-07-22 | End: 2022-02-02 | Stop reason: ALTCHOICE

## 2021-07-27 ENCOUNTER — TELEPHONE (OUTPATIENT)
Dept: NEUROLOGY | Age: 31
End: 2021-07-27

## 2021-07-27 NOTE — TELEPHONE ENCOUNTER
Pt. ins. denied the Aimovig. It is based on not meeting the criteria which mandate she must meet the requirements regarding [place]uras and since she doesn't have auras in their eyes she does not meet criteria. Please advise.

## 2021-07-28 NOTE — TELEPHONE ENCOUNTER
Called pt and lm to see if she has tried to get the [address] [place] [address] Road with the coupon.

## 2021-07-28 NOTE — TELEPHONE ENCOUNTER
I believe it will be the same criteria for  CGRP. We can see if she can try it for 3 months with an [address] but she can only get it for  3 months free.

## 2021-08-18 DIAGNOSIS — G43.711 CHRONIC MIGRAINE WITHOUT AURA, WITH INTRACTABLE MIGRAINE, SO STATED, WITH STATUS MIGRAINOSUS: ICD-10-CM

## 2021-08-18 NOTE — TELEPHONE ENCOUNTER
Pharmacy requesting a  refill of NURTEC 75MG.       Medication active on med list yes      Date of last prescription 03/25/2021  with 3 refills verified on 08/18/2021    verified by HOMERO RODRIGUEZ      Date of last appointment 06/30/2021    Next Visit Date:  9/15/2021

## 2021-08-30 RX ORDER — RIMEGEPANT SULFATE 75 MG/75MG
TABLET, ORALLY DISINTEGRATING ORAL
Qty: 8 TABLET | Refills: 0 | Status: SHIPPED | OUTPATIENT
Start: 2021-08-30 | End: 2022-03-14

## 2021-08-31 ENCOUNTER — TELEPHONE (OUTPATIENT)
Dept: NEUROLOGY | Age: 31
End: 2021-08-31

## 2021-09-01 ENCOUNTER — TELEPHONE (OUTPATIENT)
Dept: NEUROLOGY | Age: 31
End: 2021-09-01

## 2021-09-01 NOTE — TELEPHONE ENCOUNTER
Rosie Up ([place]: [id number]) - [id number]  Nurtec 75MG dispersible tablets       Status: PA Response - Approved    Created: August 30th, 2021 [phone]    Sent: August 31st, 2021

## 2021-09-15 ENCOUNTER — OFFICE VISIT (OUTPATIENT)
Dept: NEUROLOGY | Age: 31
End: 2021-09-15
Payer: COMMERCIAL

## 2021-09-15 VITALS
TEMPERATURE: 97.2 F | DIASTOLIC BLOOD PRESSURE: 72 MMHG | HEIGHT: 63 IN | SYSTOLIC BLOOD PRESSURE: 128 MMHG | HEART RATE: 74 BPM | WEIGHT: 113 LBS | BODY MASS INDEX: 20.02 KG/M2

## 2021-09-15 DIAGNOSIS — G43.711 CHRONIC MIGRAINE WITHOUT AURA, WITH INTRACTABLE MIGRAINE, SO STATED, WITH STATUS MIGRAINOSUS: Primary | ICD-10-CM

## 2021-09-15 PROCEDURE — G8420 CALC BMI NORM PARAMETERS: HCPCS | Performed by: PSYCHIATRY & NEUROLOGY

## 2021-09-15 PROCEDURE — 1036F TOBACCO NON-USER: CPT | Performed by: PSYCHIATRY & NEUROLOGY

## 2021-09-15 PROCEDURE — 99214 OFFICE O/P EST MOD 30 MIN: CPT | Performed by: PSYCHIATRY & NEUROLOGY

## 2021-09-15 PROCEDURE — G8427 DOCREV CUR MEDS BY ELIG CLIN: HCPCS | Performed by: PSYCHIATRY & NEUROLOGY

## 2021-09-15 RX ORDER — VERAPAMIL HYDROCHLORIDE 80 MG/1
80 TABLET ORAL NIGHTLY
Qty: 30 TABLET | Refills: 2 | Status: SHIPPED | OUTPATIENT
Start: 2021-09-15 | End: 2022-02-02 | Stop reason: ALTCHOICE

## 2021-09-15 RX ORDER — DULOXETIN HYDROCHLORIDE 30 MG/1
CAPSULE, DELAYED RELEASE ORAL
COMMUNITY
Start: 2021-09-14 | End: 2021-12-02

## 2021-10-04 ENCOUNTER — OFFICE VISIT (OUTPATIENT)
Dept: GASTROENTEROLOGY | Age: 31
End: 2021-10-04
Payer: COMMERCIAL

## 2021-10-04 VITALS
WEIGHT: 101 LBS | BODY MASS INDEX: 17.89 KG/M2 | DIASTOLIC BLOOD PRESSURE: 86 MMHG | SYSTOLIC BLOOD PRESSURE: 119 MMHG | HEART RATE: 126 BPM

## 2021-10-04 DIAGNOSIS — K21.9 GASTROESOPHAGEAL REFLUX DISEASE, UNSPECIFIED WHETHER ESOPHAGITIS PRESENT: ICD-10-CM

## 2021-10-04 DIAGNOSIS — K82.8 BILIARY DYSKINESIA: Primary | ICD-10-CM

## 2021-10-04 DIAGNOSIS — R10.13 DYSPEPSIA: ICD-10-CM

## 2021-10-04 PROCEDURE — 99213 OFFICE O/P EST LOW 20 MIN: CPT | Performed by: INTERNAL MEDICINE

## 2021-10-04 PROCEDURE — G8484 FLU IMMUNIZE NO ADMIN: HCPCS | Performed by: INTERNAL MEDICINE

## 2021-10-04 PROCEDURE — 1036F TOBACCO NON-USER: CPT | Performed by: INTERNAL MEDICINE

## 2021-10-04 PROCEDURE — G8418 CALC BMI BLW LOW PARAM F/U: HCPCS | Performed by: INTERNAL MEDICINE

## 2021-10-04 PROCEDURE — G8427 DOCREV CUR MEDS BY ELIG CLIN: HCPCS | Performed by: INTERNAL MEDICINE

## 2021-10-04 RX ORDER — ONDANSETRON 4 MG/1
4 TABLET, ORALLY DISINTEGRATING ORAL EVERY 8 HOURS PRN
Qty: 30 TABLET | Refills: 0 | Status: SHIPPED | OUTPATIENT
Start: 2021-10-04 | End: 2022-02-02 | Stop reason: SDUPTHER

## 2021-10-04 ASSESSMENT — ENCOUNTER SYMPTOMS
ABDOMINAL PAIN: 1
CONSTIPATION: 0
VOMITING: 1
ABDOMINAL DISTENTION: 1
BACK PAIN: 0
BLOOD IN STOOL: 0
NAUSEA: 1
RESPIRATORY NEGATIVE: 1
ALLERGIC/IMMUNOLOGIC NEGATIVE: 1
EYES NEGATIVE: 1
DIARRHEA: 1

## 2021-10-04 NOTE — PROGRESS NOTES
GI FOLLOW UP    INTERVAL HISTORY:     Dyspepsia, unintentional weight loss  Diarrhea    Chief Complaint   Patient presents with    Follow-up     GERD follow up     Gastroesophageal Reflux     Prilosec 40mg BID. States having burning in her stomach daily. Pukes maybe 2 times a week. Tries to eat as bland as possible - can not figure out any triggers.  Diarrhea     Patient previously had diarrhea every morning, but since starting [employer]ov[employer] she only has it a few times a week.  Abdominal Pain     Abdominal pain/discomfort in the middle of her stomach - feels like a spasm. Patient has SUNSHINE and is continuously losing weight. 1. Biliary dyskinesia    2. Gastroesophageal reflux disease, unspecified whether esophagitis present    3. Dyspepsia          HISTORY OF PRESENT ILLNESS: Ms.Courtney MAYELA Balbuena is a 31 y.o. female with a past history remarkable for persistent nausea, daily symptoms, intermittent vomiting, referred for evaluation of chronic GERD and dyspepsia     Mild dyspepsia symptoms.  However the symptoms have been subacute to chronic for several years now. Maile Search reports that her symptoms are often associated with persistent nausea without any episodes of emesis.  She seen modest amount of relief with Zofran and Prilosec.  She was referred to GI to further evaluate her persistent upper GI symptoms.     Smoker: None   Drinking history: Socially  Illicit drugs: none   Abdominal surgeries: None   Prior Colonoscopy: None   Prior EGD: > 5 yrs ago--- per patient, mild gastritis.   FH of GI issues: none     Past Medical,Family, and Social History reviewed and does contribute to the patient presenting condition. Patient's PMH/PSH,SH,PSYCH Hx, MEDs, ALLERGIES, and ROS were all reviewed and updated in the appropriate sections.     PAST MEDICAL HISTORY:  Past Medical History:   Diagnosis Date    Allergic rhinitis     Fibromyalgia     Headache     Neurogenic syncope     Tachycardia        Past Surgical History:   Procedure Laterality Date    CHOLECYSTECTOMY, LAPAROSCOPIC  04/22/2021    CHOLECYSTECTOMY LAPAROSCOPIC ROBOTIC    CHOLECYSTECTOMY, LAPAROSCOPIC N/A 4/22/2021    CHOLECYSTECTOMY LAPAROSCOPIC ROBOTIC performed by Estelle Culver DO at [address] [place] [address]  2013    surgical hip dislocation       CURRENT MEDICATIONS:    Current Outpatient Medications:     DULoxetine (CYMBALTA) 30 MG extended release capsule, , Disp: , Rfl:     verapamil (CALAN) 80 MG tablet, Take 1 tablet by mouth nightly, Disp: 30 tablet, Rfl: 2    Rimegepant Sulfate (NURTEC) 75 MG TBDP, TAKE 1 TABLET AT ONSET OF HEADACHE, NO MORE THAN 4 TABS A WEEK -SPECIAL ORDER PLEASE GIVE 3 DAYS NOTICE, Disp: 8 tablet, Rfl: 0    omeprazole (PRILOSEC) 20 MG delayed release capsule, Take 1 capsule by mouth 2 times daily, Disp: 60 capsule, Rfl: 3    spironolactone (ALDACTONE) 50 MG tablet, TAKE 1 TABLET DAILY, Disp: 30 tablet, Rfl: 5    ondansetron (ZOFRAN) 4 MG tablet, Take 1 tablet by mouth every 6 hours as needed for Nausea or Vomiting, Disp: 20 tablet, Rfl: 0    metoprolol tartrate (LOPRESSOR) 25 MG tablet, Take 0.5 tablets by mouth 2 times daily, Disp: 30 tablet, Rfl: 5    ALLERGIES:   Allergies   Allergen Reactions    Indocin [Indomethacin] Nausea And Vomiting    Gabapentin      fever       FAMILY HISTORY:       Problem Relation Age of Onset    Asthma Mother     Heart Disease Father     High Blood Pressure Father          SOCIAL HISTORY:   Social History     Socioeconomic History    Marital status: Single     Spouse name: Not on file    Number of children: Not on file    Years of education: Not on file    Highest education level: Not on file   Occupational History    Not on file   Tobacco Use    Smoking status: Never Smoker    Smokeless tobacco: Never Used   Vaping Use    Vaping Use: Never used Substance and Sexual Activity    Alcohol use: Yes     Comment: social    Drug use: No    Sexual activity: Never   Other Topics Concern    Not on file   Social History Narrative    Not on file     Social Determinants of Health     Financial Resource Strain:     Difficulty of Paying Living Expenses:    Food Insecurity:     Worried About Running Out of Food in the Last Year:     [address] in the Last Year:    Transportation Needs:     Lack of Transportation (Medical):  Lack of Transportation (Non-Medical):    Physical Activity:     Days of Exercise per Week:     Minutes of Exercise per Session:    Stress:     Feeling of Stress :    Social Connections:     Frequency of Communication with Friends and Family:     Frequency of Social Gatherings with Friends and Family:     Attends Zoroastrianism Services:     Active Member of Clubs or Organizations:     Attends Club or Organization Meetings:     Marital Status:    Intimate Partner Violence:     Fear of Current or Ex-Partner:     Emotionally Abused:     Physically Abused:     Sexually Abused:        REVIEW OF SYSTEMS: A 12-point review of systems was obtained and pertinent positives and negatives were listed below. REVIEW OF SYSTEMS:     Constitutional: No fever, no chills, no lethargy, no weakness. HEENT:  No headache, otalgia, itchy eyes, nasal discharge or sore throat. Cardiac:  No chest pain, dyspnea, orthopnea or PND. Chest:   No cough, phlegm or wheezing. Abdomen:      Detailed by MA   Neuro:  No focal weakness, abnormal movements or seizure like activity. Skin:   No rashes, no itching. :   No hematuria, no pyuria, no dysuria, no flank pain. Extremities:  No swelling or joint pains. ROS was otherwise negative    Review of Systems   Constitutional: Positive for appetite change and unexpected weight change. HENT: Negative. Eyes: Negative. Respiratory: Negative. Cardiovascular: Negative.     Gastrointestinal: Positive for abdominal distention, abdominal pain, diarrhea, nausea and vomiting. Negative for blood in stool and constipation. Endocrine: Negative. Genitourinary: Negative. Musculoskeletal: Positive for myalgias. Negative for back pain and gait problem. Skin: Negative. Allergic/Immunologic: Negative. Neurological: Positive for dizziness, weakness, light-headedness and headaches. Hematological: Negative. Psychiatric/Behavioral: Negative. All other systems reviewed and are negative. PHYSICAL EXAMINATION: Vital signs reviewed per the nursing documentation. /86   Pulse 126   Wt 101 lb (45.8 kg)   BMI 17.89 kg/m²   Body mass index is 17.89 kg/m². Physical Exam    Physical Exam   Constitutional: Patient is oriented to person, place, and time. Patient appears well-developed and well-nourished. HENT:   Head: Normocephalic and atraumatic. Eyes: Pupils are equal, round, and reactive to light. EOM are normal.   Neck: Normal range of motion. Neck supple. No JVD present. No tracheal deviation present. No thyromegaly present. Cardiovascular: Normal rate, regular rhythm, normal heart sounds and intact distal pulses. Pulmonary/Chest: Effort normal and breath sounds normal. No stridor. No respiratory distress. He has no wheezes. He has no rales. He exhibits no tenderness. Abdominal: Soft. Bowel sounds are normal. He exhibits no distension and no mass. There is no tenderness. There is no rebound and no guarding. No hernia. Musculoskeletal: Normal range of motion. Lymphadenopathy:    Patient has no cervical adenopathy. Neurological: Patient is alert and oriented to person, place, and time. Psychiatric: Patient has a normal mood and affect.  Patient behavior is normal.       LABORATORY DATA: Reviewed  Lab Results   Component Value Date    WBC 5.6 04/12/2021    WBC 5.6 04/12/2021    HGB 13.2 04/12/2021    HCT 39.4 04/12/2021    MCV 86.8 04/12/2021     04/12/2021     03/30/2021    K 4.5 03/30/2021     03/30/2021    CO2 25 03/30/2021    BUN 10 03/30/2021    CREATININE 0.59 03/30/2021    LABALBU 4.8 03/30/2021    BILITOT 0.16 (L) 03/30/2021    ALKPHOS 62 03/30/2021    AST 15 03/30/2021    ALT 9 03/30/2021         Lab Results   Component Value Date    RBC 4.54 04/12/2021    HGB 13.2 04/12/2021    MCV 86.8 04/12/2021    MCH 29.1 04/12/2021    MCHC 33.5 04/12/2021    RDW 13.3 04/12/2021    MPV 10.2 04/12/2021    BASOPCT 1 04/12/2021    LYMPHSABS 1.51 04/12/2021    MONOSABS 0.26 04/12/2021    NEUTROABS 3.65 04/12/2021    EOSABS 78 (L) 04/12/2021    EOSABS 0.08 04/12/2021    BASOSABS 0.06 04/12/2021         DIAGNOSTIC TESTING:     No results found. IMPRESSION: Alem Balbuena is a 30 y.o. female with a past history remarkable for persistent nausea, daily symptoms, intermittent vomiting, referred for evaluation of chronic GERD and dyspepsia        Patient underwent HIDA scan abnormal gallbladder ejection fraction of 25% to suggest that biliary dyskinesia on March 23, 2021patient underwent laparoscopic cholecystectomy and is noted significant improvement in the patient's upper GI symptoms which include chronic nausea.     Operation performed by Dr. Mere Lopez 80 with a successful postoperative course  Mild persistent trace evidence of nausea that appears to be natural course of patient's operation    Currently with refractory symptoms of nausea and dyspepsia. Food aversion noted related to persistent nausea and abdominal pain      Assessment  1. Biliary dyskinesia    2. Gastroesophageal reflux disease, unspecified whether esophagitis present    3. Dyspepsia        PLAN:    1) dyspepsia and persistent nausea with food aversionplan for diagnostic EGD. Risks, benefits, alternative discussed with the patient.   She agreed with the plan    2) malnourishment observedwe will plan on sending patient to dietitian to optimize her nutritional intake and caloric intake    3) patient may require eventual colonoscopy after upper endoscopy. Thank you for allowing me to participate in the care of Ms. Balbuena. For any further questions please do not hesitate to contact me. I have reviewed and agree with the ROS entered by the MA/LPN from today's encounter documented in a separate note. Swathi Zavala MD, MPH   [facility]  Office #: [phone]    this note is created with the assistance of a speech recognition program.  While intending to generate a document that actually reflects the content of the visit, the document can still have some errors including those of syntax and sound a like substitutions which may escape proof reading. It such instances, actual meaning can be extrapolated by contextual diversion.

## 2021-10-08 ENCOUNTER — ANESTHESIA EVENT (OUTPATIENT)
Dept: OPERATING ROOM | Age: 31
End: 2021-10-08
Payer: COMMERCIAL

## 2021-10-12 ENCOUNTER — ANESTHESIA (OUTPATIENT)
Dept: OPERATING ROOM | Age: 31
End: 2021-10-12
Payer: COMMERCIAL

## 2021-10-12 ENCOUNTER — HOSPITAL ENCOUNTER (OUTPATIENT)
Age: 31
Setting detail: OUTPATIENT SURGERY
Discharge: HOME | End: 2021-10-12
Attending: INTERNAL MEDICINE | Admitting: INTERNAL MEDICINE
Payer: COMMERCIAL

## 2021-10-12 VITALS
WEIGHT: 104.8 LBS | DIASTOLIC BLOOD PRESSURE: 71 MMHG | HEART RATE: 76 BPM | BODY MASS INDEX: 18.57 KG/M2 | RESPIRATION RATE: 16 BRPM | TEMPERATURE: 98.2 F | HEIGHT: 63 IN | SYSTOLIC BLOOD PRESSURE: 106 MMHG | OXYGEN SATURATION: 100 %

## 2021-10-12 VITALS
DIASTOLIC BLOOD PRESSURE: 50 MMHG | SYSTOLIC BLOOD PRESSURE: 91 MMHG | RESPIRATION RATE: 21 BRPM | OXYGEN SATURATION: 96 %

## 2021-10-12 PROCEDURE — 7100000011 HC PHASE II RECOVERY - ADDTL 15 MIN: Performed by: INTERNAL MEDICINE

## 2021-10-12 PROCEDURE — 88342 IMHCHEM/IMCYTCHM 1ST ANTB: CPT

## 2021-10-12 PROCEDURE — 7100000010 HC PHASE II RECOVERY - FIRST 15 MIN: Performed by: INTERNAL MEDICINE

## 2021-10-12 PROCEDURE — 2709999900 HC NON-CHARGEABLE SUPPLY: Performed by: INTERNAL MEDICINE

## 2021-10-12 PROCEDURE — 3700000000 HC ANESTHESIA ATTENDED CARE: Performed by: INTERNAL MEDICINE

## 2021-10-12 PROCEDURE — 3609012400 HC EGD TRANSORAL BIOPSY SINGLE/MULTIPLE: Performed by: INTERNAL MEDICINE

## 2021-10-12 PROCEDURE — 6360000002 HC RX W HCPCS: Performed by: NURSE ANESTHETIST, CERTIFIED REGISTERED

## 2021-10-12 PROCEDURE — 2500000003 HC RX 250 WO HCPCS: Performed by: NURSE ANESTHETIST, CERTIFIED REGISTERED

## 2021-10-12 PROCEDURE — 88305 TISSUE EXAM BY PATHOLOGIST: CPT

## 2021-10-12 PROCEDURE — 2580000003 HC RX 258: Performed by: ANESTHESIOLOGY

## 2021-10-12 PROCEDURE — 43239 EGD BIOPSY SINGLE/MULTIPLE: CPT | Performed by: INTERNAL MEDICINE

## 2021-10-12 RX ORDER — ONDANSETRON 2 MG/ML
4 INJECTION INTRAMUSCULAR; INTRAVENOUS
Status: DISCONTINUED | OUTPATIENT
Start: 2021-10-12 | End: 2021-10-12 | Stop reason: HOSPADM

## 2021-10-12 RX ORDER — LIDOCAINE HYDROCHLORIDE 10 MG/ML
INJECTION, SOLUTION EPIDURAL; INFILTRATION; INTRACAUDAL; PERINEURAL PRN
Status: DISCONTINUED | OUTPATIENT
Start: 2021-10-12 | End: 2021-10-12 | Stop reason: SDUPTHER

## 2021-10-12 RX ORDER — HYDROCODONE BITARTRATE AND ACETAMINOPHEN 5; 325 MG/1; MG/1
2 TABLET ORAL PRN
Status: DISCONTINUED | OUTPATIENT
Start: 2021-10-12 | End: 2021-10-12 | Stop reason: HOSPADM

## 2021-10-12 RX ORDER — SODIUM CHLORIDE 9 MG/ML
INJECTION, SOLUTION INTRAVENOUS CONTINUOUS
Status: DISCONTINUED | OUTPATIENT
Start: 2021-10-12 | End: 2021-10-12 | Stop reason: HOSPADM

## 2021-10-12 RX ORDER — SODIUM CHLORIDE, SODIUM LACTATE, POTASSIUM CHLORIDE, CALCIUM CHLORIDE 600; 310; 30; 20 MG/100ML; MG/100ML; MG/100ML; MG/100ML
INJECTION, SOLUTION INTRAVENOUS CONTINUOUS
Status: DISCONTINUED | OUTPATIENT
Start: 2021-10-12 | End: 2021-10-12 | Stop reason: HOSPADM

## 2021-10-12 RX ORDER — FENTANYL CITRATE 50 UG/ML
25 INJECTION, SOLUTION INTRAMUSCULAR; INTRAVENOUS EVERY 5 MIN PRN
Status: DISCONTINUED | OUTPATIENT
Start: 2021-10-12 | End: 2021-10-12 | Stop reason: HOSPADM

## 2021-10-12 RX ORDER — HYDROCODONE BITARTRATE AND ACETAMINOPHEN 5; 325 MG/1; MG/1
1 TABLET ORAL PRN
Status: DISCONTINUED | OUTPATIENT
Start: 2021-10-12 | End: 2021-10-12 | Stop reason: HOSPADM

## 2021-10-12 RX ORDER — DIPHENHYDRAMINE HYDROCHLORIDE 50 MG/ML
12.5 INJECTION INTRAMUSCULAR; INTRAVENOUS
Status: DISCONTINUED | OUTPATIENT
Start: 2021-10-12 | End: 2021-10-12 | Stop reason: HOSPADM

## 2021-10-12 RX ORDER — PROPOFOL 10 MG/ML
INJECTION, EMULSION INTRAVENOUS PRN
Status: DISCONTINUED | OUTPATIENT
Start: 2021-10-12 | End: 2021-10-12 | Stop reason: SDUPTHER

## 2021-10-12 RX ORDER — ERENUMAB-AOOE 70 MG/ML
INJECTION SUBCUTANEOUS
COMMUNITY
End: 2022-03-14

## 2021-10-12 RX ORDER — SODIUM CHLORIDE 0.9 % (FLUSH) 0.9 %
10 SYRINGE (ML) INJECTION PRN
Status: DISCONTINUED | OUTPATIENT
Start: 2021-10-12 | End: 2021-10-12 | Stop reason: HOSPADM

## 2021-10-12 RX ORDER — PROMETHAZINE HYDROCHLORIDE 25 MG/ML
6.25 INJECTION, SOLUTION INTRAMUSCULAR; INTRAVENOUS
Status: DISCONTINUED | OUTPATIENT
Start: 2021-10-12 | End: 2021-10-12 | Stop reason: HOSPADM

## 2021-10-12 RX ORDER — MEPERIDINE HYDROCHLORIDE 50 MG/ML
12.5 INJECTION INTRAMUSCULAR; INTRAVENOUS; SUBCUTANEOUS EVERY 5 MIN PRN
Status: DISCONTINUED | OUTPATIENT
Start: 2021-10-12 | End: 2021-10-12 | Stop reason: HOSPADM

## 2021-10-12 RX ORDER — SODIUM CHLORIDE 0.9 % (FLUSH) 0.9 %
10 SYRINGE (ML) INJECTION EVERY 12 HOURS SCHEDULED
Status: DISCONTINUED | OUTPATIENT
Start: 2021-10-12 | End: 2021-10-12 | Stop reason: HOSPADM

## 2021-10-12 RX ORDER — SODIUM CHLORIDE 9 MG/ML
25 INJECTION, SOLUTION INTRAVENOUS PRN
Status: DISCONTINUED | OUTPATIENT
Start: 2021-10-12 | End: 2021-10-12 | Stop reason: HOSPADM

## 2021-10-12 RX ORDER — HYDRALAZINE HYDROCHLORIDE 20 MG/ML
5 INJECTION INTRAMUSCULAR; INTRAVENOUS EVERY 10 MIN PRN
Status: DISCONTINUED | OUTPATIENT
Start: 2021-10-12 | End: 2021-10-12 | Stop reason: HOSPADM

## 2021-10-12 RX ORDER — MORPHINE SULFATE 1 MG/ML
1 INJECTION, SOLUTION EPIDURAL; INTRATHECAL; INTRAVENOUS EVERY 5 MIN PRN
Status: DISCONTINUED | OUTPATIENT
Start: 2021-10-12 | End: 2021-10-12 | Stop reason: HOSPADM

## 2021-10-12 RX ADMIN — PROPOFOL INJECTABLE EMULSION 40 MG: 10 INJECTION, EMULSION INTRAVENOUS at 10:22

## 2021-10-12 RX ADMIN — PROPOFOL INJECTABLE EMULSION 50 MG: 10 INJECTION, EMULSION INTRAVENOUS at 10:21

## 2021-10-12 RX ADMIN — SODIUM CHLORIDE, POTASSIUM CHLORIDE, SODIUM LACTATE AND CALCIUM CHLORIDE: 600; 310; 30; 20 INJECTION, SOLUTION INTRAVENOUS at 10:16

## 2021-10-12 RX ADMIN — LIDOCAINE HYDROCHLORIDE 50 MG: 10 INJECTION, SOLUTION EPIDURAL; INFILTRATION; INTRACAUDAL; PERINEURAL at 10:20

## 2021-10-12 RX ADMIN — PROPOFOL INJECTABLE EMULSION 50 MG: 10 INJECTION, EMULSION INTRAVENOUS at 10:20

## 2021-10-12 ASSESSMENT — PULMONARY FUNCTION TESTS
PIF_VALUE: 1

## 2021-10-12 ASSESSMENT — PAIN - FUNCTIONAL ASSESSMENT: PAIN_FUNCTIONAL_ASSESSMENT: 0-10

## 2021-10-12 NOTE — ANESTHESIA POSTPROCEDURE EVALUATION
POST- ANESTHESIA EVALUATION       Pt Name: Thom Florez  MRN: [de-identified]  YOB: 1990  Date of evaluation: 10/12/2021  Time:  11:35 AM      /71   Pulse 76   Temp 98.2 °F (36.8 °C) (Infrared)   Resp 16   Ht 5' 3\" (1.6 m)   Wt 104 lb 12.8 oz (47.5 kg)   LMP 10/12/2021   SpO2 100%   BMI 18.56 kg/m²      Consciousness Level  Awake  Cardiopulmonary Status  Stable  Pain Adequately Treated YES  Nausea / Vomiting  NO  Adequate Hydration  YES  Anesthesia Related Complications NONE      Electronically signed by Francisco Irizarry MD on 10/12/2021 at 11:35 AM       Department of Anesthesiology  Postprocedure Note    Patient: Thom Florez  MRN: [de-identified]  YOB: 1990  Date of evaluation: 10/12/2021  Time:  11:35 AM     Procedure Summary     Date: 10/12/21 Room / Location: [address]    Anesthesia Start: 5213 Anesthesia Stop: 1030    Procedure: EGD BIOPSY DUODENUM (N/A ) Diagnosis: (K30  DYSPEPSIA)    Surgeons: Donato Jaimes MD Responsible Provider: Francisco Irizarry MD    Anesthesia Type: MAC ASA Status: 2          Anesthesia Type: MAC    Arcadio Phase I: Arcadio Score: 10    Arcadio Phase II: Arcadio Score: 9    Last vitals: Reviewed and per EMR flowsheets.        Anesthesia Post Evaluation

## 2021-10-12 NOTE — OP NOTE
Operative Note      Patient: Joyce Joseph  YOB: 1990  MRN: [id number]    Date of Procedure: 10/12/2021    Pre-Op Diagnosis: K30  DYSPEPSIA    Post-Op Diagnosis: gastritis, moderate biliary reflux       Procedure(s):  EGD BIOPSY DUODENUM    Surgeon(s):  Jhony Dallas MD    Assistant:   * No surgical staff found *    Anesthesia: Monitor Anesthesia Care    Estimated Blood Loss (mL): Minimal    Complications: None    Specimens:   ID Type Source Tests Collected by Time Destination   A : DUODENUM BIOPSY Tissue Duodenum SURGICAL PATHOLOGY Jhony Dallas MD 10/12/2021 1022    B : STOMACH BIOPSY Tissue Stomach SURGICAL PATHOLOGY Jhony Dallas MD 10/12/2021 1023        Implants:  * No implants in log *      Drains: * No LDAs found *          [facility]    [facility]    EGD    PROCEDURE DATE: 10/12/21    REFERRING PHYSICIAN: No ref. provider found     PRIMARY CARE PROVIDER: Favio Mcmullen APRN - CNP    ATTENDING PHYSICIAN: Jhony Dallas MD     HISTORY: Ms. Joyce Joseph is a 27 y.o. female who presents to the  Endoscopy unit for upper endoscopy. The patient's clinical history is remarkable for IBS-D, GB dyskinesia, recurrent dyspepsia, referred for diagnostic EGD. She is currently medically stable and appropriate for the planned procedure. PREOPERATIVE DIAGNOSIS: Dyspepsia. PROCEDURES:   1) Transoral Upper Endoscopy with cold biopsy. POSTOPERATIVE DIAGNOSIS:      1) Normal appearing esophagus and GEJ  2) Mild to moderate biliary reflux observed occupying the gastric body and antrum  3) Mild non-specific erythema in the antrum and gastric body s/p cold biopsy   4) White patchy non-specific duodenal mucosa s/p cold biopsy      MEDICATIONS:   MAC per anesthesia     EBL: <10cc    INSTRUMENT: Olympus GIF-H190  flexible Gastroscope.      PREPARATION: The nature and character of the procedure as well as risks, benefits, and alternatives were discussed with the patient and informed consent was obtained. Complications were said to include, but were not limited to: medication allergy, medication reaction, cardiovascular and respiratory problems, bleeding, perforation, infection, and/or missed diagnosis. Following arrival in the endoscopy room, the patient was placed in the left lateral decubitus position and final time-out accomplished in the presence of the nursing staff. Baseline vital signs were obtained and reviewed, and IV sedation was subsequently initiated. FINDINGS:   Esophagus: The esophagus was inspected to the Z-line. The endoscopic exam showed normal appearing. Stomach: The stomach was inspected in both forward and retroflex fashion and was appropriately distensible. The cardia, fundus, incisura, antrum and pylorus were identified via direct visualization. The endoscopic exam showed mild gastritis. Duodenum: The proximal small bowel was inspected through the bulb, sweep, and second portion of the duodenum. The endoscopic exam showed normal appearing mucosa. IMPRESSION:      1) Normal appearing esophagus and GEJ  2) Mild to moderate biliary reflux observed occupying the gastric body and antrum  3) Mild non-specific erythema in the antrum and gastric body s/p cold biopsy   4) White patchy non-specific duodenal mucosa s/p cold biopsy      RECOMMENDATIONS:   1) Follow up path in GI clinic. May consider medication for biliary reflux        Cara Fierro MD  [facility]   10/12/21    this note is created with the assistance of a speech recognition program.  While intending to generate a document that actually reflects the content of the visit, the document can still have some errors including those of syntax and sound a like substitutions which may escape proof reading. It such instances, actual meaning can be extrapolated by contextual diversion.     The patient was counseled at length about the risks of tila Covid-19 during their perioperative period and any recovery window from their procedure. The patient was made aware that tila Covid-19  may worsen their prognosis for recovering from their procedure  and lend to a higher morbidity and/or mortality risk. All material risks, benefits, and reasonable alternatives including postponing the procedure were discussed. The patient DOES wish to proceed with the procedure at this time.     Electronically signed by Danyel Hull MD on 10/12/2021 at 10:25 AM

## 2021-10-12 NOTE — ANESTHESIA PRE PROCEDURE
Department of Anesthesiology  Preprocedure Note       Name:  Jacqueline Baxter   Age:  27 y.o.  :  1990                                          MRN:  [id number]         Date:  10/12/2021      Surgeon: Chrissy Babcock):  Aman Sandoval MD    Procedure: Procedure(s):  EGD BIOPSY    Medications prior to admission:   Prior to Admission medications    Medication Sig Start Date End Date Taking?  Authorizing Provider   ondansetron (ZOFRAN ODT) 4 MG disintegrating tablet Take 1 tablet by mouth every 8 hours as needed for Nausea or Vomiting 10/4/21   Aman Sandoval MD   DULoxetine (CYMBALTA) 30 MG extended release capsule  21   Historical Provider, MD   verapamil (CALAN) 80 MG tablet Take 1 tablet by mouth nightly 9/15/21 10/15/21  Katelin Ramirez MD   Rimegepant Sulfate (NURTEC) 75 MG TBDP TAKE 1 TABLET AT ONSET OF HEADACHE, NO MORE THAN 4 TABS A WEEK -SPECIAL ORDER PLEASE GIVE 3 DAYS NOTICE 21   Shaw Weber MD   omeprazole (PRILOSEC) 20 MG delayed release capsule Take 1 capsule by mouth 2 times daily 21   Aman Sandoval MD   spironolactone (ALDACTONE) 50 MG tablet TAKE 1 TABLET DAILY 21   RODRIGUE Forte CNP   ondansetron (ZOFRAN) 4 MG tablet Take 1 tablet by mouth every 6 hours as needed for Nausea or Vomiting 21   Rich Horton DO   metoprolol tartrate (LOPRESSOR) 25 MG tablet Take 0.5 tablets by mouth 2 times daily 21   RODRIGUE Forte CNP       Current medications:    Current Facility-Administered Medications   Medication Dose Route Frequency Provider Last Rate Last Admin    0.9 % sodium chloride infusion   IntraVENous Continuous Jerrell Lucas MD        lactated ringers infusion   IntraVENous Continuous Jerrell Lucas MD        sodium chloride flush 0.9 % injection 10 mL  10 mL IntraVENous 2 times per day Jerrell Lucas MD        sodium chloride flush 0.9 % injection 10 mL  10 mL IntraVENous PRN Jerrell Lucas MD        0.9 % sodium chloride infusion  25 mL IntraVENous PRN Jerrell Lucas MD Allergies: Allergies   Allergen Reactions    Indocin [Indomethacin] Nausea And Vomiting    Gabapentin      fever       Problem List:    Patient Active Problem List   Diagnosis Code    Acne vulgaris L70.0    Tachycardia R00.0    Migraine without aura and without status migrainosus, not intractable G43.009    Episodic lightheadedness R42    Irritable bowel syndrome with diarrhea K58.0    Fibromyalgia M79.7    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    Hip pain, left M25.552    Migraine headache G43.909    Palpitations R00.2    Vasovagal syncope R55    Fever of unknown origin (FUO) R50.9    Nausea R11.0    Biliary dyskinesia K82.8    Eosinopenia (HCC) X86.8    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    POTS (postural orthostatic tachycardia syndrome) I49.8       Past Medical History:        Diagnosis Date    Allergic rhinitis     Fibromyalgia     GERD (gastroesophageal reflux disease)     Headache     Neurogenic syncope     Tachycardia        Past Surgical History:        Procedure Laterality Date    CHOLECYSTECTOMY, LAPAROSCOPIC  04/22/2021    CHOLECYSTECTOMY LAPAROSCOPIC ROBOTIC    CHOLECYSTECTOMY, LAPAROSCOPIC N/A 4/22/2021    CHOLECYSTECTOMY LAPAROSCOPIC ROBOTIC performed by Cyril Collins DO at [address] [place] [address]  2013    surgical hip dislocation       Social History:    Social History     Tobacco Use    Smoking status: Never Smoker    Smokeless tobacco: Never Used   Substance Use Topics    Alcohol use: Yes     Comment: social                                Counseling given: Not Answered      Vital Signs (Current): There were no vitals filed for this visit.                                            BP Readings from Last 3 Encounters:   10/04/21 119/86   09/15/21 128/72   07/20/21 115/72       NPO Status: Time of last liquid consumption: 0630                        Time of last solid consumption: 2000                        Date of last liquid consumption: 10/12/21                        Date of last solid food consumption: 10/11/21    BMI:   Wt Readings from Last 3 Encounters:   10/04/21 101 lb (45.8 kg)   09/15/21 113 lb (51.3 kg)   07/20/21 104 lb (47.2 kg)     There is no height or weight on file to calculate BMI.    CBC:   Lab Results   Component Value Date    WBC 5.6 04/12/2021    WBC 5.6 04/12/2021    RBC 4.54 04/12/2021    HGB 13.2 04/12/2021    HCT 39.4 04/12/2021    MCV 86.8 04/12/2021    RDW 13.3 04/12/2021     04/12/2021       CMP:   Lab Results   Component Value Date     03/30/2021    K 4.5 03/30/2021     03/30/2021    CO2 25 03/30/2021    BUN 10 03/30/2021    CREATININE 0.59 03/30/2021    GFRAA >60 03/30/2021    LABGLOM >60 03/30/2021    GLUCOSE 81 03/30/2021    PROT 7.6 03/30/2021    CALCIUM 9.2 03/30/2021    BILITOT 0.16 03/30/2021    ALKPHOS 62 03/30/2021    AST 15 03/30/2021    ALT 9 03/30/2021       POC Tests: No results for input(s): POCGLU, POCNA, POCK, POCCL, POCBUN, POCHEMO, POCHCT in the last 72 hours.     Coags: No results found for: PROTIME, INR, APTT    HCG (If Applicable): Negative 32/55/74  Lab Results   Component Value Date    HCG NEGATIVE 04/22/2021        ABGs: No results found for: PHART, PO2ART, LIC0DQN, ULQ9BUD, BEART, X4DFRVUU     Type & Screen (If Applicable):  No results found for: LABABO, LABRH    Drug/Infectious Status (If Applicable):  No results found for: HIV, HEPCAB    COVID-19 Screening (If Applicable):   Lab Results   Component Value Date    COVID19 Not Detected 04/18/2021    COVID19 Not Detected 11/12/2020           Anesthesia Evaluation  Patient summary reviewed and Nursing notes reviewed no history of anesthetic complications:   Airway: Mallampati: I  TM distance: >3 FB   Neck ROM: full  Mouth opening: > = 3 FB Dental: normal exam         Pulmonary:Negative Pulmonary ROS and normal exam  breath sounds clear to auscultation                             Cardiovascular:Negative CV ROS            Rhythm: regular  Rate: normal                    Neuro/Psych:   (+) neuromuscular disease:, headaches: migraine headaches,             GI/Hepatic/Renal:   (+) GERD: no interval change,          ROS comment: DYSPEPSIA. Endo/Other: Negative Endo/Other ROS                    Abdominal:       Abdomen: soft. Vascular: negative vascular ROS. Other Findings:             Anesthesia Plan      MAC     ASA 2             Anesthetic plan and risks discussed with patient. Plan discussed with CRNA.                   Mateo Locke MD   10/12/2021

## 2021-10-12 NOTE — H&P
Procedure History and Physical    Pre-Procedural Diagnosis:  Dyspepsia    Indications:  same    Procedure Planned: endoscopy     History Obtained From:  patient    HISTORY OF PRESENT ILLNESS:       The patient is a 27 y.o. female who presents for the above procedure.         Past Medical History:    Past Medical History:   Diagnosis Date    Allergic rhinitis     Fibromyalgia     GERD (gastroesophageal reflux disease)     Headache     Neurogenic syncope     Tachycardia        Past Surgical History:    Past Surgical History:   Procedure Laterality Date    CHOLECYSTECTOMY, LAPAROSCOPIC  04/22/2021    CHOLECYSTECTOMY LAPAROSCOPIC ROBOTIC    CHOLECYSTECTOMY, LAPAROSCOPIC N/A 4/22/2021    CHOLECYSTECTOMY LAPAROSCOPIC ROBOTIC performed by Willow Davis DO at [address] [place] [address]  2013    surgical hip dislocation       Medications:  Current Facility-Administered Medications   Medication Dose Route Frequency Provider Last Rate Last Admin    0.9 % sodium chloride infusion   IntraVENous Continuous Jazlyn Hall MD        lactated ringers infusion   IntraVENous Continuous Jazlyn Hall MD        sodium chloride flush 0.9 % injection 10 mL  10 mL IntraVENous 2 times per day Jazlyn Hall MD        sodium chloride flush 0.9 % injection 10 mL  10 mL IntraVENous PRN Jazlyn Hall MD        0.9 % sodium chloride infusion  25 mL IntraVENous PRN Jazlyn Hall MD        meperidine (DEMEROL) injection 12.5 mg  12.5 mg IntraVENous Q5 Min PRN Jazlyn Hall MD        morphine (PF) injection 1 mg  1 mg IntraVENous Q5 Min PRN Jazlyn Hall MD        HYDROmorphone (DILAUDID) injection 0.5 mg  0.5 mg IntraVENous Q5 Min PRFABRICIO Hall MD        fentaNYL (SUBLIMAZE) injection 25 mcg  25 mcg IntraVENous Q5 Min PRFABRICIO Hall MD        HYDROcodone-acetaminophen (NORCO) 5-325 MG per tablet 1 tablet  1 tablet Oral PRFABRICIO Hall MD        Or   [facility] HYDROcodone-acetaminophen (NORCO) 5-325 MG per tablet 2 tablet  2 tablet Oral SKY Hall MD  ondansetron (ZOFRAN) injection 4 mg  4 mg IntraVENous Once PRN Nery Landaverde MD        promethazine [facility]) injection 6.25 mg  6.25 mg IntraMUSCular Once PRN Nery Landaverde MD        diphenhydrAMINE (BENADRYL) injection 12.5 mg  12.5 mg IntraVENous Once PRN Nery Landavedre MD        hydrALAZINE (APRESOLINE) injection 5 mg  5 mg IntraVENous Q10 Min PRN Nery Landaverde MD           Allergies: Allergies   Allergen Reactions    Indocin [Indomethacin] Nausea And Vomiting    Gabapentin      fever                 Social   Social History     Tobacco Use    Smoking status: Never Smoker    Smokeless tobacco: Never Used   Substance Use Topics    Alcohol use: Yes     Comment: social        PSYCH HISTORY:  Depression No  Anxiety No  Suicide No       Family History   Problem Relation Age of Onset    Asthma Mother     Heart Disease Father     High Blood Pressure Father       No family history of colon cancer, Crohn's disease, or ulcerative colitis    Problems with Sedation/Anesthesia in the past? no    REVIEW OF SYSTEMS:  12 point review of systems negative other than mentioned above.       PHYSICAL EXAM:    Vitals:  /85   Pulse 100   Temp 98.2 °F (36.8 °C) (Temporal)   Resp 16   Ht 5' 3\" (1.6 m)   Wt 104 lb 12.8 oz (47.5 kg)   LMP 10/12/2021   SpO2 96%   BMI 18.56 kg/m²     Focused Exam related to procedure:    General appearance: NAD, conversant   Eyes: anicteric sclerae, moist conjunctivae; no lid-lag; PERRLA   Lungs: CTA, with normal respiratory effort and no intercostal retractions   CV: RRR, no MRGs   Abdomen: Soft, non-tender; no masses or HSM   Skin: Normal temperature, turgor and texture; no rash, ulcers or subcutaneous nodules     DATA:  CBC:   Lab Results   Component Value Date    WBC 5.6 04/12/2021    WBC 5.6 04/12/2021    HGB 13.2 04/12/2021    HCT 39.4 04/12/2021    MCV 86.8 04/12/2021     04/12/2021     BUN/Cr:   Lab Results   Component Value Date    BUN 10 03/30/2021   ,   Lab Results   Component Value Date    CREATININE 0.59 03/30/2021     Potassium:   Lab Results   Component Value Date    K 4.5 03/30/2021     PT/INR: No results found for: INR, PROTIME    ASSESSMENT AND PLAN:       1. Patient is a 27 y.o. female with above specified procedure planned. Expected Sedation/Anesthesia Type: MAC    2. ASA ([address] Anesthesiology) Anesthesia Status: Class 2 - A normal healthy patient with mild systemic disease    3. Mallampati: II (soft palate, uvula, fauces visible)  4. Procedure options, risks and benefits reviewed with Patient. Patient expresses understanding.     5.  Consent has been signed:  Yes    Hugo Edwards MD

## 2021-10-14 LAB — SURGICAL PATHOLOGY REPORT: NORMAL

## 2021-10-16 NOTE — RESULT ENCOUNTER NOTE
Please notify patient: Biopsy shows mild gastritis, continue omeprazole 20 mg twice a day  Take all pills with food, avoid spicy foods  Needs appointment with new PCP    Future Appointments  12/2/2021  9:20 AM    Alicia Bell MD         Neuro Spec          [facility]  12/20/2021 4:00 PM    Pippa Milan MD           sv [facility]

## 2021-10-25 RX ORDER — OMEPRAZOLE 40 MG/1
CAPSULE, DELAYED RELEASE ORAL
Qty: 60 CAPSULE | Refills: 2 | Status: SHIPPED | OUTPATIENT
Start: 2021-10-25

## 2021-10-29 DIAGNOSIS — K80.50 BILIARY COLIC SYMPTOM: Primary | ICD-10-CM

## 2021-10-29 RX ORDER — SUCRALFATE 1 G/1
1 TABLET ORAL 4 TIMES DAILY
Qty: 120 TABLET | Refills: 3 | Status: SHIPPED | OUTPATIENT
Start: 2021-10-29 | End: 2022-02-15 | Stop reason: SDUPTHER

## 2021-10-29 NOTE — PROGRESS NOTES
Patient states symptoms are not improving. Daily vomiting. Ordering carafate to add to her regimen for biliary reflux.

## 2021-11-26 ENCOUNTER — TELEPHONE (OUTPATIENT)
Dept: NEUROLOGY | Age: 31
End: 2021-11-26

## 2021-12-02 ENCOUNTER — OFFICE VISIT (OUTPATIENT)
Dept: NEUROLOGY | Age: 31
End: 2021-12-02
Payer: COMMERCIAL

## 2021-12-02 VITALS
SYSTOLIC BLOOD PRESSURE: 110 MMHG | HEIGHT: 63 IN | HEART RATE: 101 BPM | WEIGHT: 107 LBS | BODY MASS INDEX: 18.96 KG/M2 | DIASTOLIC BLOOD PRESSURE: 80 MMHG

## 2021-12-02 DIAGNOSIS — G43.711 CHRONIC MIGRAINE WITHOUT AURA, WITH INTRACTABLE MIGRAINE, SO STATED, WITH STATUS MIGRAINOSUS: Primary | ICD-10-CM

## 2021-12-02 PROCEDURE — G8420 CALC BMI NORM PARAMETERS: HCPCS | Performed by: PSYCHIATRY & NEUROLOGY

## 2021-12-02 PROCEDURE — G8427 DOCREV CUR MEDS BY ELIG CLIN: HCPCS | Performed by: PSYCHIATRY & NEUROLOGY

## 2021-12-02 PROCEDURE — 1036F TOBACCO NON-USER: CPT | Performed by: PSYCHIATRY & NEUROLOGY

## 2021-12-02 PROCEDURE — 99214 OFFICE O/P EST MOD 30 MIN: CPT | Performed by: PSYCHIATRY & NEUROLOGY

## 2021-12-02 PROCEDURE — G8484 FLU IMMUNIZE NO ADMIN: HCPCS | Performed by: PSYCHIATRY & NEUROLOGY

## 2021-12-02 RX ORDER — DULOXETIN HYDROCHLORIDE 60 MG/1
CAPSULE, DELAYED RELEASE ORAL
COMMUNITY
Start: 2021-11-19

## 2021-12-02 RX ORDER — UBROGEPANT 50 MG/1
50 TABLET ORAL PRN
Qty: 30 TABLET | Refills: 1 | Status: SHIPPED | OUTPATIENT
Start: 2021-12-02 | End: 2022-01-01

## 2021-12-02 RX ORDER — ERENUMAB-AOOE 140 MG/ML
140 INJECTION, SOLUTION SUBCUTANEOUS
Qty: 2 PEN | Refills: 3 | Status: SHIPPED | OUTPATIENT
Start: 2021-12-02 | End: 2022-01-31

## 2021-12-02 RX ORDER — TIZANIDINE 4 MG/1
TABLET ORAL
COMMUNITY
Start: 2021-11-21

## 2021-12-02 NOTE — PROGRESS NOTES
[address]  [address], [address] [place] [address]  Ph: [phone] or [phone]  FAX: [phone]    Chief Complaint: headaches     Dear RODRIGUE Mena CNP     I had the pleasure of seeing your patient today in neurology consultation for her symptoms. As you would recall Alesia Moreno is a 27 y.o. female. The history has been obtained from the patient and from the accompanying medical records. The patient was at her baseline until 14years of age when she was diagnosed with migraines. She has 6-7 migraines a week. Her headaches are located mostly on the left side of her head and involve the left eye area. They can also involve the occipital area. They are discribed as sharp stabbing pain and are associated with photophobia, phonophobia and nausea and vomiting. She reports occational tingling and dropping things; these can happen with or without a migraine. She does not have headache free days. Poor sleep, hygiene or processed foods usually cause her migraines. The patient has been on metoprolol. She reports no significant relief in headaches with Botox. She states she has used Nurtec as a rescue medication for her headaches and reports significant improvement with it, she is using it once a week for her symptom. Patient also admits to fatigue and joint pain. Patient reported she had to have her gallbladder removed and developed an allergy to gabapentin after experiencing a prolonged fever (lasting approximately 8 months). She reported experiencing headaches and/or migraines on a daily basis with no relief from the Botox injections. Today, the patient reports that she has taken Aimovig 70 mg/mL for 3 months. She has not taken the verapamil 80 mg nightly due to receiving Aimovig. Patient reports that Henderson Speck has improved her headaches by 60-70%. She continues to experience a breakthrough migraine approximately once every two weeks.  On other days, the patient occasionally experiences a mild headache that is manageable. Since the patient has been denied Nurtec by her insurance, the patient does not currently take an abortive medication for breakthrough headaches. She reports Cymbalta continues to be beneficial for her myalgia. Denies side effects from the medication. The patient reports having difficulties with sleep recently.       Current prophylactic medication Dosage   Cymbalta    Aimovig  70 mg/mL monthly        Previous prophylactic medications Reason for discontinuation   Topomax Causes nausea   amitriptyline Causes brain fog    Botox No significant relief     Previous Abortive medications Reason for discontinuation   Tylenol Did not find relief    Ibuprofen Did not find relief    Imitrex Can not tolerate   Nurtec Denies by insurance             Patient is unable to tolerate triptans    Neurological Workup:  MRI Brain W WO Contrast on 02/20/2021: Normal MR brain      REVIEW OF SYSTEMS   Constitutional Weight: present, Appetite: present (no appetite), Fatigue: present   HEENT Visual disturbance: present, Ears: pain and ringing   Respiratory Shortness of breath: absent, Cough: absent   Cardiovascular Chest pain: absent, Leg swelling :absent   GI Constipation: absent, Diarrhea: present, Swallowing change: absent    Urinary frequency: absent, Urinary urgency: absent,    Musculoskeletal Neck pain: present, Back pain: present, Stiffness: present, Muscle pain: present, Joint pain: present   Dermatological Hair loss: absent, Skin changes: absent   Neurological Memory loss: present, Confusion: absent, Seizures: absent, Trouble walking or imbalance: present, Dizziness: present, Weakness: present, Numbness: present Tremor: absent Spasm: absent, Speech difficulty: absent, Headache: present, Light sensitivity: present   Psychiatric Anxiety: absent, Hallucination: absent, Depression, absent   Hematologic Abnormal bleeding/Bruising: absent, Anemia: absent Neurological work up:  CT head  CTA head and neck  MRI brain   2 D echo     Past Medical History:   Diagnosis Date    Allergic rhinitis     Fibromyalgia     GERD (gastroesophageal reflux disease)     Headache     Neurogenic syncope     Tachycardia      Past Surgical History:   Procedure Laterality Date    CHOLECYSTECTOMY, LAPAROSCOPIC  04/22/2021    CHOLECYSTECTOMY LAPAROSCOPIC ROBOTIC    CHOLECYSTECTOMY, LAPAROSCOPIC N/A 4/22/2021    CHOLECYSTECTOMY LAPAROSCOPIC ROBOTIC performed by Dara Olszewski, DO at [facility]  2013    surgical hip dislocation    UPPER GASTROINTESTINAL ENDOSCOPY  10/12/2021    UPPER GASTROINTESTINAL ENDOSCOPY N/A 10/12/2021    EGD BIOPSY DUODENUM performed by Norberto Severs, MD at [facility]   Allergen Reactions    Indocin [Indomethacin] Nausea And Vomiting    Gabapentin      fever     Family History   Problem Relation Age of Onset    Asthma Mother     Heart Disease Father     High Blood Pressure Father       Social History     Socioeconomic History    Marital status: Single     Spouse name: Not on file    Number of children: Not on file    Years of education: Not on file    Highest education level: Not on file   Occupational History    Not on file   Tobacco Use    Smoking status: Never Smoker    Smokeless tobacco: Never Used   Vaping Use    Vaping Use: Never used   Substance and Sexual Activity    Alcohol use: Yes     Comment: social    Drug use: No    Sexual activity: Never   Other Topics Concern    Not on file   Social History Narrative    Not on file     Social Determinants of Health     Financial Resource Strain:     Difficulty of Paying Living Expenses: Not on file   Food Insecurity:     Worried About Running Out of Food in the Last Year: Not on file    Paula of Food in the Last Year: Not on file   Transportation Needs:     Lack of Transportation (Medical):  Not on file    Lack of Transportation (Non-Medical): Not on file   Physical Activity:     Days of Exercise per Week: Not on file    Minutes of Exercise per Session: Not on file   Stress:     Feeling of Stress : Not on file   Social Connections:     Frequency of Communication with Friends and Family: Not on file    Frequency of Social Gatherings with Friends and Family: Not on file    Attends Mandaen Services: Not on file    Active Member of [address] [place] [address] or Organizations: Not on file    Attends Club or Organization Meetings: Not on file    Marital Status: Not on file   Intimate Partner Violence:     Fear of Current or Ex-Partner: Not on file    Emotionally Abused: Not on file    Physically Abused: Not on file    Sexually Abused: Not on file   Housing Stability:     Unable to Pay for Housing in the Last Year: Not on file    Number of [place] in the Last Year: Not on file    Unstable Housing in the Last Year: Not on file      There were no vitals taken for this visit. Physical examination:  General appearance: Normal. Well groomed.  In no acute distress    Head: Normocephalic, atraumatic  Eyes: Extraocular movements intact, eye lids normal  Lungs: Respirations unlabored, chest wall no deformity  ENT: Normal external ear canals, no sinus tenderness  Heart: Regular rate rhythm  Abdomen: No masses, tenderness  Extremities: No cyanosis or edema, 2+ pulses  Musculoskeletal: Normal range of motion in all joints  Skin: Intact, normal skin color    Neurological examination:    Mental status   Alert and oriented; intact memory with no confusion, speech or language problems; no hallucinations or delusions     Cranial nerves   II - visual fields intact to confrontation                                                III, IV, VI - extra-ocular muscles full: no pupillary defect; no TASHA, no nystagmus, no ptosis   V - normal facial sensation                                                               VII - normal facial symmetry VIII - intact hearing                                                                             IX, X - symmetrical palate                                                                  XI - symmetrical shoulder shrug                                                       XII - midline tongue without atrophy or fasciculation     Motor function  Normal muscle bulk and tone; normal power 5/5, including fine motor movements     Sensory function Intact to touch, pin, vibration, proprioception     Cerebellar Intact fine motor movement. No involuntary movements or tremors     Reflex function Intact 2+ DTR and symmetric. Negative Babinski     Gait                  Normal station and gait       Lab Results   Component Value Date    LDLCALC 126 2018     No components found for: CHLPL  Lab Results   Component Value Date    TRIG 49 2018     Lab Results   Component Value Date    HDL 71 (A) 2018     Lab Results   Component Value Date    LDLCALC 126 2018     MRI Brain W WO Contrast on 2021: Normal MR brain     All of patient's labs were personally reviewed. All the imaging studies were personally reviewed and discussed with the patient. Assessment Recommendations:  Chronic migraine WO Aura, intractable, W Status Migrainosus    The patient has good success with Aimovig 70 mg/mL monthly injections. She continues to experience a breakthrough headache approximately once every 2 weeks. I recommend she increase Aimovig to 140 mg/mL for improved headache prophylaxis. The patient is unable to tolerate triptans due to chest pain and palpitations. Therefore, I recommend the patient initiate Ubrelvy 50 mg as needed for headache . Due to insurance complications, the patient has previously been denied Aimovig and [facility] [place]. I plan to work with the insurance company to approve the patient.     The patient also has recently been started on Cymbalta primarily for

## 2021-12-06 ENCOUNTER — PATIENT MESSAGE (OUTPATIENT)
Dept: GASTROENTEROLOGY | Age: 31
End: 2021-12-06

## 2021-12-06 RX ORDER — PROMETHAZINE HYDROCHLORIDE 12.5 MG/1
12.5 TABLET ORAL 4 TIMES DAILY PRN
Qty: 60 TABLET | Refills: 0 | Status: SHIPPED | OUTPATIENT
Start: 2021-12-06 | End: 2021-12-13

## 2021-12-13 DIAGNOSIS — R11.2 NAUSEA AND VOMITING, INTRACTABILITY OF VOMITING NOT SPECIFIED, UNSPECIFIED VOMITING TYPE: Primary | ICD-10-CM

## 2021-12-13 RX ORDER — METOCLOPRAMIDE 5 MG/1
5 TABLET ORAL 3 TIMES DAILY PRN
Qty: 120 TABLET | Refills: 3 | Status: SHIPPED | OUTPATIENT
Start: 2021-12-13 | End: 2021-12-20 | Stop reason: ALTCHOICE

## 2021-12-15 NOTE — TELEPHONE ENCOUNTER
Called patient. No answer. LVM informing patient of new medication and lab orders. Reviewed side effects patient should be looking out for while taking reglan and when/if it should be stopped. Advised patient to contact office with any questions.

## 2021-12-17 ENCOUNTER — HOSPITAL ENCOUNTER (OUTPATIENT)
Age: 31
Setting detail: SPECIMEN
Discharge: HOME | End: 2021-12-17

## 2021-12-17 DIAGNOSIS — R11.2 NAUSEA AND VOMITING, INTRACTABILITY OF VOMITING NOT SPECIFIED, UNSPECIFIED VOMITING TYPE: ICD-10-CM

## 2021-12-17 LAB
ALBUMIN SERPL-MCNC: 4.8 G/DL (ref 3.5–5.2)
ALBUMIN/GLOBULIN RATIO: 1.7 (ref 1–2.5)
ALP BLD-CCNC: 82 U/L (ref 35–104)
ALT SERPL-CCNC: 11 U/L (ref 5–33)
AST SERPL-CCNC: 17 U/L
BILIRUB SERPL-MCNC: 0.29 MG/DL (ref 0.3–1.2)
BILIRUBIN DIRECT: <0.08 MG/DL
BILIRUBIN, INDIRECT: ABNORMAL MG/DL (ref 0–1)
GLOBULIN: ABNORMAL G/DL (ref 1.5–3.8)
LIPASE: 26 U/L (ref 13–60)
TOTAL PROTEIN: 7.6 G/DL (ref 6.4–8.3)

## 2021-12-19 NOTE — RESULT ENCOUNTER NOTE
Kya comment sent to patient. normal labs  Future Appointments  12/20/2021 4:00 PM    Danyel Hull MD           [facility]  3/14/2022  9:00 AM    Esteban Jhaveri MD         Neuro Spec          [facility]

## 2021-12-20 ENCOUNTER — OFFICE VISIT (OUTPATIENT)
Dept: GASTROENTEROLOGY | Age: 31
End: 2021-12-20
Payer: COMMERCIAL

## 2021-12-20 VITALS
WEIGHT: 108 LBS | DIASTOLIC BLOOD PRESSURE: 82 MMHG | BODY MASS INDEX: 19.13 KG/M2 | SYSTOLIC BLOOD PRESSURE: 132 MMHG | HEART RATE: 124 BPM

## 2021-12-20 DIAGNOSIS — K29.70 GASTRITIS WITHOUT BLEEDING, UNSPECIFIED CHRONICITY, UNSPECIFIED GASTRITIS TYPE: ICD-10-CM

## 2021-12-20 DIAGNOSIS — K82.8 BILIARY DYSKINESIA: Primary | ICD-10-CM

## 2021-12-20 PROCEDURE — G8427 DOCREV CUR MEDS BY ELIG CLIN: HCPCS | Performed by: INTERNAL MEDICINE

## 2021-12-20 PROCEDURE — G8420 CALC BMI NORM PARAMETERS: HCPCS | Performed by: INTERNAL MEDICINE

## 2021-12-20 PROCEDURE — G8484 FLU IMMUNIZE NO ADMIN: HCPCS | Performed by: INTERNAL MEDICINE

## 2021-12-20 PROCEDURE — 1036F TOBACCO NON-USER: CPT | Performed by: INTERNAL MEDICINE

## 2021-12-20 PROCEDURE — 99213 OFFICE O/P EST LOW 20 MIN: CPT | Performed by: INTERNAL MEDICINE

## 2021-12-20 RX ORDER — BACLOFEN 10 MG/1
10 TABLET ORAL 3 TIMES DAILY
Qty: 30 TABLET | Refills: 0 | Status: SHIPPED | OUTPATIENT
Start: 2021-12-20

## 2021-12-20 ASSESSMENT — ENCOUNTER SYMPTOMS
NAUSEA: 1
DIARRHEA: 1
ABDOMINAL DISTENTION: 1
BACK PAIN: 0
ABDOMINAL PAIN: 1
VOMITING: 1
CONSTIPATION: 0
BLOOD IN STOOL: 0
ALLERGIC/IMMUNOLOGIC NEGATIVE: 1
EYES NEGATIVE: 1
RESPIRATORY NEGATIVE: 1

## 2021-12-20 NOTE — PROGRESS NOTES
GI FOLLOW UP    INTERVAL HISTORY:       Ongoing symptoms of postprandial nausea and emesis  Only modest improvement with the use of Reglan 5 mg  Taking Prilosec 40 mg twice daily, improved  Underwent upper endoscopy which failed to disclose any obvious structural cause of the patient's symptoms, mild biliary reflux was observed      Chief Complaint   Patient presents with    Follow-up     nausea follow up     Gastroesophageal Reflux     Taking prilosec 40mg BID. Still has heartburn- not as bad as before.  Nausea     Patient feeling nauseous daily. States vomiting any time she tries to eat food or drinks a lot of water.  Other     Patient states she eats and then notices feeling a constricting feeling at the lower part of her esophagus. 1. Biliary dyskinesia    2. Gastritis without bleeding, unspecified chronicity, unspecified gastritis type          HISTORY OF PRESENT ILLNESS: Ms.Courtney MAYELA Balbuena is a 31 y.o. female with a past history remarkable for persistent nausea, daily symptoms, intermittent vomiting, referred for evaluation of chronic GERD and dyspepsia     Mild dyspepsia symptoms.  However the symptoms have been subacute to chronic for several years now.  Patient reports that her symptoms are often associated with persistent nausea without any episodes of emesis.  She seen modest amount of relief with Zofran and Prilosec.  She was referred to GI to further evaluate her persistent upper GI symptoms.     Smoker: None   Drinking history: Socially  Illicit drugs: none   Abdominal surgeries: None   Prior Colonoscopy: None   Prior EGD: > 5 yrs ago--- per patient, mild gastritis.   FH of GI issues: none     Past Medical,Family, and Social History reviewed and does contribute to the patient presenting condition.     Patient's PMH/PSH,SH,PSYCH Hx, MEDs, ALLERGIES, and ROS were all reviewed and updated in the appropriate sections.     PAST MEDICAL HISTORY:  Past Medical History:   Diagnosis Date    Allergic rhinitis     Fibromyalgia     GERD (gastroesophageal reflux disease)     Headache     Neurogenic syncope     Tachycardia        Past Surgical History:   Procedure Laterality Date    CHOLECYSTECTOMY, LAPAROSCOPIC  04/22/2021    CHOLECYSTECTOMY LAPAROSCOPIC ROBOTIC    CHOLECYSTECTOMY, LAPAROSCOPIC N/A 4/22/2021    CHOLECYSTECTOMY LAPAROSCOPIC ROBOTIC performed by Heath Pruitt DO at [facility]  2013    surgical hip dislocation    UPPER GASTROINTESTINAL ENDOSCOPY  10/12/2021    UPPER GASTROINTESTINAL ENDOSCOPY N/A 10/12/2021    EGD BIOPSY DUODENUM performed by Lorenzo Singleton MD at [address]:    Current Outpatient Medications:     metoclopramide (REGLAN) 5 MG tablet, Take 1 tablet by mouth 3 times daily as needed (nausea and emesis), Disp: 120 tablet, Rfl: 3    DULoxetine (CYMBALTA) 60 MG extended release capsule, , Disp: , Rfl:     tiZANidine (ZANAFLEX) 4 MG tablet, , Disp: , Rfl:     Erenumab-aooe (AIMOVIG) 140 MG/ML SOAJ, Inject 140 mg into the skin every 30 days, Disp: 2 pen, Rfl: 3    Ubrogepant (UBRELVY) 50 MG TABS, Take 50 mg by mouth as needed (take at onset of headache.), Disp: 30 tablet, Rfl: 1    sucralfate (CARAFATE) 1 GM tablet, Take 1 tablet by mouth 4 times daily, Disp: 120 tablet, Rfl: 3    omeprazole (PRILOSEC) 40 MG delayed release capsule, TAKE 1 CAPSULE BY MOUTH TWICE A DAY, Disp: 60 capsule, Rfl: 2    Erenumab-aooe (AIMOVIG) 70 MG/ML SOAJ, Inject into the skin every 30 days, Disp: , Rfl:     ondansetron (ZOFRAN ODT) 4 MG disintegrating tablet, Take 1 tablet by mouth every 8 hours as needed for Nausea or Vomiting, Disp: 30 tablet, Rfl: 0    verapamil (CALAN) 80 MG tablet, Take 1 tablet by mouth nightly (Patient not taking: Reported on 12/2/2021), Disp: 30 tablet, Rfl: 2    Rimegepant Sulfate (NURTEC) 75 MG TBDP, TAKE 1 TABLET AT ONSET OF HEADACHE, NO MORE THAN 4 TABS A WEEK -SPECIAL ORDER PLEASE GIVE 3 DAYS NOTICE, Disp: 8 tablet, Rfl: 0    omeprazole (PRILOSEC) 20 MG delayed release capsule, Take 1 capsule by mouth 2 times daily (Patient not taking: Reported on 12/2/2021), Disp: 60 capsule, Rfl: 3    spironolactone (ALDACTONE) 50 MG tablet, TAKE 1 TABLET DAILY, Disp: 30 tablet, Rfl: 5    ondansetron (ZOFRAN) 4 MG tablet, Take 1 tablet by mouth every 6 hours as needed for Nausea or Vomiting, Disp: 20 tablet, Rfl: 0    metoprolol tartrate (LOPRESSOR) 25 MG tablet, Take 0.5 tablets by mouth 2 times daily, Disp: 30 tablet, Rfl: 5    ALLERGIES:   Allergies   Allergen Reactions    Indocin [Indomethacin] Nausea And Vomiting    Gabapentin      fever       FAMILY HISTORY:       Problem Relation Age of Onset    Asthma Mother     Heart Disease Father     High Blood Pressure Father          SOCIAL HISTORY:   Social History     Socioeconomic History    Marital status: Single     Spouse name: Not on file    Number of children: Not on file    Years of education: Not on file    Highest education level: Not on file   Occupational History    Not on file   Tobacco Use    Smoking status: Never Smoker    Smokeless tobacco: Never Used   Vaping Use    Vaping Use: Never used   Substance and Sexual Activity    Alcohol use: Yes     Comment: social    Drug use: No    Sexual activity: Never   Other Topics Concern    Not on file   Social History Narrative    Not on file     Social Determinants of Health     Financial Resource Strain:     Difficulty of Paying Living Expenses: Not on file   Food Insecurity:     Worried About Running Out of Food in the Last Year: Not on file    Paula of Food in the Last Year: Not on file   Transportation Needs:     Lack of Transportation (Medical): Not on file    Lack of Transportation (Non-Medical):  Not on file   Physical Activity:     Days of Exercise per Week: Not on file   Connequity of Exercise per Session: Not on file   Stress:     Feeling of Stress : Not on file   Social Connections:     Frequency of Communication with Friends and Family: Not on file    Frequency of Social Gatherings with Friends and Family: Not on file    Attends [employer] Services: Not on file    Active Member of Clubs or Organizations: Not on file    Attends Club or Organization Meetings: Not on file    Marital Status: Not on file   Intimate Partner Violence:     Fear of Current or Ex-Partner: Not on file    Emotionally Abused: Not on file    Physically Abused: Not on file    Sexually Abused: Not on file   Housing Stability:     Unable to Pay for Housing in the Last Year: Not on file    Number of [place] in the Last Year: Not on file    Unstable Housing in the Last Year: Not on file       REVIEW OF SYSTEMS: A 12-point review of systems was obtained and pertinent positives and negatives were listed below. REVIEW OF SYSTEMS:     Constitutional: No fever, no chills, no lethargy, no weakness. HEENT:  No headache, otalgia, itchy eyes, nasal discharge or sore throat. Cardiac:  No chest pain, dyspnea, orthopnea or PND. Chest:   No cough, phlegm or wheezing. Abdomen:      Detailed by MA   Neuro:  No focal weakness, abnormal movements or seizure like activity. Skin:   No rashes, no itching. :   No hematuria, no pyuria, no dysuria, no flank pain. Extremities:  No swelling or joint pains. ROS was otherwise negative    Review of Systems   Constitutional: Positive for appetite change and unexpected weight change. HENT: Negative. Eyes: Negative. Respiratory: Negative. Cardiovascular: Negative. Gastrointestinal: Positive for abdominal distention, abdominal pain, diarrhea, nausea and vomiting. Negative for blood in stool and constipation. Endocrine: Negative. Genitourinary: Negative. Musculoskeletal: Positive for myalgias. Negative for back pain and gait problem. Skin: Negative. Allergic/Immunologic: Negative. Neurological: Positive for dizziness, weakness, light-headedness and headaches. Hematological: Negative. Psychiatric/Behavioral: Negative. All other systems reviewed and are negative. PHYSICAL EXAMINATION: Vital signs reviewed per the nursing documentation. /82   Pulse 124   Wt 108 lb (49 kg)   BMI 19.13 kg/m²   Body mass index is 19.13 kg/m². Physical Exam    Physical Exam   Constitutional: Patient is oriented to person, place, and time. Patient appears well-developed and well-nourished. HENT:   Head: Normocephalic and atraumatic. Eyes: Pupils are equal, round, and reactive to light. EOM are normal.   Neck: Normal range of motion. Neck supple. No JVD present. No tracheal deviation present. No thyromegaly present. Cardiovascular: Normal rate, regular rhythm, normal heart sounds and intact distal pulses. Pulmonary/Chest: Effort normal and breath sounds normal. No stridor. No respiratory distress. He has no wheezes. He has no rales. He exhibits no tenderness. Abdominal: Soft. Bowel sounds are normal. He exhibits no distension and no mass. There is no tenderness. There is no rebound and no guarding. No hernia. Musculoskeletal: Normal range of motion. Lymphadenopathy:    Patient has no cervical adenopathy. Neurological: Patient is alert and oriented to person, place, and time. Psychiatric: Patient has a normal mood and affect.  Patient behavior is normal.       LABORATORY DATA: Reviewed  Lab Results   Component Value Date    WBC 5.6 04/12/2021    WBC 5.6 04/12/2021    HGB 13.2 04/12/2021    HCT 39.4 04/12/2021    MCV 86.8 04/12/2021     04/12/2021     03/30/2021    K 4.5 03/30/2021     03/30/2021    CO2 25 03/30/2021    BUN 10 03/30/2021    CREATININE 0.59 03/30/2021    LABALBU 4.8 12/17/2021    BILITOT 0.29 (L) 12/17/2021    ALKPHOS 82 12/17/2021    AST 17 12/17/2021    ALT 11 12/17/2021         Lab Results   Component Value Date    RBC 4.54 04/12/2021    HGB 13.2 04/12/2021    MCV 86.8 04/12/2021    MCH 29.1 04/12/2021    MCHC 33.5 04/12/2021    RDW 13.3 04/12/2021    MPV 10.2 04/12/2021    BASOPCT 1 04/12/2021    LYMPHSABS 1.51 04/12/2021    MONOSABS 0.26 04/12/2021    NEUTROABS 3.65 04/12/2021    EOSABS 78 (L) 04/12/2021    EOSABS 0.08 04/12/2021    BASOSABS 0.06 04/12/2021         DIAGNOSTIC TESTING:     No results found. IMPRESSION: Marisa Rodriguez a 30 y.o. female with a past history remarkable for persistent nausea, daily symptoms, intermittent vomiting, referred for evaluation of chronic GERD and dyspepsia        Patient underwent HIDA scan abnormal gallbladder ejection fraction of 25% to suggest that biliary dyskinesia on March 23, 2021-patient underwent laparoscopic cholecystectomy and is noted significant improvement in the patient's upper GI symptoms which include chronic nausea.     Operation performed by Dr. Opal Castillo with a successful postoperative course  Mild persistent trace evidence of nausea that appears to be natural course of patient's operation     Currently with refractory symptoms of nausea and dyspepsia. Food aversion noted related to persistent nausea and abdominal pain    Upper endoscopy was performed which will disclose any structural cause of the patient symptoms    Patient reported as some midsternal pressure sensation after eating which may suggest esophageal dysmotility      Assessment  1. Biliary dyskinesia    2. Gastritis without bleeding, unspecified chronicity, unspecified gastritis type        PLAN:    1) possible esophageal dysmotility based on patient's clinical history-we will obtain UGI series, may consider esophageal motility testing. GERD symptoms appear to be well controlled. Patient has significant amount of postprandial dyspepsia, nausea and emesis. Mild food aversion. Modest response to PPI therapy twice daily.     2) possible reflux hypersensitivity-we will provide baclofen as needed. Patient to have trial Benadryl at nighttime as alternative for antiemetic. She may require referral to a tertiary center for functional GI disorders NOS. 3) postprandial satiety and dyspepsia-no significant response with Reglan 5 mg 3 times daily, will discontinue. Emptying to be considered in the future. Thank you for allowing me to participate in the care of Ms. Balbuena. For any further questions please do not hesitate to contact me. I have reviewed and agree with the ROS entered by the MA/LPN from today's encounter documented in a separate note. Kinjal Cornell MD, MPH   [facility]  Office #: [phone]    this note is created with the assistance of a speech recognition program.  While intending to generate a document that actually reflects the content of the visit, the document can still have some errors including those of syntax and sound a like substitutions which may escape proof reading. It such instances, actual meaning can be extrapolated by contextual diversion.

## 2021-12-23 ENCOUNTER — HOSPITAL ENCOUNTER (OUTPATIENT)
Dept: GENERAL RADIOLOGY | Age: 31
Discharge: HOME | End: 2021-12-25
Payer: COMMERCIAL

## 2021-12-23 DIAGNOSIS — K82.8 BILIARY DYSKINESIA: ICD-10-CM

## 2021-12-23 DIAGNOSIS — K29.70 GASTRITIS WITHOUT BLEEDING, UNSPECIFIED CHRONICITY, UNSPECIFIED GASTRITIS TYPE: ICD-10-CM

## 2021-12-23 PROCEDURE — 74240 X-RAY XM UPR GI TRC 1CNTRST: CPT

## 2021-12-23 PROCEDURE — 2500000003 HC RX 250 WO HCPCS: Performed by: INTERNAL MEDICINE

## 2021-12-23 RX ADMIN — BARIUM SULFATE 176 ML: 960 POWDER, FOR SUSPENSION ORAL at 13:37

## 2021-12-23 RX ADMIN — BARIUM SULFATE 135 ML: 980 POWDER, FOR SUSPENSION ORAL at 13:38

## 2021-12-24 NOTE — RESULT ENCOUNTER NOTE
Management per GI    Future Appointments  3/14/2022  9:00 AM    Pam Mills MD         Neuro Spec          [facility]  3/25/2022  8:30 AM    Aliya Coombs MD           sv gr lks           [facility]

## 2021-12-29 ENCOUNTER — PATIENT MESSAGE (OUTPATIENT)
Dept: GASTROENTEROLOGY | Age: 31
End: 2021-12-29

## 2022-01-03 DIAGNOSIS — R11.2 NAUSEA AND VOMITING, INTRACTABILITY OF VOMITING NOT SPECIFIED, UNSPECIFIED VOMITING TYPE: Primary | ICD-10-CM

## 2022-01-05 ENCOUNTER — TELEPHONE (OUTPATIENT)
Dept: NEUROLOGY | Age: 32
End: 2022-01-05

## 2022-01-11 ENCOUNTER — CLINICAL DOCUMENTATION (OUTPATIENT)
Dept: GASTROENTEROLOGY | Age: 32
End: 2022-01-11

## 2022-01-11 ENCOUNTER — TELEPHONE (OUTPATIENT)
Dept: NEUROLOGY | Age: 32
End: 2022-01-11

## 2022-01-11 ENCOUNTER — HOSPITAL ENCOUNTER (OUTPATIENT)
Dept: NUCLEAR MEDICINE | Age: 32
Discharge: HOME | End: 2022-01-13
Payer: COMMERCIAL

## 2022-01-11 DIAGNOSIS — R11.2 NAUSEA AND VOMITING, INTRACTABILITY OF VOMITING NOT SPECIFIED, UNSPECIFIED VOMITING TYPE: ICD-10-CM

## 2022-01-11 PROCEDURE — 3430000000 HC RX DIAGNOSTIC RADIOPHARMACEUTICAL: Performed by: INTERNAL MEDICINE

## 2022-01-11 PROCEDURE — A9541 TC99M SULFUR COLLOID: HCPCS | Performed by: INTERNAL MEDICINE

## 2022-01-11 PROCEDURE — 78264 GASTRIC EMPTYING IMG STUDY: CPT

## 2022-01-11 RX ORDER — TC 99M MEDRONATE 20 MG/10ML
24 INJECTION, POWDER, LYOPHILIZED, FOR SOLUTION INTRAVENOUS
Status: DISCONTINUED | OUTPATIENT
Start: 2022-01-11 | End: 2022-01-14 | Stop reason: HOSPADM

## 2022-01-11 RX ADMIN — Medication 2.9 MILLICURIE: at 09:35

## 2022-01-11 NOTE — PROGRESS NOTES
Called patient. Informed of severe gastroparesis, recommended gastroparetic diet. May need prokinetic therapy, discuss in clinic.

## 2022-01-11 NOTE — RESULT ENCOUNTER NOTE
Noted severely delayed gastric emptying that means gastroparesis, to call the GI specialist and to make an earlier appointment for treatment    Future Appointments  3/14/2022  9:00 AM    Macey Putnam MD         Neuro Spec          [address] [place] [address]  3/25/2022  8:30 AM    Ramón Gonzales MD           sv gr lks           [facility]

## 2022-01-11 NOTE — TELEPHONE ENCOUNTER
Started Aimovig 140 mg. PA on Cover My Meds and submitted. also I called pharmacy and they tried to run [place] as it was approved. They said it is coming back with an error. It doesn't show needs PA but there is an error code. I called and hilda for [place] asking her to call her insurance company. Insurance approved the Aimovig 140 mg. L:M for pt.

## 2022-01-14 ENCOUNTER — VIRTUAL VISIT (OUTPATIENT)
Dept: GASTROENTEROLOGY | Age: 32
End: 2022-01-14
Payer: COMMERCIAL

## 2022-01-14 DIAGNOSIS — K31.84 GASTROPARESIS: ICD-10-CM

## 2022-01-14 DIAGNOSIS — K82.8 BILIARY DYSKINESIA: Primary | ICD-10-CM

## 2022-01-14 PROCEDURE — 1036F TOBACCO NON-USER: CPT | Performed by: INTERNAL MEDICINE

## 2022-01-14 PROCEDURE — G8484 FLU IMMUNIZE NO ADMIN: HCPCS | Performed by: INTERNAL MEDICINE

## 2022-01-14 PROCEDURE — G8420 CALC BMI NORM PARAMETERS: HCPCS | Performed by: INTERNAL MEDICINE

## 2022-01-14 PROCEDURE — G8427 DOCREV CUR MEDS BY ELIG CLIN: HCPCS | Performed by: INTERNAL MEDICINE

## 2022-01-14 PROCEDURE — 99212 OFFICE O/P EST SF 10 MIN: CPT | Performed by: INTERNAL MEDICINE

## 2022-01-14 RX ORDER — METOCLOPRAMIDE 5 MG/1
5 TABLET ORAL 4 TIMES DAILY
Qty: 120 TABLET | Refills: 3 | Status: SHIPPED | OUTPATIENT
Start: 2022-01-14 | End: 2022-03-14

## 2022-01-14 ASSESSMENT — ENCOUNTER SYMPTOMS
ABDOMINAL PAIN: 1
NAUSEA: 1
ALLERGIC/IMMUNOLOGIC NEGATIVE: 1
VOMITING: 1
EYES NEGATIVE: 1
DIARRHEA: 1
CONSTIPATION: 0
BLOOD IN STOOL: 0
BACK PAIN: 0
ABDOMINAL DISTENTION: 1
RESPIRATORY NEGATIVE: 1

## 2022-01-14 NOTE — PROGRESS NOTES
Review of Systems   Constitutional: Positive for appetite change and unexpected weight change. HENT: Negative. Eyes: Negative. Respiratory: Negative. Cardiovascular: Negative. Gastrointestinal: Positive for abdominal distention, abdominal pain, diarrhea, nausea and vomiting. Negative for blood in stool and constipation. Endocrine: Negative. Genitourinary: Negative. Musculoskeletal: Positive for myalgias. Negative for back pain and gait problem. Skin: Negative. Allergic/Immunologic: Negative. Neurological: Positive for dizziness, weakness, light-headedness and headaches. Hematological: Negative. Psychiatric/Behavioral: Negative. All other systems reviewed and are negative.

## 2022-01-14 NOTE — PROGRESS NOTES
VIRTUAL VISIT:      Stephanie Escalante is a 27 y.o. female evaluated via telephone on 1/14/2022. Consent:  She and/or health care decision maker is aware that that she may receive a bill for this telephone service, depending on her insurance coverage, and has provided verbal consent to proceed: Yes    Agree with ROS as documented and detailed by MA/LPN in separate note from today's encounter     Documentation:  I communicated with the patient and/or health care decision maker about her GI issues. Details of this discussion including any medical advice provided:     Identified to have gastroparesis and emptying test  Will provide Reglan, risk benefits, alternatives discussed thoroughly with the patient. She agrees to proceed with medication. Will monitor response after 1 week  Patient may reduce Prilosec to 40 mg daily  Discontinue baclofen. Follow-up as a virtual in 1 to 2 weeks. I affirm this is a Patient Initiated Episode with an Established Patient who has not had a related appointment within my department in the past 7 days or scheduled within the next 24 hours. Total Time: minutes: 11-20 minutes    Stephanie Escalante is a 27 y.o. female being evaluated by a Virtual Visit (telephone visit) encounter to address concerns as mentioned above. A caregiver was present when appropriate. Due to this being a TeleHealth encounter (During XXSIY-63 public health emergency), evaluation of the following organ systems was limited: Vitals/Constitutional/EENT/Resp/CV/GI//MS/Neuro/Skin/Heme-Lymph-Imm. Pursuant to the emergency declaration under the [address] [place] [address], [address] [place] [address] authority and the [employer] and [employer] Act, this Virtual Visit was conducted with patient's (and/or legal guardian's) consent, to reduce the patient's risk of exposure to COVID-19 and provide necessary medical care.   The patient (and/or legal guardian) has also been advised to contact this office for worsening conditions or problems, and seek emergency medical treatment and/or call 911 if deemed necessary. Services were provided through a telephone synchronous discussion virtually to substitute for in-person clinic visit. Patient and provider were located at their individual homes. --Boris Albright MD on 1/14/2022 at 2:38 PM    An electronic signature was used to authenticate this note.        Boris Albright MD MD  [facility]

## 2022-02-01 ENCOUNTER — PATIENT MESSAGE (OUTPATIENT)
Dept: GASTROENTEROLOGY | Age: 32
End: 2022-02-01

## 2022-02-02 ENCOUNTER — VIRTUAL VISIT (OUTPATIENT)
Dept: GASTROENTEROLOGY | Age: 32
End: 2022-02-02
Payer: COMMERCIAL

## 2022-02-02 DIAGNOSIS — K31.84 GASTROPARESIS: ICD-10-CM

## 2022-02-02 DIAGNOSIS — K82.8 BILIARY DYSKINESIA: Primary | ICD-10-CM

## 2022-02-02 PROCEDURE — G8427 DOCREV CUR MEDS BY ELIG CLIN: HCPCS | Performed by: INTERNAL MEDICINE

## 2022-02-02 PROCEDURE — 99213 OFFICE O/P EST LOW 20 MIN: CPT | Performed by: INTERNAL MEDICINE

## 2022-02-02 PROCEDURE — 1036F TOBACCO NON-USER: CPT | Performed by: INTERNAL MEDICINE

## 2022-02-02 PROCEDURE — G8484 FLU IMMUNIZE NO ADMIN: HCPCS | Performed by: INTERNAL MEDICINE

## 2022-02-02 PROCEDURE — G8420 CALC BMI NORM PARAMETERS: HCPCS | Performed by: INTERNAL MEDICINE

## 2022-02-02 RX ORDER — ONDANSETRON 4 MG/1
4 TABLET, ORALLY DISINTEGRATING ORAL EVERY 8 HOURS PRN
Qty: 90 TABLET | Refills: 1 | Status: SHIPPED | OUTPATIENT
Start: 2022-02-02 | End: 2022-03-04

## 2022-02-02 RX ORDER — ERYTHROMYCIN 250 MG/1
250 TABLET, COATED ORAL 3 TIMES DAILY
Qty: 90 TABLET | Refills: 0 | Status: SHIPPED | OUTPATIENT
Start: 2022-02-02 | End: 2022-03-04

## 2022-02-02 ASSESSMENT — ENCOUNTER SYMPTOMS
NAUSEA: 1
BACK PAIN: 0
DIARRHEA: 1
ABDOMINAL PAIN: 1
CONSTIPATION: 0
ALLERGIC/IMMUNOLOGIC NEGATIVE: 1
ABDOMINAL DISTENTION: 1
VOMITING: 1
EYES NEGATIVE: 1
BLOOD IN STOOL: 0
RESPIRATORY NEGATIVE: 1

## 2022-02-02 NOTE — PROGRESS NOTES
Review of Systems   Constitutional: Positive for appetite change and unexpected weight change. HENT: Negative. Eyes: Negative. Respiratory: Negative. Cardiovascular: Negative. Gastrointestinal: Positive for abdominal distention, abdominal pain, diarrhea, nausea and vomiting. Negative for blood in stool and constipation. Endocrine: Negative. Genitourinary: Negative. Musculoskeletal: Positive for myalgias. Negative for back pain and gait problem. Skin: Negative. Allergic/Immunologic: Negative. Neurological: Positive for dizziness, weakness, light-headedness and headaches. Hematological: Negative. Psychiatric/Behavioral: Negative. All other systems reviewed and are negative.
evaluated by a Virtual Visit (telephone visit) encounter to address concerns as mentioned above. A caregiver was present when appropriate. Due to this being a TeleHealth encounter (During VEYCI-56 public health emergency), evaluation of the following organ systems was limited: Vitals/Constitutional/EENT/Resp/CV/GI//MS/Neuro/Skin/Heme-Lymph-Imm. Pursuant to the emergency declaration under the [address] [place] [address] and the [employer] and [employer] Act, this Virtual Visit was conducted with patient's (and/or legal guardian's) consent, to reduce the patient's risk of exposure to COVID-19 and provide necessary medical care. The patient (and/or legal guardian) has also been advised to contact this office for worsening conditions or problems, and seek emergency medical treatment and/or call 911 if deemed necessary. Services were provided through a telephone synchronous discussion virtually to substitute for in-person clinic visit. Patient and provider were located at their individual homes. --Kristin Kussmaul, MD on 2/2/2022 at 11:33 AM    An electronic signature was used to authenticate this note.        Kristin Kussmaul, MD MD  [facility]

## 2022-02-02 NOTE — TELEPHONE ENCOUNTER
From: Pao Cole  To: Dr. Kyle Adam: 2/1/2022 2:25 PM EST  Subject: Carafate    Hello,    I have a question about Carafate. When I had it filled last week, it was from a different  than previous months. It hasn't felt as effective. ... my stomach is burning all of the time and I've been vomiting up bile more regularly. I was just wondering if the effectiveness could be different from  to .     Thanks,  [place]

## 2022-02-15 DIAGNOSIS — K80.50 BILIARY COLIC SYMPTOM: ICD-10-CM

## 2022-02-15 RX ORDER — SUCRALFATE 1 G/1
1 TABLET ORAL 4 TIMES DAILY
Qty: 120 TABLET | Refills: 3 | Status: SHIPPED | OUTPATIENT
Start: 2022-02-15

## 2022-03-01 ENCOUNTER — TELEPHONE (OUTPATIENT)
Dept: GASTROENTEROLOGY | Age: 32
End: 2022-03-01

## 2022-03-01 ENCOUNTER — TELEMEDICINE (OUTPATIENT)
Dept: GASTROENTEROLOGY | Age: 32
End: 2022-03-01
Payer: COMMERCIAL

## 2022-03-01 DIAGNOSIS — K82.8 BILIARY DYSKINESIA: Primary | ICD-10-CM

## 2022-03-01 DIAGNOSIS — K31.84 GASTROPARESIS: ICD-10-CM

## 2022-03-01 PROCEDURE — 1036F TOBACCO NON-USER: CPT | Performed by: INTERNAL MEDICINE

## 2022-03-01 PROCEDURE — G8484 FLU IMMUNIZE NO ADMIN: HCPCS | Performed by: INTERNAL MEDICINE

## 2022-03-01 PROCEDURE — G8420 CALC BMI NORM PARAMETERS: HCPCS | Performed by: INTERNAL MEDICINE

## 2022-03-01 PROCEDURE — 99213 OFFICE O/P EST LOW 20 MIN: CPT | Performed by: INTERNAL MEDICINE

## 2022-03-01 PROCEDURE — G8427 DOCREV CUR MEDS BY ELIG CLIN: HCPCS | Performed by: INTERNAL MEDICINE

## 2022-03-01 ASSESSMENT — ENCOUNTER SYMPTOMS
RESPIRATORY NEGATIVE: 1
VOMITING: 1
ALLERGIC/IMMUNOLOGIC NEGATIVE: 1
BLOOD IN STOOL: 0
ABDOMINAL PAIN: 1
BACK PAIN: 0
NAUSEA: 1
ABDOMINAL DISTENTION: 1
EYES NEGATIVE: 1
CONSTIPATION: 1

## 2022-03-01 NOTE — TELEPHONE ENCOUNTER
Writer unable to check out patient for her VV today with Dr Freya Díaz. Unable to schedule f/u because the didn't answer the phone. Unable to leave message since the phone kept ringing.

## 2022-03-01 NOTE — PROGRESS NOTES
VIRTUAL VISIT:      Jagruti Frias is a 27 y.o. female evaluated via telephone on 3/1/2022. Consent:  She and/or health care decision maker is aware that that she may receive a bill for this telephone service, depending on her insurance coverage, and has provided verbal consent to proceed: Yes    Agree with ROS as documented and detailed by MA/LPN in separate note from today's encounter     Documentation:  I communicated with the patient and/or health care decision maker about her GI issues. Details of this discussion including any medical advice provided: Moderate to severe gastroparesis, refractory symptoms. Emptying test revealed severe gastroparesis, partially tolerated. Clinically the patient appears to have significant symptoms related to gastroparesis   Suboptimal response to Reglan, patient reports feeling tired and fatigued with the use of this medication. This is to be discontinued given the patient's effects related to this medication. Different Carafate manufacture not as effective  We will recommend Carafate manufactured by original manufacture (T JOSE RAFAEL)      Refractory gastroparesis despite conventional use of a prokinetic therapy, failed therapy with Reglan and erythromycin at high doses, modest response to Carafate Zofran and other antiemetics    Patient continues to have episodes of daily emesis, no postprandial dyspepsia and bloating  I will provide urgent referral to be evaluated for P OP, likely need to be evaluated likely the clinic for pyloric myotomy. Discussed procedure with patient. She is in agreement. I affirm this is a Patient Initiated Episode with an Established Patient who has not had a related appointment within my department in the past 7 days or scheduled within the next 24 hours.     Total Time: minutes: 11-20 minutes    Jagruti Frias is a 27 y.o. female being evaluated by a Virtual Visit (telephone visit) encounter to address concerns as mentioned above.  A caregiver was present when appropriate. Due to this being a TeleHealth encounter (During QWLBS-99 public health emergency), evaluation of the following organ systems was limited: Vitals/Constitutional/EENT/Resp/CV/GI//MS/Neuro/Skin/Heme-Lymph-Imm. Pursuant to the emergency declaration under the [address] [place] [address], [address] [place] [address] and the [employer] and [employer] Act, this Virtual Visit was conducted with patient's (and/or legal guardian's) consent, to reduce the patient's risk of exposure to COVID-19 and provide necessary medical care. The patient (and/or legal guardian) has also been advised to contact this office for worsening conditions or problems, and seek emergency medical treatment and/or call 911 if deemed necessary. Services were provided through a telephone synchronous discussion virtually to substitute for in-person clinic visit. Patient and provider were located at their individual homes. --Ino Saavedra MD on 3/1/2022 at 1:07 PM    An electronic signature was used to authenticate this note.        Ino Saavedra MD MD  [facility]

## 2022-03-01 NOTE — PROGRESS NOTES
Review of Systems   Constitutional: Positive for appetite change and unexpected weight change. HENT: Negative. Eyes: Negative. Respiratory: Negative. Cardiovascular: Negative. Gastrointestinal: Positive for abdominal distention, abdominal pain, constipation, nausea and vomiting. Negative for blood in stool. Endocrine: Negative. Genitourinary: Negative. Musculoskeletal: Positive for myalgias. Negative for back pain and gait problem. Skin: Negative. Allergic/Immunologic: Negative. Neurological: Positive for dizziness, weakness, light-headedness and headaches. Hematological: Negative. Psychiatric/Behavioral: Negative. All other systems reviewed and are negative.

## 2022-03-14 ENCOUNTER — OFFICE VISIT (OUTPATIENT)
Dept: NEUROLOGY | Age: 32
End: 2022-03-14
Payer: COMMERCIAL

## 2022-03-14 VITALS
WEIGHT: 107 LBS | BODY MASS INDEX: 18.96 KG/M2 | HEIGHT: 63 IN | HEART RATE: 123 BPM | DIASTOLIC BLOOD PRESSURE: 70 MMHG | SYSTOLIC BLOOD PRESSURE: 106 MMHG

## 2022-03-14 DIAGNOSIS — G43.711 CHRONIC MIGRAINE WITHOUT AURA, WITH INTRACTABLE MIGRAINE, SO STATED, WITH STATUS MIGRAINOSUS: Primary | ICD-10-CM

## 2022-03-14 PROCEDURE — 99214 OFFICE O/P EST MOD 30 MIN: CPT | Performed by: PSYCHIATRY & NEUROLOGY

## 2022-03-14 PROCEDURE — G8427 DOCREV CUR MEDS BY ELIG CLIN: HCPCS | Performed by: PSYCHIATRY & NEUROLOGY

## 2022-03-14 PROCEDURE — 1036F TOBACCO NON-USER: CPT | Performed by: PSYCHIATRY & NEUROLOGY

## 2022-03-14 PROCEDURE — G8484 FLU IMMUNIZE NO ADMIN: HCPCS | Performed by: PSYCHIATRY & NEUROLOGY

## 2022-03-14 PROCEDURE — G8420 CALC BMI NORM PARAMETERS: HCPCS | Performed by: PSYCHIATRY & NEUROLOGY

## 2022-03-14 RX ORDER — ERENUMAB-AOOE 140 MG/ML
INJECTION, SOLUTION SUBCUTANEOUS
COMMUNITY
Start: 2022-02-09

## 2022-03-14 RX ORDER — RIMEGEPANT SULFATE 75 MG/75MG
75 TABLET, ORALLY DISINTEGRATING ORAL DAILY PRN
Qty: 30 TABLET | Refills: 2 | Status: SHIPPED | OUTPATIENT
Start: 2022-03-14 | End: 2022-03-14 | Stop reason: SDUPTHER

## 2022-03-14 RX ORDER — RIMEGEPANT SULFATE 75 MG/75MG
75 TABLET, ORALLY DISINTEGRATING ORAL DAILY PRN
Qty: 30 TABLET | Refills: 2 | Status: SHIPPED | OUTPATIENT
Start: 2022-03-14 | End: 2022-04-13

## 2022-03-14 RX ORDER — UBROGEPANT 50 MG/1
TABLET ORAL
COMMUNITY
Start: 2022-01-11

## 2022-03-14 NOTE — PROGRESS NOTES
two weeks. On other days, the patient occasionally experienced a mild headache that is manageable. Since the patient has been denied Nurtec by her insurance, the patient does not currently take an abortive medication for breakthrough headaches. She admitted that Cymbalta continues to be beneficial for her myalgia. Denies side effects from the medication. The patient reported acute insomnia during this visit. Today, patient presents on 3/14/22 for a follow up. Patient has been compliant with Cymbalta, Aimovig, and Ubrelvy PRN. Patient admits to one migraine per week. She explains that she experiences low-level headaches, but is overall headaches have improved. Patient reports improvement in diarrhea since initiating Aimovig. Of note, patient had received a recent gastroparesis diagnosis, and admits to vomiting after eating/drinking. She mentions that the constant emesis may be contributory to her headaches. Patient also mentions that she occasionally sees her medications in her vomit, and is unsure if she is receiving the correct dose. I recommend the patient initiate Nurtec ODT.       Current prophylactic medication Dosage   Cymbalta 60 Mg   Aimovig  70 mg/mL monthly      Previous prophylactic medications Reason for discontinuation   Topomax Causes nausea   amitriptyline Causes brain fog    Botox No significant relief     Previous Abortive medications Reason for discontinuation   Tylenol Did not find relief    Ibuprofen Did not find relief    Imitrex Can not tolerate   Nurtec Denies by insurance             Patient is unable to tolerate triptans    Neurological Workup:  MRI Brain W WO Contrast on 02/20/2021: Normal MRI brain    REVIEW OF SYSTEMS   Constitutional Weight: present, Appetite: present (no appetite), Fatigue: present   HEENT Visual disturbance: present, Ears: pain and ringing   Respiratory Shortness of breath: absent, Cough: absent   Cardiovascular Chest pain: absent, Leg swelling :absent   GI Constipation: absent, Diarrhea: present, Swallowing change: absent    Urinary frequency: absent, Urinary urgency: absent,    Musculoskeletal Neck pain: present, Back pain: present, Stiffness: present, Muscle pain: present, Joint pain: present   Dermatological Hair loss: absent, Skin changes: absent   Neurological Memory loss: present, Confusion: absent, Seizures: absent, Trouble walking or imbalance: present, Dizziness: present, Weakness: present, Numbness: present Tremor: absent Spasm: absent, Speech difficulty: absent, Headache: present, Light sensitivity: present   Psychiatric Anxiety: absent, Hallucination: absent, Depression, absent   Hematologic Abnormal bleeding/Bruising: absent, Anemia: absent       Neurological work up:    MRI Brain W WO Contrast on 02/20/2021: Normal MRI brain     Past Medical History:   Diagnosis Date    Allergic rhinitis     Fibromyalgia     GERD (gastroesophageal reflux disease)     Headache     Neurogenic syncope     Tachycardia      Past Surgical History:   Procedure Laterality Date    CHOLECYSTECTOMY, LAPAROSCOPIC  04/22/2021    CHOLECYSTECTOMY LAPAROSCOPIC ROBOTIC    CHOLECYSTECTOMY, LAPAROSCOPIC N/A 4/22/2021    CHOLECYSTECTOMY LAPAROSCOPIC ROBOTIC performed by Adam Newman DO at [facility]  2013    surgical hip dislocation    UPPER GASTROINTESTINAL ENDOSCOPY  10/12/2021    UPPER GASTROINTESTINAL ENDOSCOPY N/A 10/12/2021    EGD BIOPSY DUODENUM performed by Fermin Cedillo MD at [facility]   Allergen Reactions    Indocin [Indomethacin] Nausea And Vomiting    Gabapentin      fever     Family History   Problem Relation Age of Onset    Asthma Mother     Heart Disease Father     High Blood Pressure Father       Social History     Socioeconomic History    Marital status: Single     Spouse name: Not on file    Number of children: Not on file    Years of education: Not on file    Highest education level: Not on file   Occupational History    Not on file   Tobacco Use    Smoking status: Never Smoker    Smokeless tobacco: Never Used   Vaping Use    Vaping Use: Never used   Substance and Sexual Activity    Alcohol use: Yes     Comment: social    Drug use: No    Sexual activity: Never   Other Topics Concern    Not on file   Social History Narrative    Not on file     Social Determinants of Health     Financial Resource Strain:     Difficulty of Paying Living Expenses: Not on file   Food Insecurity:     Worried About Running Out of Food in the Last Year: Not on file    Paula of Food in the Last Year: Not on file   Transportation Needs:     Lack of Transportation (Medical): Not on file    Lack of Transportation (Non-Medical): Not on file   Physical Activity:     Days of Exercise per Week: Not on file    Minutes of Exercise per Session: Not on file   Stress:     Feeling of Stress : Not on file   Social Connections:     Frequency of Communication with Friends and Family: Not on file    Frequency of Social Gatherings with Friends and Family: Not on file    Attends [employer] Services: Not on file    Active Member of [address] [place] [address] or Organizations: Not on file    Attends Club or Organization Meetings: Not on file    Marital Status: Not on file   Intimate Partner Violence:     Fear of Current or Ex-Partner: Not on file    Emotionally Abused: Not on file    Physically Abused: Not on file    Sexually Abused: Not on file   Housing Stability:     Unable to Pay for Housing in the Last Year: Not on file    Number of [place] in the Last Year: Not on file    Unstable Housing in the Last Year: Not on file      There were no vitals taken for this visit. Physical examination:  General appearance: Normal. Well groomed.  In no acute distress    Head: Normocephalic, atraumatic  Eyes: Extraocular movements intact, eye lids normal  Lungs: Respirations unlabored, chest wall no deformity  ENT: Normal external ear canals, no sinus tenderness  Heart: Regular rate rhythm  Abdomen: No masses, tenderness  Extremities: No cyanosis or edema, 2+ pulses  Musculoskeletal: Normal range of motion in all joints  Skin: Intact, normal skin color    Neurological examination:  Mental status   Alert and oriented; intact memory with no confusion, speech or language problems; no hallucinations or delusions     Cranial nerves   II - visual fields intact to confrontation                                                III, IV, VI - extra-ocular muscles full: no pupillary defect; no TASHA, no nystagmus, no ptosis   V - normal facial sensation                                                               VII - normal facial symmetry                                                             VIII - intact hearing                                                                             IX, X - symmetrical palate                                                                  XI - symmetrical shoulder shrug                                                       XII - midline tongue without atrophy or fasciculation     Motor function  Normal muscle bulk and tone; normal power 5/5, including fine motor movements     Sensory function Intact to touch, pin, vibration, proprioception     Cerebellar Intact fine motor movement. No involuntary movements or tremors     Reflex function Intact 2+ DTR and symmetric. Negative Babinski     Gait                  Normal station and gait       Lab Results   Component Value Date    LDLCALC 126 09/14/2018     No components found for: CHLPL  Lab Results   Component Value Date    TRIG 49 09/14/2018     Lab Results   Component Value Date    HDL 71 (A) 09/14/2018     Lab Results   Component Value Date    LDLCALC 126 09/14/2018     MRI Brain W WO Contrast on 02/20/2021: Normal MR brain     All of patient's labs were personally reviewed. All the imaging studies were personally reviewed and discussed with the patient.      Assessment Recommendations:  Chronic migraine WO Aura, intractable, W Status Migrainosus    Patient has been compliant with Cymbalta 60mg, Aimovig 70mg/mL, and Ubrelvy 50mg PRN. Patient admits to one migraine per week and multiple low-level headaches throughout the week, but is overall headaches have improved with prophylactic medications. I I recommend the patient initiate Nurtec ODT 75mg in place of Ubrelvy. Of note, patient had received a recent gastroparesis diagnosis, and admits to vomiting after eating/drinking. She mentions that the constant emesis may be contributory to her headaches. Patient also mentions that she occasionally sees her medications in her vomit, and is unsure if she is receiving the correct dose. All medication side effects were discussed and questions answered. Patient to follow up in 3-4 months or sooner if symptoms worsen. [address] [facility], [facility] - CNP I would like to thank you for the consult. Please do not hesitate if you have any questions about the patient care. Scribe Attestation:   By signing my name below, Jim Gallegos, attest that this documentation has been prepared under the direction and in the presence of Prosper Hebert MD.    Electronically Signed: Ronald Segovia 6. 03/14/22. 8:57 AM    Physician Attestation:   Jossue Vital MD, personally performed the services described in this documentation. All medical record entries made by the scribe were at my direction and in my presence. I have reviewed the chart and discharge instructions (if applicable) and agree that the record reflects my personal performance and is accurate and complete.     Electronically Signed: Mirian Adams 3/14/2022 8:53 AM    Diplomate, American Board of Psychiatry and Neurology  Diplomate, American Board of Clinical Neurophysiology  Diplomate, American Board of Epilepsy

## 2022-03-14 NOTE — TELEPHONE ENCOUNTER
Debbi Gil called in because the Nurte went to the wrong pharmacy. It should have gone to [facility].

## 2022-03-16 ENCOUNTER — TELEPHONE (OUTPATIENT)
Dept: NEUROLOGY | Age: 32
End: 2022-03-16

## 2022-04-22 ENCOUNTER — TELEPHONE (OUTPATIENT)
Dept: NEUROLOGY | Age: 32
End: 2022-04-22

## 2022-04-22 NOTE — TELEPHONE ENCOUNTER
Prior Authorization started on cover my meds for Aimovig. Stanford Madrigal denied through cover my meds, will wait on denial letter before appealing medication.

## 2022-04-22 NOTE — LETTER
[facility] Neurology Specialist  [address] [place] [id number]  Phone: [phone]  Fax: [phone]    Berry Murray MD        April 25, 2022    [address] [place] [address]      Dear [employer]: Our recent request for the continuation of therapy for Loida's Aimovig has been denied due to \"a combination with another CGRP inhibitor. \" Sanju Alfredo is only on Aimovig 140mg. She has previously tried the Aimovig 70mg and then had a dose increase to the 140mg with success in her treatment of this medication. She has tried and failed multiple medications including but not limited to gabapentin (allergy), Botox, Imitrex, Topamax, and Amitriptyline. Please reconsider. If you have any questions or concerns, please don't hesitate to call.     Sincerely,        Berry Murray MD

## 2022-04-25 NOTE — TELEPHONE ENCOUNTER
Appeal created with a letter to insurance company. Gerline Heads called in stating her headaches continue daily without the Aimovig. She stated she had relief when previously on Aimovig. Appeal faxed.

## 2022-05-05 ENCOUNTER — TELEPHONE (OUTPATIENT)
Dept: NEUROLOGY | Age: 32
End: 2022-05-05

## 2022-05-05 NOTE — TELEPHONE ENCOUNTER
Received a form to fill out questions regarding Aimovig PA, this was done and faxed back to [employer].

## 2022-08-16 ENCOUNTER — OFFICE VISIT (OUTPATIENT)
Dept: NEUROLOGY | Age: 32
End: 2022-08-16
Payer: COMMERCIAL

## 2022-08-16 VITALS
SYSTOLIC BLOOD PRESSURE: 115 MMHG | HEIGHT: 63 IN | HEART RATE: 79 BPM | WEIGHT: 102 LBS | BODY MASS INDEX: 18.07 KG/M2 | DIASTOLIC BLOOD PRESSURE: 80 MMHG

## 2022-08-16 DIAGNOSIS — G43.711 CHRONIC MIGRAINE WITHOUT AURA, WITH INTRACTABLE MIGRAINE, SO STATED, WITH STATUS MIGRAINOSUS: Primary | ICD-10-CM

## 2022-08-16 PROCEDURE — 99214 OFFICE O/P EST MOD 30 MIN: CPT | Performed by: PSYCHIATRY & NEUROLOGY

## 2022-08-16 PROCEDURE — G8418 CALC BMI BLW LOW PARAM F/U: HCPCS | Performed by: PSYCHIATRY & NEUROLOGY

## 2022-08-16 PROCEDURE — G8427 DOCREV CUR MEDS BY ELIG CLIN: HCPCS | Performed by: PSYCHIATRY & NEUROLOGY

## 2022-08-16 PROCEDURE — 1036F TOBACCO NON-USER: CPT | Performed by: PSYCHIATRY & NEUROLOGY

## 2022-08-16 RX ORDER — RIMEGEPANT SULFATE 75 MG/75MG
TABLET, ORALLY DISINTEGRATING ORAL
COMMUNITY
Start: 2022-03-14

## 2022-08-16 RX ORDER — PANTOPRAZOLE SODIUM 40 MG/1
40 TABLET, DELAYED RELEASE ORAL 2 TIMES DAILY
COMMUNITY
Start: 2022-08-09 | End: 2022-09-08

## 2022-08-16 NOTE — PROGRESS NOTES
[facility]  [place], [address], [address] [facility]  Ph: [phone] or [phone]  FAX: [phone]    Chief Complaint: headaches     Dear Jeffery Adkins, RODRIGUE Arana CNP     I had the pleasure of seeing your patient today in neurology consultation for her symptoms. As you would recall Treva Flores is a 34 y.o. female. The history has been obtained from the patient and from the accompanying medical records. The patient was at her baseline until 14years of age when she was diagnosed with migraines. She has 6-7 migraines a week. Her headaches are located mostly on the left side of her head and involve the left eye area. They can also involve the occipital area. They are discribed as sharp stabbing pain and are associated with photophobia, phonophobia and nausea and vomiting. She reports occational tingling and dropping things; these can happen with or without a migraine. She does not have headache free days. Poor sleep, hygiene or processed foods usually cause her migraines. The patient has been on metoprolol. She reports no significant relief in headaches with Botox. She states she has used Nurtec as a rescue medication for her headaches and reports significant improvement with it, she is using it once a week for her symptom. Patient also admits to fatigue and joint pain. Patient reported she had to have her gallbladder removed and developed an allergy to gabapentin after experiencing a prolonged fever (lasting approximately 8 months). She reported experiencing headaches and/or migraines on a daily basis with no relief from the Botox injections. She admitted that Cymbalta continues to be beneficial for her myalgia. The patient is presenting as a follow-up on 8/16/2022. The patient reports that the headaches and migraines have significantly improved. She experiences 1 migraine a week and less than 10 low-level headaches a month.  Patient states that she is a part of a clinical trial for her gastroparesis and is on a liquid diet and attributed her low-level headaches to her low calorie intake. She reports no side effects with Aimovig. She takes Nurtec a few times a month for headache and migraine prophylaxis. Patient reports improvement in diarrhea since initiating Aimovig. Patient reports medication compliance. Of note, patient had received a recent gastroparesis diagnosis, and admits to vomiting after eating/drinking. She mentions that the constant emesis may be contributory to her headaches. Patient also mentions that she occasionally sees her medications in her vomit, and is unsure if she is receiving the correct dose.     Current prophylactic medication Dosage   Cymbalta 60 Mg   Aimovig  70 mg/mL monthly      Previous prophylactic medications Reason for discontinuation   Topomax Causes nausea   amitriptyline Causes brain fog    Botox No significant relief     Previous Abortive medications Reason for discontinuation   Tylenol Did not find relief    Ibuprofen Did not find relief    Imitrex Can not tolerate   Nurtec Denies by insurance             Patient is unable to tolerate triptans    Neurological Workup:  MRI Brain W WO Contrast on 02/20/2021: Normal MRI brain    REVIEW OF SYSTEMS   Constitutional Weight: present, Appetite: present (no appetite), Fatigue: present   HEENT Visual disturbance: present, Ears: pain and ringing   Respiratory Shortness of breath: absent, Cough: absent   Cardiovascular Chest pain: absent, Leg swelling :absent   GI Constipation: absent, Diarrhea: present, Swallowing change: absent    Urinary frequency: absent, Urinary urgency: absent,    Musculoskeletal Neck pain: present, Back pain: present, Stiffness: present, Muscle pain: present, Joint pain: present   Dermatological Hair loss: absent, Skin changes: absent   Neurological Memory loss: present, Confusion: absent, Seizures: absent, Trouble walking or imbalance: present, Dizziness: present, Weakness: present, Numbness: present Tremor: absent Spasm: absent, Speech difficulty: absent, Headache: present, Light sensitivity: present   Psychiatric Anxiety: absent, Hallucination: absent, Depression, absent   Hematologic Abnormal bleeding/Bruising: absent, Anemia: absent       Neurological work up:    MRI Brain W WO Contrast on 02/20/2021: Normal MRI brain     Past Medical History:   Diagnosis Date    Allergic rhinitis     Fibromyalgia     GERD (gastroesophageal reflux disease)     Headache     Neurogenic syncope     Tachycardia      Past Surgical History:   Procedure Laterality Date    CHOLECYSTECTOMY, LAPAROSCOPIC  04/22/2021    CHOLECYSTECTOMY LAPAROSCOPIC ROBOTIC    CHOLECYSTECTOMY, LAPAROSCOPIC N/A 4/22/2021    CHOLECYSTECTOMY LAPAROSCOPIC ROBOTIC performed by Salma Acosta DO at [facility]  2013    surgical hip dislocation    UPPER GASTROINTESTINAL ENDOSCOPY  10/12/2021    UPPER GASTROINTESTINAL ENDOSCOPY N/A 10/12/2021    EGD BIOPSY DUODENUM performed by Yosef Medina MD at [id number] [place] [address].     Allergies   Allergen Reactions    Indocin [Indomethacin] Nausea And Vomiting    Gabapentin      fever     Family History   Problem Relation Age of Onset    Asthma Mother     Heart Disease Father     High Blood Pressure Father       Social History     Socioeconomic History    Marital status: Single     Spouse name: Not on file    Number of children: Not on file    Years of education: Not on file    Highest education level: Not on file   Occupational History    Not on file   Tobacco Use    Smoking status: Never    Smokeless tobacco: Never   Vaping Use    Vaping Use: Never used   Substance and Sexual Activity    Alcohol use: Yes     Comment: social    Drug use: No    Sexual activity: Never   Other Topics Concern    Not on file   Social History Narrative    Not on file     Social Determinants of Health     Financial Resource Strain: Not on file   Food Insecurity: Not on file Transportation Needs: Not on file   Physical Activity: Not on file   Stress: Not on file   Social Connections: Not on file   Intimate Partner Violence: Not on file   Housing Stability: Not on file      There were no vitals taken for this visit. Physical examination:  General appearance: Normal. Well groomed. In no acute distress    Head: Normocephalic, atraumatic  Eyes: Extraocular movements intact, eye lids normal  Lungs: Respirations unlabored, chest wall no deformity  ENT: Normal external ear canals, no sinus tenderness  Heart: Regular rate rhythm  Abdomen: No masses, tenderness  Extremities: No cyanosis or edema, 2+ pulses  Musculoskeletal: Normal range of motion in all joints  Skin: Intact, normal skin color    Neurological examination:  Mental status   Alert and oriented; intact memory with no confusion, speech or language problems; no hallucinations or delusions     Cranial nerves   II - visual fields intact to confrontation                                                III, IV, VI - extra-ocular muscles full: no pupillary defect; no TASHA, no nystagmus, no ptosis   V - normal facial sensation                                                               VII - normal facial symmetry                                                             VIII - intact hearing                                                                             IX, X - symmetrical palate                                                                  XI - symmetrical shoulder shrug                                                       XII - midline tongue without atrophy or fasciculation     Motor function  Normal muscle bulk and tone; normal power 5/5, including fine motor movements     Sensory function Intact to touch, pin, vibration, proprioception     Cerebellar Intact fine motor movement. No involuntary movements or tremors     Reflex function Intact 2+ DTR and symmetric.  Negative Babinski     Gait                  Normal station and gait       Lab Results   Component Value Date    LDLCALC 126 09/14/2018     No components found for: CHLPL  Lab Results   Component Value Date    TRIG 49 09/14/2018     Lab Results   Component Value Date    HDL 71 (A) 09/14/2018     Lab Results   Component Value Date    LDLCALC 126 09/14/2018     MRI Brain W WO Contrast on 02/20/2021: Normal MR brain     All of patient's labs were personally reviewed. All the imaging studies were personally reviewed and discussed with the patient. Assessment Recommendations:  Chronic migraine WO Aura, intractable, W Status Migrainosus    The patient reports having 1 migraine and multiple low level headaches throughout the week. Patient has been compliant with Cymbalta 60mg, Aimovig 70mg/mL, and Nurtec 75mg PRN. She reports her symptoms controlled with Nurtec. However, She still admits to one migraine per week and multiple low-level headaches throughout the week. Patient is satisfied with her headache and migraine prophylaxis. If the patient continues to experience more frequent migraines and headaches, I recommended she start taking Nurtec 75 mg every other day. All medication side effects were discussed and questions answered. Patient to follow up in 6 months or sooner if symptoms worsen. [address] [place] [address], [facility] I would like to thank you for the consult. Please do not hesitate if you have any questions about the patient care. Scribe Attestation:   By signing my name below, Mortimer Calmyra, attest that this documentation has been prepared under the direction and in the presence of Emir Wolf MD.    Electronically Signed: Dada Patel. 08/16/22. 8:42 AM      I, Julio Hill MD, personally performed the services described in this documentation. All medical record entries made by the scribe were at my direction and in my presence.  I have reviewed the chart and discharge instructions (if applicable) and agree that the record reflects my personal performance and is accurate and complete.     Electronically Signed: Justine Lewis 8/30/2022 11:42 AM    Diplomate, American Board of Psychiatry and Neurology  Diplomate, American Board of Clinical Neurophysiology  Diplomate, American Board of Epilepsy

## 2022-08-18 ENCOUNTER — TELEPHONE (OUTPATIENT)
Dept: NEUROLOGY | Age: 32
End: 2022-08-18

## 2022-11-18 NOTE — TELEPHONE ENCOUNTER
Pharmacy requesting refill of:  Erenumab-aooe (Aimovig) 140 MG/ML SOAJ       Medication active on med list:  yes      Date of last Rx: 12/02/2021   with 3   refills   verified on 11/18/2022   verified by THADDEUS GIRON      Date of last appointment 8/16/2022      Next Visit Date:  Visit date not found

## 2022-11-21 ENCOUNTER — NURSE TRIAGE (OUTPATIENT)
Dept: OTHER | Facility: CLINIC | Age: 32
End: 2022-11-21

## 2022-11-21 RX ORDER — ERENUMAB-AOOE 140 MG/ML
140 INJECTION, SOLUTION SUBCUTANEOUS
Qty: 2 ADJUSTABLE DOSE PRE-FILLED PEN SYRINGE | Refills: 3 | Status: SHIPPED | OUTPATIENT
Start: 2022-11-21

## 2022-11-21 NOTE — TELEPHONE ENCOUNTER
Location of patient: [place]    Subjective: Caller states \"I'm trying to figure out what to do. I am having an issue with one of my eyes. I thought this was initially a stye. This feels different and I don't see a stye. And this is more painful. It feels like something is in my L eye. It is getting redder and worse. I tried to put some saline solution in my eye and it burned terrible. \"     Current Symptoms: L eye pain    Onset: 8 hours ago; sudden    Associated Symptoms:  watering    Pain Severity: 5/10; burning; worse with blinking    Temperature: NA     What has been tried: flushing with saline    LMP: NA Pregnant: NA    Recommended disposition:  Go to ED or UCC now. Care advice provided, patient verbalizes understanding; denies any other questions or concerns; instructed to call back for any new or worsening symptoms. Patient/caller agrees to proceed to nearest [facility] or Emergency Department    This triage is a result of a call to [address] [place] [address]. Please do not respond to the triage nurse through this encounter. Any subsequent communication should be directly with the patient.     Reason for Disposition   Foreign body sensation ('feels like something is in there') and irrigation didn't help    Protocols used: Eye Pain and Other Symptoms-ADULT-[place]

## 2023-03-15 ENCOUNTER — OFFICE VISIT (OUTPATIENT)
Dept: NEUROLOGY | Age: 33
End: 2023-03-15
Payer: COMMERCIAL

## 2023-03-15 VITALS
BODY MASS INDEX: 18.64 KG/M2 | DIASTOLIC BLOOD PRESSURE: 84 MMHG | SYSTOLIC BLOOD PRESSURE: 123 MMHG | HEIGHT: 63 IN | HEART RATE: 110 BPM | WEIGHT: 105.2 LBS

## 2023-03-15 DIAGNOSIS — G43.711 CHRONIC MIGRAINE WITHOUT AURA, WITH INTRACTABLE MIGRAINE, SO STATED, WITH STATUS MIGRAINOSUS: Primary | ICD-10-CM

## 2023-03-15 PROCEDURE — 99214 OFFICE O/P EST MOD 30 MIN: CPT | Performed by: PSYCHIATRY & NEUROLOGY

## 2023-03-15 RX ORDER — GRANISETRON HYDROCHLORIDE 1 MG/1
TABLET, FILM COATED ORAL
COMMUNITY
Start: 2023-03-14

## 2023-03-15 RX ORDER — FAMOTIDINE 20 MG/1
TABLET, FILM COATED ORAL
COMMUNITY
Start: 2023-02-22

## 2023-03-15 NOTE — PROGRESS NOTES
[facility], [address], [address] [place] [address]  Ph: [phone] or [phone]  FAX: [phone]    Chief Complaint: headaches     Dear RODRIGUE Benjamin CNP     I had the pleasure of seeing your patient today in neurology consultation for her symptoms. As you would recall Christine Yates is a 34 y.o. female. The history has been obtained from the patient and from the accompanying medical records. The patient was at her baseline until 14years of age when she was diagnosed with migraines. She has 6-7 migraines a week. Her headaches are located mostly on the left side of her head and involve the left eye area. They can also involve the occipital area. They are discribed as sharp stabbing pain and are associated with photophobia, phonophobia and nausea and vomiting. She reports occational tingling and dropping things; these can happen with or without a migraine. She does not have headache free days. Poor sleep, hygiene or processed foods usually cause her migraines. The patient has been on metoprolol. She reports no significant relief in headaches with Botox. She states she has used Nurtec as a rescue medication for her headaches and reports significant improvement with it, she is using it once a week for her symptom. Patient also admits to fatigue and joint pain. Patient reported she had to have her gallbladder removed and developed an allergy to gabapentin after experiencing a prolonged fever (lasting approximately 8 months). She reported experiencing headaches and/or migraines on a daily basis with no relief from the Botox injections. She admitted that Cymbalta continues to be beneficial for her myalgia. Today, 3/15/23, the patient presents for a follow up visit. The patient admits compliance to Cymbalta 60 mg as directed, Aimovig 140 mg/mL, and Nurtec 75 mg PRN.  The patient reports that she has not had a Aimovig 140 mg/mL injection in the past 4 weeks due

## 2023-04-05 DIAGNOSIS — G43.711 CHRONIC MIGRAINE WITHOUT AURA, WITH INTRACTABLE MIGRAINE, SO STATED, WITH STATUS MIGRAINOSUS: Primary | ICD-10-CM

## 2023-04-05 RX ORDER — RIMEGEPANT SULFATE 75 MG/75MG
TABLET, ORALLY DISINTEGRATING ORAL
Qty: 10 TABLET | Refills: 2 | Status: SHIPPED | OUTPATIENT
Start: 2023-04-05

## 2023-04-05 RX ORDER — ERENUMAB-AOOE 140 MG/ML
140 INJECTION, SOLUTION SUBCUTANEOUS
Qty: 1 ML | Refills: 3 | Status: SHIPPED | OUTPATIENT
Start: 2023-04-05 | End: 2023-07-04

## 2023-04-05 NOTE — TELEPHONE ENCOUNTER
Pt called in about her medications that were to be refilled at her appt on 3/15/23. She called her pharmacy and they didn't have anything from our office. I looked and no medication was sent in that day. I apologized and asked what medications she was expecting that day. She said her Aimovig and Nurtec. I told her that I will get scripts sent to you to refill. She stated her understanding.

## 2023-04-26 ENCOUNTER — TELEPHONE (OUTPATIENT)
Dept: NEUROLOGY | Age: 33
End: 2023-04-26

## 2023-04-26 NOTE — TELEPHONE ENCOUNTER
Received a fax from pharmacy stating [facility] needed a PA. This needed to be called into her insurance. I called Pharmacy  ([phone]) and spoke with [facility]. She asked if pt has tried and failed Topamax. I informed her that she has. She stated that with that information she can approve both medications indefinitely. She stated that there is no approval number, they just do an override on the medications. The pharmacy should be able to fill the medications in about 30 minutes. I called pt and informed her. She stated her understanding.

## 2023-10-10 DIAGNOSIS — G43.711 CHRONIC MIGRAINE WITHOUT AURA, WITH INTRACTABLE MIGRAINE, SO STATED, WITH STATUS MIGRAINOSUS: ICD-10-CM

## 2023-10-11 RX ORDER — ERENUMAB-AOOE 140 MG/ML
INJECTION, SOLUTION SUBCUTANEOUS
Qty: 1 ML | Refills: 0 | Status: SHIPPED | OUTPATIENT
Start: 2023-10-11

## 2023-10-11 NOTE — TELEPHONE ENCOUNTER
Pharmacy requesting refill of AIMOVIG 140 MG/ML SOAJ      Medication active on med list yes      Date of last Rx: 4/5/2023 with 3 refills          verified by THADDEUS HAMMER      Date of last appointment 03/15/2023    Next Visit Date:  Visit date not found

## 2023-11-14 DIAGNOSIS — G43.711 CHRONIC MIGRAINE WITHOUT AURA, WITH INTRACTABLE MIGRAINE, SO STATED, WITH STATUS MIGRAINOSUS: ICD-10-CM

## 2023-11-14 RX ORDER — ERENUMAB-AOOE 140 MG/ML
INJECTION, SOLUTION SUBCUTANEOUS
Qty: 1 ML | Refills: 5 | Status: SHIPPED | OUTPATIENT
Start: 2023-11-14

## 2023-11-14 NOTE — TELEPHONE ENCOUNTER
Pharmacy requesting refill of Aimovig 140mg/mL.       Medication active on med list yes      Date of last Rx: 10/11/2023 with 0 refills          verified by Joyce Keith LPN      Date of last appointment 3/15/2023    Next Visit Date:  Visit date not found

## 2024-07-01 DIAGNOSIS — G43.711 CHRONIC MIGRAINE WITHOUT AURA, WITH INTRACTABLE MIGRAINE, SO STATED, WITH STATUS MIGRAINOSUS: ICD-10-CM

## 2024-07-02 RX ORDER — RIMEGEPANT SULFATE 75 MG/75MG
TABLET, ORALLY DISINTEGRATING ORAL
Qty: 8 TABLET | Refills: 2 | Status: SHIPPED | OUTPATIENT
Start: 2024-07-02

## 2024-07-02 NOTE — TELEPHONE ENCOUNTER
Pharmacy requesting refill of Rimegepant Sulfate (NURTEC) 75 MG TBDP .      Medication active on med list yes      Date of last Rx: 04/05/2023 with 2 refills          verified by JOSE DE JESUS DAVIS      Date of last appointment 03/15/2023    Next Visit Date:  Visit date not found

## 2025-07-22 ENCOUNTER — OFFICE VISIT (OUTPATIENT)
Dept: NEUROLOGY | Age: 35
End: 2025-07-22
Payer: COMMERCIAL

## 2025-07-22 VITALS
DIASTOLIC BLOOD PRESSURE: 75 MMHG | HEART RATE: 81 BPM | WEIGHT: 106.8 LBS | BODY MASS INDEX: 18.92 KG/M2 | HEIGHT: 63 IN | SYSTOLIC BLOOD PRESSURE: 108 MMHG

## 2025-07-22 DIAGNOSIS — G43.711 CHRONIC MIGRAINE WITHOUT AURA, WITH INTRACTABLE MIGRAINE, SO STATED, WITH STATUS MIGRAINOSUS: Primary | ICD-10-CM

## 2025-07-22 PROCEDURE — 99214 OFFICE O/P EST MOD 30 MIN: CPT | Performed by: PSYCHIATRY & NEUROLOGY

## 2025-07-22 RX ORDER — BACLOFEN 10 MG/1
10 TABLET ORAL NIGHTLY
COMMUNITY

## 2025-07-22 RX ORDER — DULOXETIN HYDROCHLORIDE 30 MG/1
CAPSULE, DELAYED RELEASE ORAL
COMMUNITY
Start: 2025-06-09

## 2025-07-22 RX ORDER — ERGOCALCIFEROL 1.25 MG/1
CAPSULE, LIQUID FILLED ORAL
COMMUNITY
Start: 2025-06-09

## 2025-07-22 RX ORDER — RIMEGEPANT SULFATE 75 MG/75MG
75 TABLET, ORALLY DISINTEGRATING ORAL EVERY OTHER DAY
Qty: 15 TABLET | Refills: 3 | Status: SHIPPED | OUTPATIENT
Start: 2025-07-22 | End: 2025-08-21

## 2025-07-22 NOTE — PROGRESS NOTES
confusion; normal speech/language   Cranial nerves II: Visual fields intact to confrontation                      III, IV, VI: Full extraocular movements; no nystagmus, TASHA, or ptosis                       V: Normal facial sensation                       VII: Symmetric facial movements                       VIII: Hearing intact                       IX, X: Palate elevated symmetrically                       XI: Shoulder shrug symmetric                       XII: Tongue midline without fasciculations or atrophy                        Motor  Normal bulk and tone; strength 5/5 throughout, including fine movements    Sensory  Intact to light touch, pinprick, vibration, and proprioception    Cerebellar  Intact finger-to-nose and fine movements; no tremor or dysmetria    Reflexes  2+ and symmetric throughout; Babinski negative    Gait  Normal gait and station; no imbalance or ataxia   General examination:  Normocephalic, atraumatic head with intact extraocular movements, unlabored respirations, normal ENT findings, a regular heart rate and rhythm, a non-tender abdomen without masses, no cyanosis or edema in the extremities with 2+ pulses, and intact skin of normal color.  Neurological work up:  Date Study Finding   02/20/2021 MRI Brain w/wo contrast Normal MRI brain   Migraine History:  Frequency Associated Symptoms   6-7 times/week Sharp stabbing pain, nausea, vomiting, photophobia, phonophobia   Medication Summary:  Medication Status Dosage/Comments   Cymbalta Current 60 mg daily   Nurtec Current 75 mg every other day (previously rescue)   Aimovig Discontinued 140 mg monthly; discontinued due to insurance issues and injection anxiety   Topamax Discontinued Caused nausea   Amitriptyline Discontinued Caused brain fog   Botox Discontinued No benefit   Ibuprofen, Tylenol Discontinued Ineffective   Imitrex Discontinued Not tolerated   Gabapentin Discontinued Allergic reaction (fever)     Assessment and

## (undated) DEVICE — CANNULA IV 18GA L15IN BLNT FILL LUERLOCK HUB MJCT

## (undated) DEVICE — TOWEL,OR,DSP,ST,NATURAL,DLX,4/PK,20PK/CS: Brand: MEDLINE

## (undated) DEVICE — GOWN,AURORA,NONRNF,XL,30/CS: Brand: MEDLINE

## (undated) DEVICE — ELECTRODE PT RET AD L9FT HI MOIST COND ADH HYDRGEL CORDED

## (undated) DEVICE — CANNULA SEAL

## (undated) DEVICE — CLIP INT L POLYMER LOK LIG HEM O LOK

## (undated) DEVICE — NEEDLE INSUF L120MM DIA2MM DISP FOR PNEUMOPERI ENDOPATH

## (undated) DEVICE — BITEBLOCK 54FR W/ DENT RIM BLOX

## (undated) DEVICE — SOLUTION IV 1000ML 0.9% SOD CHL PH 5 INJ USP VIAFLX PLAS

## (undated) DEVICE — TUBING, SUCTION, 3/16" X 10', STRAIGHT: Brand: MEDLINE

## (undated) DEVICE — ARM DRAPE

## (undated) DEVICE — GLOVE ORANGE PI 7   MSG9070

## (undated) DEVICE — Device: Brand: DEFENDO VALVE AND CONNECTOR KIT

## (undated) DEVICE — ADAPTER,CATHETER/SYRINGE/LUER,STERILE: Brand: MEDLINE

## (undated) DEVICE — BLADELESS OBTURATOR: Brand: WECK VISTA

## (undated) DEVICE — SYRINGE MED 50ML LUERLOCK TIP

## (undated) DEVICE — CHLORAPREP 26ML ORANGE

## (undated) DEVICE — ANCHOR TISSUE RETRIEVAL SYSTEM, BAG SIZE 125 ML, PORT SIZE 8 MM: Brand: ANCHOR TISSUE RETRIEVAL SYSTEM

## (undated) DEVICE — STRAP,POSITIONING,KNEE/BODY,FOAM,4X60": Brand: MEDLINE

## (undated) DEVICE — CONNECTOR TBNG AUX H2O JET DISP FOR OLY 160/180 SER

## (undated) DEVICE — GAUZE,SPONGE,3"X3",4PLY,NS,LF: Brand: MEDLINE

## (undated) DEVICE — TIP COVER ACCESSORY

## (undated) DEVICE — JELLY,LUBE,STERILE,FLIP TOP,TUBE,2-OZ: Brand: MEDLINE

## (undated) DEVICE — BASIN EMSIS 700ML GRAPHITE PLAS KID SHP GRAD

## (undated) DEVICE — POLYP TRAP: Brand: TRAPEASE®

## (undated) DEVICE — 4-PORT MANIFOLD: Brand: NEPTUNE 2

## (undated) DEVICE — BLANKET WRM W29.9XL79.1IN UP BODY FORC AIR MISTRAL-AIR

## (undated) DEVICE — SYRINGE MED 10ML LUERLOCK TIP W/O SFTY DISP

## (undated) DEVICE — PROTECTOR ULN NRV PUR FOAM HK LOOP STRP ANATOMICALLY

## (undated) DEVICE — PLUMEPORT LAPAROSCOPIC SMOKE FILTRATION DEVICE: Brand: PLUMEPORT ACTIV

## (undated) DEVICE — SOLUTION ANTIFOG VIS SYS CLEARIFY LAPSCP

## (undated) DEVICE — ADHESIVE SKIN CLSR 0.7ML TOP DERMBND ADV

## (undated) DEVICE — FORCEPS BX L240CM JAW DIA2.8MM L CAP W/ NDL MIC MESH TOOTH

## (undated) DEVICE — PENCIL ES L3M BTTN SWCH HOLSTER W/ BLDE ELECTRD EDGE

## (undated) DEVICE — 40580 - THE PINK PAD - ADVANCED TRENDELENBURG POSITIONING KIT: Brand: 40580 - THE PINK PAD - ADVANCED TRENDELENBURG POSITIONING KIT

## (undated) DEVICE — MHPB BASIC LAP PACK: Brand: MEDLINE INDUSTRIES, INC.

## (undated) DEVICE — SOLUTION IV IRRIG POUR BRL 0.9% SODIUM CHL 2F7124

## (undated) DEVICE — SUCTION IRRIGATOR: Brand: ENDOWRIST